# Patient Record
Sex: FEMALE | Race: WHITE | NOT HISPANIC OR LATINO | Employment: OTHER | URBAN - METROPOLITAN AREA
[De-identification: names, ages, dates, MRNs, and addresses within clinical notes are randomized per-mention and may not be internally consistent; named-entity substitution may affect disease eponyms.]

---

## 2017-02-10 ENCOUNTER — APPOINTMENT (OUTPATIENT)
Dept: LAB | Facility: CLINIC | Age: 77
End: 2017-02-10
Payer: COMMERCIAL

## 2017-02-10 ENCOUNTER — ALLSCRIPTS OFFICE VISIT (OUTPATIENT)
Dept: OTHER | Facility: OTHER | Age: 77
End: 2017-02-10

## 2017-02-10 DIAGNOSIS — I10 ESSENTIAL (PRIMARY) HYPERTENSION: ICD-10-CM

## 2017-02-10 DIAGNOSIS — E78.5 HYPERLIPIDEMIA: ICD-10-CM

## 2017-02-10 LAB
ANION GAP SERPL CALCULATED.3IONS-SCNC: 8 MMOL/L (ref 4–13)
BUN SERPL-MCNC: 16 MG/DL (ref 5–25)
CALCIUM SERPL-MCNC: 9.2 MG/DL (ref 8.3–10.1)
CHLORIDE SERPL-SCNC: 105 MMOL/L (ref 100–108)
CHOLEST SERPL-MCNC: 179 MG/DL (ref 50–200)
CO2 SERPL-SCNC: 29 MMOL/L (ref 21–32)
CREAT SERPL-MCNC: 0.82 MG/DL (ref 0.6–1.3)
GFR SERPL CREATININE-BSD FRML MDRD: >60 ML/MIN/1.73SQ M
GLUCOSE SERPL-MCNC: 88 MG/DL (ref 65–140)
HDLC SERPL-MCNC: 87 MG/DL (ref 40–60)
LDLC SERPL DIRECT ASSAY-MCNC: 87 MG/DL (ref 0–100)
NONHDLC SERPL-MCNC: 92 MG/DL
POTASSIUM SERPL-SCNC: 3.7 MMOL/L (ref 3.5–5.3)
SODIUM SERPL-SCNC: 142 MMOL/L (ref 136–145)
TSH SERPL DL<=0.05 MIU/L-ACNC: 2.23 UIU/ML (ref 0.36–3.74)

## 2017-02-10 PROCEDURE — 83721 ASSAY OF BLOOD LIPOPROTEIN: CPT

## 2017-02-10 PROCEDURE — 82465 ASSAY BLD/SERUM CHOLESTEROL: CPT

## 2017-02-10 PROCEDURE — 80048 BASIC METABOLIC PNL TOTAL CA: CPT

## 2017-02-10 PROCEDURE — 83718 ASSAY OF LIPOPROTEIN: CPT

## 2017-02-10 PROCEDURE — 84443 ASSAY THYROID STIM HORMONE: CPT

## 2017-02-10 PROCEDURE — 36415 COLL VENOUS BLD VENIPUNCTURE: CPT

## 2017-05-23 ENCOUNTER — ALLSCRIPTS OFFICE VISIT (OUTPATIENT)
Dept: OTHER | Facility: OTHER | Age: 77
End: 2017-05-23

## 2017-05-23 DIAGNOSIS — R92.2 INCONCLUSIVE MAMMOGRAM: ICD-10-CM

## 2017-05-23 DIAGNOSIS — Z78.0 ASYMPTOMATIC MENOPAUSAL STATE: ICD-10-CM

## 2017-05-23 DIAGNOSIS — Z12.31 ENCOUNTER FOR SCREENING MAMMOGRAM FOR MALIGNANT NEOPLASM OF BREAST: ICD-10-CM

## 2017-07-12 ENCOUNTER — HOSPITAL ENCOUNTER (OUTPATIENT)
Dept: MAMMOGRAPHY | Facility: CLINIC | Age: 77
Discharge: HOME | End: 2017-07-12
Payer: COMMERCIAL

## 2017-07-12 DIAGNOSIS — Z78.0 ASYMPTOMATIC MENOPAUSAL STATE: ICD-10-CM

## 2017-07-12 DIAGNOSIS — Z12.31 ENCOUNTER FOR SCREENING MAMMOGRAM FOR MALIGNANT NEOPLASM OF BREAST: ICD-10-CM

## 2017-07-12 DIAGNOSIS — R92.2 INCONCLUSIVE MAMMOGRAM: ICD-10-CM

## 2017-07-12 PROCEDURE — 77080 DXA BONE DENSITY AXIAL: CPT

## 2017-07-12 PROCEDURE — 77063 BREAST TOMOSYNTHESIS BI: CPT

## 2017-07-12 PROCEDURE — G0202 SCR MAMMO BI INCL CAD: HCPCS

## 2017-07-14 ENCOUNTER — GENERIC CONVERSION - ENCOUNTER (OUTPATIENT)
Dept: OTHER | Facility: OTHER | Age: 77
End: 2017-07-14

## 2017-10-04 ENCOUNTER — ALLSCRIPTS OFFICE VISIT (OUTPATIENT)
Dept: OTHER | Facility: OTHER | Age: 77
End: 2017-10-04

## 2017-10-04 DIAGNOSIS — M54.6 PAIN IN THORACIC SPINE: ICD-10-CM

## 2017-10-05 NOTE — PROGRESS NOTES
Plan  Acute left-sided thoracic back pain    · Omeprazole 20 MG Oral Capsule Delayed Release; TAKE 1 CAPSULE DAILY  EVERY MORNING BEFORE BREAKFAST   · *1 -  PHYSICAL Formerly [facility]  *  Status: Active  Requested  for: 47BUQ6718  Care Summary provided  : Yes    Discussion/Summary    Thoracic back pain without any danger signs to suggest significant underlying disease  Aleve twice a day or Advil 3 times a day together with omeprazole 20 mg once a dayl to reduce the risk of GI bleeding  therapy assessment  in a couple of weeks if this is not getting any better  History of Present Illness  HPI: A 2 week history of thoracic level back pain felt in the left side about the level of T10-11 and 12 on by lifting heavy objects at work  not radiateby trunk twisting and motions  Globally the trend is getting better over the past 2 weeks but it still recurs and is worsened by sudden motions  intermittent nonsteroidal anti-inflammatory drug with some relief radiation at all  associated dysuria, discolored urine, abdominal pain, nausea, vomiting, or diarrhea  taking methocarbamol but she had left over from an ER visit for a similar problem about 1 year ago but it caused nausea      Review of Systems    Constitutional: not feeling poorly-and-not feeling tired  Cardiovascular: no chest pain  Respiratory: no cough  Gastrointestinal: no abdominal pain  Genitourinary: no dysuria  Musculoskeletal: myalgias, but-as noted in HPI  Integumentary: no rashes-and-no skin wound  Neurological: no headache  Active Problems  1  Acute left flank pain (789 09,338 19) (R10 9)   2  Asymptomatic postmenopausal state (V49 81) (Z78 0)   3  Bruit (785 9) (R09 89)   4  Candida vaginitis (112 1) (B37 3)   5  Chronic combined systolic and diastolic heart failure (656 33) (I50 42)   6  Dense breasts (793 82) (R92 2)   7  Dizziness, nonspecific (780 4) (R42)   8   Encounter for gynecological examination with abnormal finding (V72 31) (Z01 411)   9  Encounter for screening mammogram for malignant neoplasm of breast (V76 12)   (Z12 31)   10  Essential hypertension (401 9) (I10)   11  Falls (E888 9) (W19 XXXA)   12  Functional urinary incontinence (788 91) (R39 81)   13  Hyperlipidemia (272 4) (E78 5)   14  Hypertensive left ventricular hypertrophy, without heart failure (402 90) (I11 9)   15  Hypothyroidism (244 9) (E03 9)   16  Joint Pain In Both Knees (719 46)   17  Mild concentric left ventricular hypertrophy (LVH) (429 3) (I51 7)   18  Nonrheumatic aortic valve insufficiency (424 1) (I35 1)   19  Seborrheic keratosis (702 19) (L82 1)   20  Syncope, unspecified syncope type (780 2) (R55)   21  Vaginitis, atrophic (627 3) (N95 2)    Past Medical History  Active Problems And Past Medical History Reviewed: The active problems and past medical history were reviewed and updated today  Surgical History  Surgical History Reviewed: The surgical history was reviewed and updated today  Social History   · Drinks coffee   · Living Independently Alone (V60 3)   · Marital History -    · Never A Smoker   · Never Used Drugs   · Wine Consumption (___ Glasses/Day)  The social history was reviewed and updated today  Family History  Family History Reviewed: The family history was reviewed and updated today  Current Meds   1  Atorvastatin Calcium 20 MG Oral Tablet; take 1 tablet by mouth once daily; Therapy: 60Mrs1330 to (Evaluate:26Nov2017)  Requested for: 33BMW2035; Last   Rx:09Nxq8347 Ordered   2  Flaxseed Oil 1000 MG Oral Capsule; take 1 capsule daily; Therapy: 62ACK8143 to Recorded   3  Levothyroxine Sodium 50 MCG Oral Tablet; take 1 tablet by mouth once daily; Therapy: 92Vdb0176 to (Evaluate:06Apr2018)  Requested for: 20Rtb0548; Last   Rx:32Xvd5080; Status: ACTIVE - Transmit to Pharmacy - Awaiting Verification Ordered   4  Lisinopril 10 MG Oral Tablet; TAKE 1 TABLET DAILY;    Therapy: 28RBB4551 to subcutaneous tissue: Abnormal  -Multiple actinic keratoses  However, no lesions to suggest shingles of the affected area  Neurologic   Sensation: No sensory loss  Future Appointments    Date/Time Provider Specialty Site   10/16/2017 01:00 PM YENI Gutierres  Internal Medicine [facility] [address] AND WOMEN'S [facility]   11/20/2017 02:45 PM YENI Celaya   Dermatology [facility] OF [facility]     Signatures   Electronically signed by : YENI Davis ; Oct  4 2017  3:18PM EST                       (Author)

## 2017-10-16 ENCOUNTER — ALLSCRIPTS OFFICE VISIT (OUTPATIENT)
Dept: OTHER | Facility: OTHER | Age: 77
End: 2017-10-16

## 2017-11-20 ENCOUNTER — ALLSCRIPTS OFFICE VISIT (OUTPATIENT)
Dept: OTHER | Facility: OTHER | Age: 77
End: 2017-11-20

## 2017-11-21 NOTE — PROGRESS NOTES
Assessment  1  Seborrheic keratosis (702 19) (L82 1)   2  Screening for skin condition (V82 0) (Z13 89)   3  History of nonmelanoma skin cancer (V10 83) (Z85 828)    Plan     · Follow-up visit in 1 year Evaluation and Treatment  Follow-up  Status: Hold For -Scheduling  Requested for: 89LRH0640   · Use a sun block product with an SPF of 15 or more ; Status:Complete;   Done:20Nov2017    Discussion/Summary  Discussion Summary- [facility] Derm:  Assessment #1: seborrheic keratosis  Care Plan:  patient reassured these are normal growths acquire with age no treatment needed  Assessment #2: history of skin cancer  Care Plan:  no recurrence nothing else atypical noted sunblock recommended followup in one year  Assessment #3: screening for dermatologic disorders  Care Plan:  nothing else noted on complete exam followup in one year  Chief Complaint  Chief Complaint Free Text Note Form: yearly check up      History of Present Illness  HPI: 25-year-old female presents for overall checkup previous history of skin cancer no specific complaints noted      Review of Systems  Complete Female Dermatology On license of [facility] Patient:  Constitutional: Denies constitutional symptoms  Eyes: Denies eye symptoms  ENT:  denies ear symptoms, nasal symptoms, mouth or throat symptoms  Cardiovascular: Denies cardiovascular symptoms  Respiratory: Denies respiratory symptoms  Gastrointestinal: Denies gastrointestinal symptoms  Musculoskeletal: Denies musculoskeletal symptoms  Integumentary: Denies skin, hair and nail symptoms  Neurological: Denies neurologic symptoms  Psychiatric: Denies psychiatric symptoms  Endocrine: Denies endocrine symptoms  Hematologic/Lymphatic: Denies hematologic symptoms  Active Problems    1  Active advance directive (V49 89) (Z78 9)   2  Acute left flank pain (789 09,338 19) (R10 9)   3  Acute left-sided thoracic back pain (724 1) (M54 6)   4  Asymptomatic postmenopausal state (V49 81) (Z78 0)   5  Bruit (785 9) (R09 89)   6  Candida vaginitis (112 1) (B37 3)   7  Chronic combined systolic and diastolic heart failure (584 43) (I50 42)   8  Dense breasts (793 82) (R92 2)   9  Dizziness, nonspecific (780 4) (R42)   10  Encounter for gynecological examination with abnormal finding (V72 31) (Z01 411)   11  Encounter for screening mammogram for malignant neoplasm of breast (V76 12)  (Z12 31)   12  Essential hypertension (401 9) (I10)   13  Falls (E888 9) (W19 XXXA)   14  Flu vaccine need (V04 81) (Z23)   15  Functional urinary incontinence (788 91) (R39 81)   16  Hyperlipidemia (272 4) (E78 5)   17  Hypertensive left ventricular hypertrophy, without heart failure (402 90) (I11 9)   18  Hypothyroidism (244 9) (E03 9)   19  Joint Pain In Both Knees (719 46)   20  Mild concentric left ventricular hypertrophy (LVH) (429 3) (I51 7)   21  Nonrheumatic aortic valve insufficiency (424 1) (I35 1)   22  Screening for genitourinary condition (V81 6) (Z13 89)   23  Seborrheic keratosis (702 19) (L82 1)   24  Syncope, unspecified syncope type (780 2) (R55)   25  Vaginitis, atrophic (627 3) (N95 2)    Past Medical History  1  History of Basal Cell Carcinoma Of The Eyelid (173 11)   2  History of Colonoscopy (Fiberoptic) Screening   3  History of nonmelanoma skin cancer (V10 83) (Z85 828)   4  History of sciatica (V12 49) (Z86 69)   5  History of uterine fibroid (V13 29) (Z86 018)   6  History of viral warts (V12 09) (Z86 19)   7  History of Inflamed seborrheic keratosis (702 11) (L82 0)  Past Medical History Reviewed- Derm:   The past medical history was reviewed  Surgical History  1  History of Mohs Micrographic Surgery Face   2  History of Thyroid Surgery   3  History of Total Abdominal Hysterectomy  Surgical History Reviewed [facility]- Derm:   Surgical History reviewed      Family History  Mother    1  Family history of Congestive Heart Failure   2  Family history of arthritis (V17 7) (Z82 61)   3   Family history of hypertension (V17 49) (Z82 49)   4  Family history of Mother  At Age 80  Father    11  Family history of Father  At Age 80   5  Family history of Sudden / Instantaneous Death  Sister    7  Family history of kidney disease (V18 69) (Z84 1)  Family History Reviewed- Derm:   Family History was reviewed      Social History     · Active advance directive (V49 89) (Z78 9)   · Drinks coffee   · Living Independently Alone (V60 3)   · Marital History -    · Never A Smoker   · Never smoked tobacco (V49 89) (Z78 9)   · Never Used Drugs   · Wine Consumption (___ Glasses/Day)  Social History Reviewed Marton Halsted- Derm: The social history was reviewed      Current Meds   1  Atorvastatin Calcium 20 MG Oral Tablet; take 1 tablet by mouth once daily; Therapy: 11Rol3589 to (Evaluate:2018)  Requested for: 12YYL9580; Last Rx:2017; Status: ACTIVE - Transmit to [facility] Verification Ordered   2  Flaxseed Oil 1000 MG Oral Capsule; take 1 capsule daily; Therapy: 16ZRI8965 to Recorded   3  Levothyroxine Sodium 50 MCG Oral Tablet; take 1 tablet by mouth once daily; Therapy: 10Ekl3546 to (Evaluate:2018)  Requested for: 08Xew1770; Last Rx:04Vbw4588; Status: ACTIVE - Transmit to Pharmacy - Awaiting Verification Ordered   4  Lisinopril 10 MG Oral Tablet; TAKE 1 TABLET DAILY; Therapy: 56FIM9957 to (Richelle Payne)  Requested for: 14ZBW9607; Last Rx:33Pmv0286 Ordered   5  Metoprolol Succinate ER 25 MG Oral Tablet Extended Release 24 Hour; TAKE ONE TABLET BY MOUTH ONCE A DAY; Therapy: 73QOU4485 to (Evaluate:54Xdd7109)  Requested for: 35Jge7054; Last Rx:24Ulg6094 Ordered   6  Multivitamin Gummies Adult CHEW; Therapy: (CZACDKQT:78JKY0881) to Recorded   7  Omeprazole 20 MG Oral Capsule Delayed Release; TAKE 1 CAPSULE DAILY EVERY MORNING BEFORE BREAKFAST;  Therapy: 04LKQ8322 to (Evaluate:06Dop7036)  Requested for: 67FAV5977; Last Rx:2017; Status: ACTIVE - Transmit to [facility] Verification Ordered   8  Aguirre Fiber Good CHEW; Therapy: (Recorded:04Oct2017) to Recorded   9  Premarin 0 625 MG/GM Vaginal Cream; Insert 0 5 g intravaginally twice per week; Therapy: 51NIR8981 to (Woolford Yuriy)  Requested for: 15JYE0429; Last Rx:23May2017; Status: ACTIVE - Transmit to [facility] Verification Ordered   10  Premarin 0 625 MG/GM Vaginal Cream; insert 1/2 applicatorful vaginally every week at  bedtime; Therapy: 33RLC9974 to (Evaluate:16Nov2014)  Requested for: 76GSK4087; Last  CX:04HDP6320 Ordered   11  Terconazole 0 4 % Vaginal Cream; INSERT 1 APPLICATORFUL INTRAVAGINALLY AT  BEDTIME NIGHTLY; Therapy: 47NTK8038 to (Evaluate:06Jun2017)  Requested for: 83VZT1539; Last  Rx:35Haz6614; Status: ACTIVE - Transmit to [facility] Verification Ordered   12  Viactiv [phone] RFSF;  Therapy: (NGCBGZGW:39MIM1738) to Recorded  Medication List Reviewed: The medication list was reviewed and updated today  Allergies    1  Aspirin TABS    Physical Exam   Constitutional  General appearance: Appears healthy and well developed  Lymphatic  No visible disturbance  Musculoskeletal  Digits and nails: No clubbing, cyanosis or edema  Cutaneous and nail exam normal    Skin  Scalp skin texture and hair distribution: Normal skin texture on scalp, normal hair distribution  Head: Normal turgor, no rashes, no lesions  Neck: Normal turgor, no rashes, no lesions  Chest: Normal turgor, no rashes, no lesions  Abdomen: Normal turgor, no rashes, no lesions  Back: Normal turgor, no rashes, no lesions  Right upper extremity: Normal turgor, no rashes, no lesions  Left upper extremity: Normal turgor, no rashes, no lesions  Right lower extremity: Normal turgor, no rashes, no lesions  Left lower extremity: Normal turgor, no rashes, no lesions  Neuro/Psych  Alert and oriented x 3  Displays comfort and cooperation during encounterl   Affect is normal    Finding previous sites of skin cancer well healed without recurrence keratotic papules greasy stuck appearance nothing else atypical noted        Signatures   Electronically signed by : YENI Lorenzo ; Nov 20 2017  2:55PM EST                       (Author)

## 2017-12-18 ENCOUNTER — GENERIC CONVERSION - ENCOUNTER (OUTPATIENT)
Dept: OTHER | Facility: OTHER | Age: 77
End: 2017-12-18

## 2017-12-18 ENCOUNTER — ALLSCRIPTS OFFICE VISIT (OUTPATIENT)
Dept: OTHER | Facility: OTHER | Age: 77
End: 2017-12-18

## 2017-12-18 DIAGNOSIS — R20.8 OTHER DISTURBANCES OF SKIN SENSATION: ICD-10-CM

## 2017-12-26 ENCOUNTER — GENERIC CONVERSION - ENCOUNTER (OUTPATIENT)
Dept: OTHER | Facility: OTHER | Age: 77
End: 2017-12-26

## 2017-12-26 ENCOUNTER — HOSPITAL ENCOUNTER (OUTPATIENT)
Dept: NON INVASIVE DIAGNOSTICS | Facility: CLINIC | Age: 77
Discharge: HOME | End: 2017-12-26
Payer: COMMERCIAL

## 2017-12-26 DIAGNOSIS — R20.8 OTHER DISTURBANCES OF SKIN SENSATION: ICD-10-CM

## 2017-12-26 DIAGNOSIS — M79.662 PAIN OF LEFT CALF: ICD-10-CM

## 2017-12-26 PROCEDURE — 93971 EXTREMITY STUDY: CPT

## 2017-12-27 ENCOUNTER — GENERIC CONVERSION - ENCOUNTER (OUTPATIENT)
Dept: OTHER | Facility: OTHER | Age: 77
End: 2017-12-27

## 2018-01-12 VITALS
SYSTOLIC BLOOD PRESSURE: 156 MMHG | BODY MASS INDEX: 27.24 KG/M2 | DIASTOLIC BLOOD PRESSURE: 86 MMHG | WEIGHT: 163.5 LBS | HEIGHT: 65 IN | HEART RATE: 64 BPM | OXYGEN SATURATION: 97 %

## 2018-01-13 VITALS
WEIGHT: 161.25 LBS | TEMPERATURE: 97.9 F | BODY MASS INDEX: 27.53 KG/M2 | SYSTOLIC BLOOD PRESSURE: 146 MMHG | HEIGHT: 64 IN | HEART RATE: 83 BPM | DIASTOLIC BLOOD PRESSURE: 84 MMHG | OXYGEN SATURATION: 96 %

## 2018-01-13 VITALS
WEIGHT: 161 LBS | HEIGHT: 64 IN | DIASTOLIC BLOOD PRESSURE: 88 MMHG | SYSTOLIC BLOOD PRESSURE: 130 MMHG | BODY MASS INDEX: 27.49 KG/M2

## 2018-01-14 NOTE — RESULT NOTES
Verified Results  * DXA BONE DENSITY SPINE HIP AND PELVIS [id number] 01:38PM Cleopatra Krishnan Order Number: [id number]    - Patient Instructions: To schedule this appointment, please contact Central Scheduling at [phone]  Test Name Result Flag Reference   DXA BONE DENSITY SPINE HIP AND PELVIS (Report)     CENTRAL DXA SCAN     CLINICAL HISTORY:  64year old post-menopausal  female risk factors include hypothyroidism  Osteoporosis screening  TECHNIQUE: Bone densitometry was performed using a Hologic Horizon C bone densitometer  Regions of interest appear properly placed  There are no obvious fractures or other confounding variables which could limit the study  Degenerative changes of the    lumbar spine and hip  This will falsely elevate the bone mineral densities in these regions    Moderate S-shaped scoliosis  COMPARISON: None  RESULTS:    LUMBAR SPINE: L1-L4:   BMD 1 134 gm/cm2   T-score 0 8   Z-score 3 4     LUMBAR SPINE L2-L4 (average) : BMD 1 111 gm/sq-cm        T-score is 0 3         Z-score is 3 0          LEFT TOTAL HIP:   BMD 0 915 gm/cm2   T-score -0 2   Z-score 1 8     LEFT FEMORAL NECK:   BMD 0 837 gm/cm2   T-score -0 1   Z-score 2 1     LEFT FOREARM :   BMD 0 636 gm/sq-cm,   T-score is -0 8   Z-score is 2 2          IMPRESSION:   1  Based on the [facility] classification, this study is normal and the patient is considered at low risk for fracture  2  A daily intake of calcium of at least 1200 mg and vitamin D, 800-1000 IU, as well as weight bearing and muscle strengthening exercise, fall prevention and avoidance of tobacco and excessive alcohol intake as basic preventive measures are recommended  3  Repeat DXA scan on the same equipment in 18-24 months as clinically indicated  The 10 year risk of hip fracture is 1%, with the 10 year risk of major osteoporotic fracture being 9%, as calculated by the [facility] fracture risk assessment tool (FRAX)   The current NOF guidelines recommend treating patients with FRAX 10 year risk score of    >3% for hip fracture and >20% for major osteoporotic fracture        WHO CLASSIFICATION:   Normal (a T-score of -1 0 or higher)   Low bone mineral density (a T-score of less than -1 0 but higher than -2 5)   Osteoporosis (a T-score of -2 5 or less)   Severe osteoporosis (a T-score of -2 5 or less with a fragility fracture)             Workstation performed: [id number]     Signed by:   Yonathan Ferreira DO   7/13/17

## 2018-01-15 VITALS
RESPIRATION RATE: 14 BRPM | DIASTOLIC BLOOD PRESSURE: 84 MMHG | BODY MASS INDEX: 27.53 KG/M2 | SYSTOLIC BLOOD PRESSURE: 138 MMHG | HEART RATE: 64 BPM | WEIGHT: 160.38 LBS

## 2018-01-16 NOTE — PROGRESS NOTES
Plan  Screening for genitourinary condition    · *VB - Urinary Incontinence Screen (Dx Z13 89 Screen for UI); Status:Complete -  Retrospective By Protocol Authorization;   Done: 72RQP1227 12:57PM    Discussion/Summary    Prevention issues are stable  Treatment issues are stable  Follow-up visit may be yearly or as needed  Impression: Subsequent Annual Wellness Visit, with preventive exam as well as age and risk appropriate counseling completed  Cardiovascular screening and counseling: screening is current and Dx - V81 2 Screen for CV Disorder  Diabetes screening and counseling: screening is current and Dx - V77 1 Screen for DM  Colorectal cancer screening and counseling: screening is current and Dx - V76 51 Screen for CRC  Breast cancer screening and counseling: screening is current  Cervical cancer screening and counseling: screening not indicated  Osteoporosis screening and counseling: counseling was given on obtaining adequate amounts of calcium and vitamin D on a daily basis  Abdominal aortic aneurysm screening and counseling: screening not indicated and Dx - V81 2 Screen for CV Disorder  Glaucoma screening and counseling: screening is current  HIV screening and counseling: screening not indicated  Immunizations: influenza vaccine is up to date this year, the lifetime pneumococcal vaccine has been completed, hepatitis B vaccination series is not indicated at this time due to the patient's low risk of dexter the disease and Tdap vaccination up to date  She was referred to dermatology  Patient Discussion: plan discussed with the patient, follow-up visit needed in one year  History of Present Illness  HPI:   Here for a wellness visit  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% but asymptomatic; followed by Cardiology  On Ace inhibition and beta blockade  Hyperlipidemia and hypothyroidism are treated    Recent back pain but no serious issue regarding this infected is better  Stress incontinence still noted  Welcome to [employer] and Wellness Visits: The patient is being seen for the subsequent annual wellness visit  Medicare Screening and Risk Factors   Hospitalizations: she has been previously hospitalizied and she has been hospitalized 2 times  Once per lifetime medicare screening tests: ECG (normal)  Medicare Screening Tests Risk Questions   Abdominal aortic aneurysm risk assessment: none indicated  Osteoporosis risk assessment: over 48years of age  HIV risk assessment: none indicated  Drug and Alcohol Use: The patient has never smoked cigarettes  The patient reports rare alcohol use  She has never used illicit drugs  Diet and Physical Activity: Current diet includes well balanced meals, limited junk food, 2 servings of fruit per day, 1 servings of vegetables per day, 0-1 servings of meat per day, 1 servings of whole grains per day, 0 servings of simple carbohydrates per day, 2 servings of dairy products per day, 2 cups of coffee per day, 2 cups of tea per day, 0 cans of regular soda per day and 0 cans of diet soda per day  She exercises 3 times per week  Exercise: strength training, balance 2 hours per week  Mood Disorder and Cognitive Impairment Screening:   Depression screening score was 0  Cognitive impairment screening: denies difficulty learning/retaining new information, denies difficulty handling complex tasks, denies difficulty with reasoning, denies difficulty with spatial ability and orientation, denies difficulty with language and denies difficulty with behavior  Advance Directives: Advance directives: living will, durable power of  for health care directives and advance directives  end of life decisions were reviewed with the patient and I agree with the patient's decisions     Co-Managers and Medical Equipment/Suppliers: See Patient Care Team      Patient Care Team    Care Team Member Role Specialty Office Number Olivia Allen MD  Dermatology [phone]     Review of Systems    Over the past 2 weeks, how often have you been bothered by the following problems? 1 ) Little interest or pleasure in doing things? Not at all    2 ) Feeling down, depressed or hopeless? Not at all    3 ) Trouble falling asleep or sleeping too much? Not at all    4 ) Feeling tired or having little energy? Not at all    5 ) Poor appetite or overeating? Not at all    6 ) Feeling bad about yourself, or that you are a failure, or have let yourself or your family down? Not at all    7 ) Trouble concentrating on things, such as reading a newspaper or watching television? Not at all    8 ) Moving or speaking so slowly that other people could have noticed, or the opposite, moving or speaking faster than usual? Not at all    9 ) Thoughts that you would be better off dead or of hurting yourself in some way? Not at all  Score 0      Active Problems    1  Active advance directive (V49 89) (Z78 9)   2  Acute left flank pain (789 09,338 19) (R10 9)   3  Acute left-sided thoracic back pain (724 1) (M54 6)   4  Asymptomatic postmenopausal state (V49 81) (Z78 0)   5  Bruit (785 9) (R09 89)   6  Candida vaginitis (112 1) (B37 3)   7  Chronic combined systolic and diastolic heart failure (412 28) (I50 42)   8  Dense breasts (793 82) (R92 2)   9  Dizziness, nonspecific (780 4) (R42)   10  Encounter for gynecological examination with abnormal finding (V72 31) (Z01 411)   11  Encounter for screening mammogram for malignant neoplasm of breast (V76 12)    (Z12 31)   12  Essential hypertension (401 9) (I10)   13  Falls (E888 9) (W19 XXXA)   14  Flu vaccine need (V04 81) (Z23)   15  Functional urinary incontinence (788 91) (R39 81)   16  Hyperlipidemia (272 4) (E78 5)   17  Hypertensive left ventricular hypertrophy, without heart failure (402 90) (I11 9)   18  Hypothyroidism (244 9) (E03 9)   19  Joint Pain In Both Knees (719 46)   20   Mild concentric left ventricular hypertrophy (LVH) (429 3) (I51 7)   21  Nonrheumatic aortic valve insufficiency (424 1) (I35 1)   22  Screening for genitourinary condition (V81 6) (Z13 89)   23  Seborrheic keratosis (702 19) (L82 1)   24  Syncope, unspecified syncope type (780 2) (R55)   25  Vaginitis, atrophic (627 3) (N95 2)    Past Medical History    · History of Basal Cell Carcinoma Of The Eyelid (173 11)   · History of Colonoscopy (Fiberoptic) Screening   · History of nonmelanoma skin cancer (V10 83) (A80 054)   · History of sciatica (V12 49) (Z86 69)   · History of uterine fibroid (V13 29) (Z86 018)   · History of viral warts (V12 09) (Z86 19)   · History of Inflamed seborrheic keratosis (702 11) (L82 0)    Surgical History    · History of Mohs Micrographic Surgery Face   · History of Thyroid Surgery   · History of Total Abdominal Hysterectomy    The surgical history was reviewed and updated today  Family History  Mother    · Family history of Congestive Heart Failure   · Family history of arthritis (V17 7) (Z82 61)   · Family history of hypertension (V17 49) (Z82 49)   · Family history of Mother  At Age 80  Father    · Family history of Father  At Age 80   · Family history of Sudden / Instantaneous Death  Sister    · Family history of kidney disease (V18 69) (Z84 1)    The family history was reviewed and updated today  Social History    · Active advance directive (V49 89) (Z78 9)   · Drinks coffee   · Living Independently Alone (V60 3)   · Marital History -    · Never A Smoker   · Never smoked tobacco (V49 89) (Z78 9)   · Never Used Drugs   · Wine Consumption (___ Glasses/Day)  The social history was reviewed and updated today  Current Meds   1  Atorvastatin Calcium 20 MG Oral Tablet; take 1 tablet by mouth once daily; Therapy: 96Nme6660 to (Evaluate:2018)  Requested for: 20QFV3582; Last   Rx:2017; Status: ACTIVE - [facility] Verification Ordered   2  Flaxseed Oil 1000 MG Oral Capsule; take 1 capsule daily; Therapy: 12CMC6997 to Recorded   3  Levothyroxine Sodium 50 MCG Oral Tablet; take 1 tablet by mouth once daily; Therapy: 68Ucq7945 to (Evaluate:06Apr2018)  Requested for: 23Vgc4657; Last   Rx:11Apr2017; Status: ACTIVE - Transmit to Pharmacy - Awaiting Verification Ordered   4  Lisinopril 10 MG Oral Tablet; TAKE 1 TABLET DAILY; Therapy: 91PBR6069 to (Juljennifernicholas Frame)  Requested for: 47BJX5195; Last   Rx:06Oct2017 Ordered   5  Metoprolol Succinate ER 25 MG Oral Tablet Extended Release 24 Hour; TAKE ONE   TABLET BY MOUTH ONCE A DAY; Therapy: 48MYT6324 to (Evaluate:56Kal4470)  Requested for: 59Ngm0092; Last   Rx:11Apr2017 Ordered   6  Multivitamin Gummies Adult CHEW;   Therapy: (OHDLYADL:46HCM9136) to Recorded   7  Omeprazole 20 MG Oral Capsule Delayed Release; TAKE 1 CAPSULE DAILY EVERY   MORNING BEFORE BREAKFAST; Therapy: 89FQH4976 to (Evaluate:04Zug3129)  Requested for: 18IBK9414; Last   Rx:04Oct2017; Status: ACTIVE - Transmit to Pharmacy - Awaiting Verification Ordered   8  Aguirre Fiber Good CHEW;   Therapy: (Recorded:04Oct2017) to Recorded   9  Premarin 0 625 MG/GM Vaginal Cream; Insert 0 5 g intravaginally twice per week; Therapy: 40QWL3507 to (Lawayne Jan)  Requested for: 52WMW3498; Last   Rx:45Fjw9997; Status: ACTIVE - Transmit to [facility] Verification Ordered   10  Premarin 0 625 MG/GM Vaginal Cream; insert 1/2 applicatorful vaginally every week at    bedtime; Therapy: 93KHD1669 to (Evaluate:16Nov2014)  Requested for: 59HSL0035; Last    TW:85TYQ3216 Ordered   11  Terconazole 0 4 % Vaginal Cream; INSERT 1 APPLICATORFUL INTRAVAGINALLY AT    BEDTIME NIGHTLY; Therapy: 29FBB0078 to (Evaluate:06Jun2017)  Requested for: 86UJX0140; Last    Rx:74Wxa3673; Status: ACTIVE - Transmit to [facility] Verification Ordered   12  Viactiv [phone] [facility];    Therapy: (Recorded:04Oct2017) to Recorded    Allergies    1   Aspirin TABS    Immunizations   1 2 3 4    Influenza  15-Oct-2013 29-Oct-2015 09-Nov-2016 04-Oct-2017    PCV  09-Nov-2016       Pneumo Other  09-Aug-2012       Tdap  19-Oct-2005       Zoster  28-Jan-2013        Vitals  Signs    Heart Rate: 64  Systolic: 315  Diastolic: 86  Height: 5 ft 4 75 in  Weight: 163 lb 8 oz  BMI Calculated: 27 42  BSA Calculated: 1 81  O2 Saturation: 97    Results/Data  *VB - Urinary Incontinence Screen (Dx Z13 89 Screen for UI) 07AKJ6613 12:57PM Loco Duke     Test Name Result Flag Reference   Urinary Incontinence Assessment [id number]         Health Management  Health Maintenance   Fluzone Intramuscular Injectable; every 1 year; Last 29CSB6524; Next Due: 82EBI1844; Overdue  Medicare Annual Wellness Visit; every 1 year; Last 18Ytl9568; Next Due: 63Vyd0633; Overdue  Pneumo; every 5 years; Last 51Jyj7786; Next Due: 28Ihu8478; Overdue    Future Appointments    Date/Time Provider Specialty Site   11/20/2017 02:45 PM YENI Parekh   Dermatology [facility]     Signatures   Electronically signed by : YENI Herring ; Oct 16 2017  1:29PM EST                       (Author)

## 2018-01-23 VITALS
SYSTOLIC BLOOD PRESSURE: 148 MMHG | OXYGEN SATURATION: 94 % | HEART RATE: 66 BPM | HEIGHT: 65 IN | TEMPERATURE: 97.5 F | BODY MASS INDEX: 27.36 KG/M2 | WEIGHT: 164.25 LBS | DIASTOLIC BLOOD PRESSURE: 78 MMHG

## 2018-01-23 NOTE — RESULT NOTES
Verified Results  VAS LOWER LIMB VENOUS DUPLEX STUDY, UNILATERAL/LIMITED 73AQC2281 12:45PM Jan LAL Order Number: [id number]    - Patient Instructions: To schedule this appointment, please contact Central Scheduling at [phone]  Test Name Result Flag Reference   VAS LOWER LIMB VENOUS DUPLEX STUDY, UNILATERAL/LIMITED (Report)     [facility] REPORT   CLINICAL:   Indications: Limb Pain [M79 609]  Patient c/o a burning sensation in the left medial calf that began 6 days ago  Patient reports that pain has revolved at the time of the exam   The patient has no history of DVT or malignancy  FINDINGS:      Left   Impression       CFV   Normal (Patent)             CONCLUSION:   Impression:   RIGHT LOWER LIMB LIMITED: NORMAL   Evaluation shows no evidence of thrombus in the common femoral vein  Doppler evaluation shows a normal response to augmentation maneuvers  LEFT LOWER LIMB: NORMAL   No evidence of acute or chronic deep vein thrombosis   No evidence of superficial thrombophlebitis noted  Doppler evaluation shows a normal response to augmentation maneuvers  Popliteal and peroneal arterial Doppler waveforms are triphasic/biphasic        SIGNATURE:   Electronically Signed by: Nimesh Hua MD, [id number]  on 2017-12-26 03:08:41 PM

## 2018-02-11 DIAGNOSIS — E03.9 HYPOTHYROIDISM, UNSPECIFIED TYPE: Primary | ICD-10-CM

## 2018-02-12 RX ORDER — LEVOTHYROXINE SODIUM 0.05 MG/1
TABLET ORAL
Qty: 90 TABLET | Refills: 2 | Status: SHIPPED | OUTPATIENT
Start: 2018-02-12 | End: 2018-03-14 | Stop reason: SDUPTHER

## 2018-03-14 DIAGNOSIS — E78.5 HYPERLIPIDEMIA, UNSPECIFIED HYPERLIPIDEMIA TYPE: ICD-10-CM

## 2018-03-14 DIAGNOSIS — E03.9 HYPOTHYROIDISM, UNSPECIFIED TYPE: ICD-10-CM

## 2018-03-14 DIAGNOSIS — I10 HYPERTENSION, UNSPECIFIED TYPE: ICD-10-CM

## 2018-03-14 RX ORDER — ATORVASTATIN CALCIUM 20 MG/1
1 TABLET, FILM COATED ORAL DAILY
COMMUNITY
Start: 2012-04-10 | End: 2018-03-14 | Stop reason: SDUPTHER

## 2018-03-14 RX ORDER — ATORVASTATIN CALCIUM 20 MG/1
20 TABLET, FILM COATED ORAL DAILY
Qty: 90 TABLET | Refills: 3 | Status: SHIPPED | OUTPATIENT
Start: 2018-03-14 | End: 2019-02-27 | Stop reason: SDUPTHER

## 2018-03-14 RX ORDER — LISINOPRIL 10 MG/1
10 TABLET ORAL DAILY
Qty: 90 TABLET | Refills: 3 | Status: SHIPPED | OUTPATIENT
Start: 2018-03-14 | End: 2019-02-27 | Stop reason: SDUPTHER

## 2018-03-14 RX ORDER — LISINOPRIL 10 MG/1
1 TABLET ORAL DAILY
COMMUNITY
Start: 2015-12-08 | End: 2018-03-14 | Stop reason: SDUPTHER

## 2018-03-14 RX ORDER — METOPROLOL SUCCINATE 25 MG/1
25 TABLET, EXTENDED RELEASE ORAL DAILY
Qty: 90 TABLET | Refills: 3 | Status: SHIPPED | OUTPATIENT
Start: 2018-03-14 | End: 2019-02-27 | Stop reason: SDUPTHER

## 2018-03-14 RX ORDER — METOPROLOL SUCCINATE 25 MG/1
1 TABLET, EXTENDED RELEASE ORAL DAILY
COMMUNITY
Start: 2016-01-05 | End: 2018-03-14 | Stop reason: SDUPTHER

## 2018-03-14 RX ORDER — LEVOTHYROXINE SODIUM 0.05 MG/1
50 TABLET ORAL DAILY
Qty: 90 TABLET | Refills: 3 | Status: SHIPPED | OUTPATIENT
Start: 2018-03-14 | End: 2019-02-27 | Stop reason: SDUPTHER

## 2018-03-14 NOTE — TELEPHONE ENCOUNTER
Levothyroxine Sodium 50 mcg  QD  Metoprolol Succinate 25 mg QD  Lisinopril 10 mg QD  Atorvastatin Calcium 20 mg QD    90 day supply on all

## 2018-05-17 ENCOUNTER — OFFICE VISIT (OUTPATIENT)
Dept: INTERNAL MEDICINE CLINIC | Facility: CLINIC | Age: 78
End: 2018-05-17
Payer: MEDICARE

## 2018-05-17 VITALS
WEIGHT: 162.6 LBS | DIASTOLIC BLOOD PRESSURE: 92 MMHG | HEART RATE: 91 BPM | OXYGEN SATURATION: 94 % | TEMPERATURE: 98.3 F | HEIGHT: 64 IN | SYSTOLIC BLOOD PRESSURE: 170 MMHG | BODY MASS INDEX: 27.76 KG/M2

## 2018-05-17 DIAGNOSIS — M54.9 OTHER ACUTE BACK PAIN: Primary | ICD-10-CM

## 2018-05-17 PROCEDURE — 99214 OFFICE O/P EST MOD 30 MIN: CPT | Performed by: PHYSICIAN ASSISTANT

## 2018-05-17 RX ORDER — ECHINACEA 400 MG
1 CAPSULE ORAL DAILY
COMMUNITY
Start: 2014-03-19

## 2018-05-17 RX ORDER — ASPIRIN 325 MG
TABLET ORAL
COMMUNITY

## 2018-05-17 RX ORDER — CYCLOBENZAPRINE HCL 10 MG
10 TABLET ORAL 3 TIMES DAILY PRN
Qty: 30 TABLET | Refills: 0 | Status: SHIPPED | OUTPATIENT
Start: 2018-05-17 | End: 2018-11-07 | Stop reason: ALTCHOICE

## 2018-05-17 NOTE — PROGRESS NOTES
Assessment/Plan:    Mid, left back pain-  Likely strain  Use Ibuprofen 600mg TID for 5 days for WITH FOOD  Add muscle relaxer, sedation cautioned  Heat to area 2-3 times a day for 5 days  No problem-specific Assessment & Plan notes found for this encounter  Diagnoses and all orders for this visit:    Other acute back pain  -     cyclobenzaprine (FLEXERIL) 10 mg tablet; Take 1 tablet (10 mg total) by mouth 3 (three) times a day as needed for muscle spasms    Other orders  -     Calcium-Vitamin D-Vitamin K 500-100-40 MG-UNT-MCG CHEW; Chew  -     terconazole (TERAZOL 7) 0 4 % vaginal cream; Insert 1 Applicatorful into the vagina  -     conjugated estrogens (PREMARIN) vaginal cream; Insert into the vagina  -     Multiple Vitamins-Minerals (MULTIVITAMIN GUMMIES ADULT) CHEW; Chew  -     Inulin 2 g CHEW; Chew  -     Flaxseed, Linseed, (FLAXSEED OIL) 1000 MG CAPS; Take 1 capsule by mouth daily          Subjective:      Patient ID: Mahesh Ragsdale is a 76 y o  female  Pt has severe back pain since Monday  She works at [employer] and was doing heavy lifting of boxes and buckets  Pain is in the mid left back  No radiation or numbness or tingling  She had such bad pain she was nauseous and vomited yesterday  She's been taking 1 Advil PM at night and Tylenol 1000mg during the day  Also using a massager  Not much relief  The following portions of the patient's history were reviewed and updated as appropriate: allergies, current medications, past family history, past medical history, past social history, past surgical history and problem list     Review of Systems   Constitutional: Negative for chills and fever  Cardiovascular: Negative for chest pain and palpitations  Gastrointestinal: Negative for abdominal pain, diarrhea and nausea  Musculoskeletal: Positive for back pain and myalgias           Objective:      /92 (BP Location: Left arm, Patient Position: Sitting)   Pulse 91   Temp 98 3 °F (36 8 °C)   Ht 5' 4" (1 626 m)   Wt 73 8 kg (162 lb 9 6 oz)   SpO2 94%   BMI 27 91 kg/m²          Physical Exam   Constitutional: She appears well-developed and well-nourished  HENT:   Head: Normocephalic and atraumatic  Cardiovascular: Normal rate and normal heart sounds  Pulmonary/Chest: Effort normal and breath sounds normal    Abdominal: Soft  Bowel sounds are normal    Musculoskeletal: Normal range of motion  She exhibits no edema or tenderness  Skin: No rash noted

## 2018-06-27 ENCOUNTER — OFFICE VISIT (OUTPATIENT)
Dept: INTERNAL MEDICINE CLINIC | Facility: CLINIC | Age: 78
End: 2018-06-27
Payer: MEDICARE

## 2018-06-27 ENCOUNTER — APPOINTMENT (OUTPATIENT)
Dept: LAB | Facility: CLINIC | Age: 78
End: 2018-06-27
Payer: MEDICARE

## 2018-06-27 VITALS
HEIGHT: 64 IN | OXYGEN SATURATION: 95 % | WEIGHT: 165 LBS | TEMPERATURE: 97.1 F | DIASTOLIC BLOOD PRESSURE: 76 MMHG | SYSTOLIC BLOOD PRESSURE: 128 MMHG | BODY MASS INDEX: 28.17 KG/M2 | HEART RATE: 73 BPM

## 2018-06-27 DIAGNOSIS — R82.998 DARK URINE: ICD-10-CM

## 2018-06-27 DIAGNOSIS — N39.492 POSTURAL URINARY INCONTINENCE: ICD-10-CM

## 2018-06-27 DIAGNOSIS — N39.492 POSTURAL URINARY INCONTINENCE: Primary | ICD-10-CM

## 2018-06-27 LAB
BACTERIA UR QL AUTO: ABNORMAL /HPF
BILIRUB UR QL STRIP: NEGATIVE
CLARITY UR: CLEAR
COLOR UR: YELLOW
GLUCOSE UR STRIP-MCNC: NEGATIVE MG/DL
HGB UR QL STRIP.AUTO: NEGATIVE
HYALINE CASTS #/AREA URNS LPF: ABNORMAL /LPF
KETONES UR STRIP-MCNC: NEGATIVE MG/DL
LEUKOCYTE ESTERASE UR QL STRIP: ABNORMAL
NITRITE UR QL STRIP: NEGATIVE
NON-SQ EPI CELLS URNS QL MICRO: ABNORMAL /HPF
PH UR STRIP.AUTO: 6.5 [PH] (ref 4.5–8)
PROT UR STRIP-MCNC: NEGATIVE MG/DL
RBC #/AREA URNS AUTO: ABNORMAL /HPF
SP GR UR STRIP.AUTO: 1.01 (ref 1–1.03)
UROBILINOGEN UR QL STRIP.AUTO: 0.2 E.U./DL
WBC #/AREA URNS AUTO: ABNORMAL /HPF

## 2018-06-27 PROCEDURE — 99214 OFFICE O/P EST MOD 30 MIN: CPT | Performed by: PHYSICIAN ASSISTANT

## 2018-06-27 PROCEDURE — 87086 URINE CULTURE/COLONY COUNT: CPT

## 2018-06-27 PROCEDURE — 81001 URINALYSIS AUTO W/SCOPE: CPT | Performed by: PHYSICIAN ASSISTANT

## 2018-06-27 NOTE — PROGRESS NOTES
Assessment/Plan:      Enuresis and severe urinary leakage- we will check her urine and culture with sensitivity to rule out UTI  We will refer her to Urogynecology  She would like a local physician who is Dr Addy Rondon    No problem-specific Kit Aponte notes found for this encounter  Diagnoses and all orders for this visit:    Postural urinary incontinence  -     Urinalysis with microscopic  -     Urine culture; Future  -     Ambulatory referral to Urology; Future    Dark urine  -     Urinalysis with microscopic  -     Urine culture; Future  -     Ambulatory referral to Urology; Future          Subjective:      Patient ID: Lenore Alejandra is a 76 y o  female  Patient comes in with complaints of urinary leakage  Patient has had this problem for many years  She says it has been mild and she has only had to wear small mini pads  She said lately has been gotten much worse to the point where it is severe  She states she was on a bus trip and when she stood up she had so much urinary leakage that she swelled herself  She does also admit that her urine has a very dark color and strong odor but she does not have any dysuria or hematuria  She works at [employer] from [phone] in the morning and she says she rarely takes bathroom breaks  She does admit that holding her urine is part of the problem  She rarely drinks much water because of this problem  No abdominal pain, nausea, vomiting or back pain  No fever, chills or sweats  she does say that years ago her gynecologist said that someday she would probably need a bladder suspension  The following portions of the patient's history were reviewed and updated as appropriate: allergies, current medications, past family history, past medical history, past social history, past surgical history and problem list     Review of Systems   Constitutional: Negative for chills, fatigue and fever     Respiratory: Negative for chest tightness and shortness of breath  Cardiovascular: Negative for chest pain and palpitations  Gastrointestinal: Negative for abdominal pain, diarrhea, nausea and vomiting  Genitourinary: Positive for enuresis, frequency and urgency  Negative for difficulty urinating, dysuria and hematuria  Objective:      /76 (BP Location: Left arm, Patient Position: Sitting)   Pulse 73   Temp (!) 97 1 °F (36 2 °C)   Ht 5' 4" (1 626 m)   Wt 74 8 kg (165 lb)   SpO2 95%   BMI 28 32 kg/m²          Physical Exam   Constitutional: She appears well-developed and well-nourished  Cardiovascular: Normal rate and regular rhythm  Pulmonary/Chest: Effort normal and breath sounds normal    Abdominal: Soft  Bowel sounds are normal  There is no tenderness

## 2018-06-29 LAB — BACTERIA UR CULT: NORMAL

## 2018-11-07 ENCOUNTER — OFFICE VISIT (OUTPATIENT)
Dept: INTERNAL MEDICINE CLINIC | Facility: CLINIC | Age: 78
End: 2018-11-07
Payer: MEDICARE

## 2018-11-07 VITALS
HEART RATE: 76 BPM | DIASTOLIC BLOOD PRESSURE: 76 MMHG | BODY MASS INDEX: 28.65 KG/M2 | OXYGEN SATURATION: 97 % | WEIGHT: 167.8 LBS | SYSTOLIC BLOOD PRESSURE: 142 MMHG | HEIGHT: 64 IN

## 2018-11-07 DIAGNOSIS — N39.3 STRESS INCONTINENCE OF URINE: ICD-10-CM

## 2018-11-07 DIAGNOSIS — E03.9 ACQUIRED HYPOTHYROIDISM: ICD-10-CM

## 2018-11-07 DIAGNOSIS — Z23 ENCOUNTER FOR IMMUNIZATION: Primary | ICD-10-CM

## 2018-11-07 DIAGNOSIS — I50.42 CHRONIC COMBINED SYSTOLIC AND DIASTOLIC HEART FAILURE (HCC): Primary | ICD-10-CM

## 2018-11-07 DIAGNOSIS — Z12.39 BREAST SCREENING: ICD-10-CM

## 2018-11-07 DIAGNOSIS — R07.89 CHEST WALL PAIN: ICD-10-CM

## 2018-11-07 DIAGNOSIS — Z12.11 COLON CANCER SCREENING: ICD-10-CM

## 2018-11-07 PROCEDURE — 99214 OFFICE O/P EST MOD 30 MIN: CPT | Performed by: INTERNAL MEDICINE

## 2018-11-07 PROCEDURE — G0008 ADMIN INFLUENZA VIRUS VAC: HCPCS

## 2018-11-07 PROCEDURE — G0439 PPPS, SUBSEQ VISIT: HCPCS | Performed by: INTERNAL MEDICINE

## 2018-11-07 PROCEDURE — 90662 IIV NO PRSV INCREASED AG IM: CPT

## 2018-11-07 RX ORDER — TROSPIUM CHLORIDE ER 60 MG/1
60 CAPSULE ORAL
COMMUNITY
End: 2020-12-23

## 2018-11-07 NOTE — PROGRESS NOTES
Assessment/Plan:       Diagnoses and all orders for this visit:    Chronic combined systolic and diastolic heart failure (HCC)  -     CBC and differential; Future  -     Lipid Panel with Direct LDL reflex; Future  -     Comprehensive metabolic panel; Future  -     TSH, 3rd generation with Free T4 reflex; Future  -     Urinalysis with reflex to microscopic  -     NT-BNP PRO; Future  -     Echo complete with contrast if indicated; Future    Acquired hypothyroidism  -     CBC and differential; Future  -     Lipid Panel with Direct LDL reflex; Future  -     Comprehensive metabolic panel; Future  -     TSH, 3rd generation with Free T4 reflex; Future    Stress incontinence of urine    Chest wall pain  -     XR chest pa & lateral; Future  -     XR ribs 2 vw left; Future    Breast screening    Colon cancer screening  -     Occult Bloood,Fecal Immunochemical; Future    Other orders  -     trospium (SANCTURA XR) 60 mg 24 hr capsule; Take 60 mg by mouth daily before breakfast  -     Cranberry 1000 MG CAPS; Take by mouth          Patient Instructions   Chest wall pain-obtain x-ray of affected area  History of combined systolic and diastolic dysfunction without heart failure-recheck echocardiogram  Hypothyroidism-recheck thyroid profile  Needs screening mammogram  Need FI T for a cancer screen  Routine laboratory testing  Yearly follow          Subjective:      Patient ID: Jairo Paris is a [de-identified] y o  female  An independent [de-identified]year-old patient comes in for yearly examination  She has immediate complaint of pain felt in the left lower chest wall at the junction of the thorax and the abdomen in the left chemical of acute a line noted for about 1 month exacerbated by range of motion but also exacerbated by direct pressure on the affected area  No dyspnea, no cough, no wheeze, no diaphoresis    This is not exertional symptoms  Some mild arthralgias otherwise    Chronic problem of colon  Mixed systolic and diastolic cardiac dysfunction with EF of 40% but currently asymptomatic  Followed by cardiology and placed on low-dose lisinopril plus beta blockade  is treated  Hypothyroidism is supplemented  Lightheadedness- rare intermittent episodes of lightheadedness occurring for at least -15 years  These can occur at rest and walking about  They're very brief lasting anywhere from 30 seconds to 2 minutes  obvious precipitating or relieving factor  Tinnitus-patient has long-standing tinnitus which is chronic and ongoing  severe hearing loss is reported    Urinary stress incontinence-seen by Urogynecology        The following portions of the patient's history were reviewed and updated as appropriate:   She has a past medical history of Basal cell carcinoma; H/O colonoscopy; History of nonmelanoma skin cancer; Inflamed seborrheic keratosis; Sciatica; Uterine fibroid; and Viral warts  ,   does not have any pertinent problems on file  ,   has a past surgical history that includes Mohs surgery (11/08/2012); Thyroid surgery; and Total abdominal hysterectomy  ,  family history includes Arthritis in her mother; Heart failure in her mother; Hypertension in her mother; Kidney disease in her sister  ,   reports that she has never smoked  She has never used smokeless tobacco  She reports that she drinks alcohol  She reports that she does not use drugs  ,  is allergic to aspirin     Current Outpatient Prescriptions   Medication Sig Dispense Refill    atorvastatin (LIPITOR) 20 mg tablet Take 1 tablet (20 mg total) by mouth daily 90 tablet 3    Calcium-Vitamin D-Vitamin K 500-100-40 MG-UNT-MCG CHEW Chew      conjugated estrogens (PREMARIN) vaginal cream Insert into the vagina      Cranberry 1000 MG CAPS Take by mouth      Flaxseed, Linseed, (FLAXSEED OIL) 1000 MG CAPS Take 1 capsule by mouth daily      levothyroxine 50 mcg tablet Take 1 tablet (50 mcg total) by mouth daily 90 tablet 3    lisinopril (ZESTRIL) 10 mg tablet Take 1 tablet (10 mg total) by mouth daily 90 tablet 3    metoprolol succinate (TOPROL-XL) 25 mg 24 hr tablet Take 1 tablet (25 mg total) by mouth daily 90 tablet 3    Multiple Vitamins-Minerals (MULTIVITAMIN GUMMIES ADULT) CHEW Chew      terconazole (TERAZOL 7) 0 4 % vaginal cream Insert 1 Applicatorful into the vagina      trospium (SANCTURA XR) 60 mg 24 hr capsule Take 60 mg by mouth daily before breakfast       No current facility-administered medications for this visit  Review of Systems   Constitutional: Negative for chills and fever  HENT: Positive for tinnitus  Negative for sore throat and trouble swallowing  Eyes: Negative for pain  Respiratory: Negative for cough, shortness of breath and wheezing  Cardiovascular: Positive for chest pain  Negative for leg swelling  A localized pain felt in the left lower chest at the margin of the ribcage and the abdominal cavity exacerbated by pressing directly on the area  There is not an exertional symptoms  Gastrointestinal: Negative for abdominal pain, diarrhea, nausea and vomiting  Endocrine: Negative for cold intolerance and heat intolerance  Genitourinary: Positive for difficulty urinating and frequency  Negative for dysuria and pelvic pain  Musculoskeletal: Positive for arthralgias  Negative for joint swelling  Skin: Negative for rash and wound  Allergic/Immunologic: Negative for immunocompromised state  Neurological: Positive for dizziness  Negative for seizures, syncope and headaches  Psychiatric/Behavioral: Negative for dysphoric mood  The patient is not nervous/anxious  Objective:  Vitals:    11/07/18 1304   BP: 142/76   Pulse: 76   SpO2: 97%      Physical Exam   Constitutional: She is oriented to person, place, and time  She appears well-developed and well-nourished  No distress  An alert patient who appears stated age   HENT:   Head: Normocephalic and atraumatic  Eyes: Pupils are equal, round, and reactive to light   EOM are normal  Neck: Normal range of motion  Neck supple  No tracheal deviation present  No thyromegaly present  Cardiovascular: Normal rate, regular rhythm, S1 normal, S2 normal and normal heart sounds  Exam reveals no gallop  No murmur heard  Pulses:       Dorsalis pedis pulses are 2+ on the right side, and 2+ on the left side  Posterior tibial pulses are 1+ on the right side, and 1+ on the left side  Superficial varicosities of the right lower leg   Pulmonary/Chest: No respiratory distress  She has no wheezes  She has no rales  Abdominal: Soft  Bowel sounds are normal  There is no tenderness  Musculoskeletal: Normal range of motion  She exhibits tenderness  She exhibits no deformity  Tenderness to palpation of the left lower thoracic wall at the junction of the thorax and the abdomen in the hemoclip the [place] line  No visible skin lesion overlying  No palpable mass  Neurological: She is alert and oriented to person, place, and time  Coordination normal    Skin: Skin is warm  Multiple actinic keratoses of various sizes   Psychiatric: She has a normal mood and affect   Judgment normal

## 2018-11-07 NOTE — PROGRESS NOTES
Assessment and Plan:    Problem List Items Addressed This Visit     None        Health Maintenance Due   Topic Date Due    Medicare Annual Wellness Visit (AWV)  1940    SLP PLAN OF CARE  1940    DTaP,Tdap,and Td Vaccines (2 - Td) 10/19/2015    INFLUENZA VACCINE  07/01/2018         HPI:  Natalia Casas is a [de-identified] y o  female here for her Subsequent Wellness Visit  There is no problem list on file for this patient      Past Medical History:   Diagnosis Date    Basal cell carcinoma     H/O colonoscopy     History of nonmelanoma skin cancer     last assessed 11/20/17    Inflamed seborrheic keratosis     Sciatica     Uterine fibroid     Viral warts      Past Surgical History:   Procedure Laterality Date    MOHS SURGERY  11/08/2012    BCC L [place]    THYROID SURGERY      TOTAL ABDOMINAL HYSTERECTOMY       Family History   Problem Relation Age of Onset    Heart failure Mother     Arthritis Mother     Hypertension Mother     Kidney disease Sister      History   Smoking Status    Never Smoker   Smokeless Tobacco    Never Used     History   Alcohol Use    Yes     Comment: Wine      History   Drug Use No       Current Outpatient Prescriptions   Medication Sig Dispense Refill    atorvastatin (LIPITOR) 20 mg tablet Take 1 tablet (20 mg total) by mouth daily 90 tablet 3    Calcium-Vitamin D-Vitamin K 500-100-40 MG-UNT-MCG CHEW Chew      conjugated estrogens (PREMARIN) vaginal cream Insert into the vagina      Cranberry 1000 MG CAPS Take by mouth      Flaxseed, Linseed, (FLAXSEED OIL) 1000 MG CAPS Take 1 capsule by mouth daily      levothyroxine 50 mcg tablet Take 1 tablet (50 mcg total) by mouth daily 90 tablet 3    lisinopril (ZESTRIL) 10 mg tablet Take 1 tablet (10 mg total) by mouth daily 90 tablet 3    metoprolol succinate (TOPROL-XL) 25 mg 24 hr tablet Take 1 tablet (25 mg total) by mouth daily 90 tablet 3    Multiple Vitamins-Minerals (MULTIVITAMIN GUMMIES ADULT) CHEW Chew  terconazole (TERAZOL 7) 0 4 % vaginal cream Insert 1 Applicatorful into the vagina      trospium (SANCTURA XR) 60 mg 24 hr capsule Take 60 mg by mouth daily before breakfast       No current facility-administered medications for this visit  Allergies   Allergen Reactions    Aspirin      Immunization History   Administered Date(s) Administered    Influenza Split High Dose Preservative Free IM 10/29/2015, 11/09/2016, 10/04/2017    Influenza TIV (IM) 10/15/2013    Pneumococcal Conjugate 13-Valent 11/09/2016    Pneumococcal Polysaccharide PPV23 08/09/2012    Tdap 10/19/2005    Zoster 01/28/2013       Patient Care Team:  Juan Miller MD as PCP - General  Jessica Murphy MD    Medicare Screening Tests and Risk Assessments:  Zach Moreno is here for her Subsequent Wellness visit  Last Medicare Wellness visit information reviewed, patient interviewed, no change since last AWV  Health Risk Assessment:  Patient rates overall health as good  Patient feels that their physical health rating is Same  Eyesight was rated as Slightly worse  Hearing was rated as Same  Patient feels that their emotional and mental health rating is Same  Pain experienced by patient in the last 7 days has been Some  Patient's pain rating has been 4/10  Emotional/Mental Health:  Patient has been feeling nervous/anxious  PHQ-9 Depression Screening:    Frequency of the following problems over the past two weeks:      1  Little interest or pleasure in doing things: 0 - not at all      2  Feeling down, depressed, or hopeless: 0 - not at all  PHQ-2 Score: 0          Broken Bones/Falls: Fall Risk Assessment:    In the past year, patient has experienced: No history of falling in past year          Bladder/Bowel:  Patient has leaked urine accidently in the last six months  Patient reports no loss of bowel control  Immunizations:  Patient has not had a flu vaccination within the last year        Home Safety:  Patient has trouble with stairs inside or outside of their home  Patient currently reports that there are no safety hazards present in home, working smoke alarms, working carbon monoxide detectors  Preventative Screenings:   No breast cancer screening performed, no colon cancer screen completed, no cholesterol screen completed, no glaucoma eye exam completed    Nutrition:  Current diet: Regular with servings of the following:    Medications:  Patient is currently taking over-the-counter supplements  List of OTC medications includes: see list   Patient is able to manage medications  Lifestyle Choices:  Patient reports no tobacco use  Patient has not smoked or used tobacco in the past   Patient reports alcohol use  Alcohol use per week: 1 - 2 wkly  Patient drives a vehicle  Patient wears seat belt  Activities of Daily Living:  Can get out of bed by his or her self, able to dress self, able to make own meals, able to do own shopping, able to bathe self, can do own laundry/housekeeping, can manage own money, pay bills and track expenses    Previous Hospitalizations:  No hospitalization or ED visit in past 12 months        Advanced Directives:  Patient has decided on a power of   Patient has spoken to designated power of   Patient has completed advanced directive

## 2018-11-09 ENCOUNTER — APPOINTMENT (OUTPATIENT)
Dept: LAB | Facility: CLINIC | Age: 78
End: 2018-11-09
Payer: MEDICARE

## 2018-11-09 ENCOUNTER — TELEPHONE (OUTPATIENT)
Dept: INTERNAL MEDICINE CLINIC | Facility: CLINIC | Age: 78
End: 2018-11-09

## 2018-11-09 DIAGNOSIS — E03.9 ACQUIRED HYPOTHYROIDISM: ICD-10-CM

## 2018-11-09 DIAGNOSIS — N39.0 URINARY TRACT INFECTION WITHOUT HEMATURIA, SITE UNSPECIFIED: ICD-10-CM

## 2018-11-09 DIAGNOSIS — N39.0 URINARY TRACT INFECTION WITHOUT HEMATURIA, SITE UNSPECIFIED: Primary | ICD-10-CM

## 2018-11-09 DIAGNOSIS — I50.42 CHRONIC COMBINED SYSTOLIC AND DIASTOLIC HEART FAILURE (HCC): ICD-10-CM

## 2018-11-09 LAB
ALBUMIN SERPL BCP-MCNC: 3.5 G/DL (ref 3.5–5)
ALP SERPL-CCNC: 90 U/L (ref 46–116)
ALT SERPL W P-5'-P-CCNC: 24 U/L (ref 12–78)
ANION GAP SERPL CALCULATED.3IONS-SCNC: 4 MMOL/L (ref 4–13)
AST SERPL W P-5'-P-CCNC: 20 U/L (ref 5–45)
BACTERIA UR QL AUTO: ABNORMAL /HPF
BASOPHILS # BLD AUTO: 0.04 THOUSANDS/ΜL (ref 0–0.1)
BASOPHILS NFR BLD AUTO: 1 % (ref 0–1)
BILIRUB SERPL-MCNC: 0.9 MG/DL (ref 0.2–1)
BILIRUB UR QL STRIP: NEGATIVE
BUN SERPL-MCNC: 13 MG/DL (ref 5–25)
CALCIUM SERPL-MCNC: 9.1 MG/DL (ref 8.3–10.1)
CHLORIDE SERPL-SCNC: 102 MMOL/L (ref 100–108)
CHOLEST SERPL-MCNC: 168 MG/DL (ref 50–200)
CLARITY UR: ABNORMAL
CO2 SERPL-SCNC: 30 MMOL/L (ref 21–32)
COLOR UR: YELLOW
CREAT SERPL-MCNC: 0.81 MG/DL (ref 0.6–1.3)
EOSINOPHIL # BLD AUTO: 0.13 THOUSAND/ΜL (ref 0–0.61)
EOSINOPHIL NFR BLD AUTO: 2 % (ref 0–6)
ERYTHROCYTE [DISTWIDTH] IN BLOOD BY AUTOMATED COUNT: 14.6 % (ref 11.6–15.1)
GFR SERPL CREATININE-BSD FRML MDRD: 69 ML/MIN/1.73SQ M
GLUCOSE P FAST SERPL-MCNC: 82 MG/DL (ref 65–99)
GLUCOSE UR STRIP-MCNC: NEGATIVE MG/DL
HCT VFR BLD AUTO: 41.7 % (ref 34.8–46.1)
HDLC SERPL-MCNC: 66 MG/DL (ref 40–60)
HGB BLD-MCNC: 13.4 G/DL (ref 11.5–15.4)
HGB UR QL STRIP.AUTO: ABNORMAL
HYALINE CASTS #/AREA URNS LPF: ABNORMAL /LPF
IMM GRANULOCYTES # BLD AUTO: 0.01 THOUSAND/UL (ref 0–0.2)
IMM GRANULOCYTES NFR BLD AUTO: 0 % (ref 0–2)
KETONES UR STRIP-MCNC: NEGATIVE MG/DL
LDLC SERPL CALC-MCNC: 83 MG/DL (ref 0–100)
LEUKOCYTE ESTERASE UR QL STRIP: ABNORMAL
LYMPHOCYTES # BLD AUTO: 1.6 THOUSANDS/ΜL (ref 0.6–4.47)
LYMPHOCYTES NFR BLD AUTO: 29 % (ref 14–44)
MCH RBC QN AUTO: 32.1 PG (ref 26.8–34.3)
MCHC RBC AUTO-ENTMCNC: 32.1 G/DL (ref 31.4–37.4)
MCV RBC AUTO: 100 FL (ref 82–98)
MONOCYTES # BLD AUTO: 0.61 THOUSAND/ΜL (ref 0.17–1.22)
MONOCYTES NFR BLD AUTO: 11 % (ref 4–12)
NEUTROPHILS # BLD AUTO: 3.06 THOUSANDS/ΜL (ref 1.85–7.62)
NEUTS SEG NFR BLD AUTO: 57 % (ref 43–75)
NITRITE UR QL STRIP: POSITIVE
NON-SQ EPI CELLS URNS QL MICRO: ABNORMAL /HPF
NRBC BLD AUTO-RTO: 0 /100 WBCS
NT-PROBNP SERPL-MCNC: 201 PG/ML
PH UR STRIP.AUTO: 7 [PH] (ref 4.5–8)
PLATELET # BLD AUTO: 262 THOUSANDS/UL (ref 149–390)
PMV BLD AUTO: 10.2 FL (ref 8.9–12.7)
POTASSIUM SERPL-SCNC: 3.9 MMOL/L (ref 3.5–5.3)
PROT SERPL-MCNC: 7.6 G/DL (ref 6.4–8.2)
PROT UR STRIP-MCNC: NEGATIVE MG/DL
RBC # BLD AUTO: 4.17 MILLION/UL (ref 3.81–5.12)
RBC #/AREA URNS AUTO: ABNORMAL /HPF
SODIUM SERPL-SCNC: 136 MMOL/L (ref 136–145)
SP GR UR STRIP.AUTO: 1.01 (ref 1–1.03)
TRIGL SERPL-MCNC: 94 MG/DL
TSH SERPL DL<=0.05 MIU/L-ACNC: 1.6 UIU/ML (ref 0.36–3.74)
UROBILINOGEN UR QL STRIP.AUTO: 0.2 E.U./DL
WBC # BLD AUTO: 5.45 THOUSAND/UL (ref 4.31–10.16)
WBC #/AREA URNS AUTO: ABNORMAL /HPF

## 2018-11-09 PROCEDURE — 84443 ASSAY THYROID STIM HORMONE: CPT

## 2018-11-09 PROCEDURE — 36415 COLL VENOUS BLD VENIPUNCTURE: CPT

## 2018-11-09 PROCEDURE — 85025 COMPLETE CBC W/AUTO DIFF WBC: CPT

## 2018-11-09 PROCEDURE — 81001 URINALYSIS AUTO W/SCOPE: CPT | Performed by: INTERNAL MEDICINE

## 2018-11-09 PROCEDURE — 87186 SC STD MICRODIL/AGAR DIL: CPT

## 2018-11-09 PROCEDURE — 80053 COMPREHEN METABOLIC PANEL: CPT

## 2018-11-09 PROCEDURE — 80061 LIPID PANEL: CPT

## 2018-11-09 PROCEDURE — 87077 CULTURE AEROBIC IDENTIFY: CPT

## 2018-11-09 PROCEDURE — 83880 ASSAY OF NATRIURETIC PEPTIDE: CPT

## 2018-11-09 PROCEDURE — 87086 URINE CULTURE/COLONY COUNT: CPT

## 2018-11-09 NOTE — TELEPHONE ENCOUNTER
----- Message from Scott Chicas PA-C sent at 11/9/2018  2:54 PM EST -----  I ordered a urine culture please call her

## 2018-11-11 LAB — BACTERIA UR CULT: ABNORMAL

## 2018-11-13 ENCOUNTER — APPOINTMENT (OUTPATIENT)
Dept: LAB | Facility: CLINIC | Age: 78
End: 2018-11-13
Payer: MEDICARE

## 2018-11-13 DIAGNOSIS — Z12.11 COLON CANCER SCREENING: ICD-10-CM

## 2018-11-13 LAB — HEMOCCULT STL QL IA: NEGATIVE

## 2018-11-13 PROCEDURE — G0328 FECAL BLOOD SCRN IMMUNOASSAY: HCPCS

## 2018-11-26 ENCOUNTER — OFFICE VISIT (OUTPATIENT)
Dept: DERMATOLOGY | Facility: CLINIC | Age: 78
End: 2018-11-26
Payer: MEDICARE

## 2018-11-26 DIAGNOSIS — Z85.828 HISTORY OF SKIN CANCER: ICD-10-CM

## 2018-11-26 DIAGNOSIS — L82.1 SEBORRHEIC KERATOSIS: Primary | ICD-10-CM

## 2018-11-26 DIAGNOSIS — Z13.89 SCREENING FOR SKIN CONDITION: ICD-10-CM

## 2018-11-26 PROCEDURE — 99213 OFFICE O/P EST LOW 20 MIN: CPT | Performed by: DERMATOLOGY

## 2018-11-26 NOTE — PROGRESS NOTES
[facility] DERMATOLOGY  [address] [place] [address]  [phone]  [phone]     MRN: [id number] : 1940  Encounter: [id number]  Patient Information: Sophia Bird  Chief complaint:  Yearly checkup    History of present illness:  49-year-old female presents for overall skin check previous history of skin cancer no specific concerns noted  Past Medical History:   Diagnosis Date    Basal cell carcinoma     H/O colonoscopy     History of nonmelanoma skin cancer     last assessed 17    Inflamed seborrheic keratosis     Sciatica     Uterine fibroid     Viral warts      Past Surgical History:   Procedure Laterality Date    MOHS SURGERY  2012    BCC L [place]    THYROID SURGERY      TOTAL ABDOMINAL HYSTERECTOMY       Social History   History   Alcohol Use    Yes     Comment: Wine     History   Drug Use No     History   Smoking Status    Never Smoker   Smokeless Tobacco    Never Used     Family History   Problem Relation Age of Onset    Heart failure Mother     Arthritis Mother     Hypertension Mother     Kidney disease Sister      Meds/Allergies   Allergies   Allergen Reactions    Aspirin        Meds:  Prior to Admission medications    Medication Sig Start Date End Date Taking?  Authorizing Provider   atorvastatin (LIPITOR) 20 mg tablet Take 1 tablet (20 mg total) by mouth daily 3/14/18  Yes Angeli Campbell MD   Calcium-Vitamin D-Vitamin K 500-100-40 MG-UNT-MCG CHEW Chew   Yes Historical Provider, MD   conjugated estrogens (PREMARIN) vaginal cream Insert into the vagina 17  Yes Historical Provider, MD   Cranberry 1000 MG CAPS Take by mouth   Yes Historical Provider, MD   Flaxseed, Linseed, (FLAXSEED OIL) 1000 MG CAPS Take 1 capsule by mouth daily 3/19/14  Yes Historical Provider, MD   levothyroxine 50 mcg tablet Take 1 tablet (50 mcg total) by mouth daily 3/14/18  Yes Angeli Campbell MD   lisinopril (ZESTRIL) 10 mg tablet Take 1 tablet (10 mg total) by mouth daily 3/14/18  Yes Keri Lopez MD   metoprolol succinate (TOPROL-XL) 25 mg 24 hr tablet Take 1 tablet (25 mg total) by mouth daily 3/14/18  Yes Keri Lopez MD   Multiple Vitamins-Minerals (MULTIVITAMIN GUMMIES ADULT) [address] [facility] [address] [facility] [address]   Yes Historical Provider, MD   terconazole (TERAZOL 7) 0 4 % vaginal cream Insert 1 Applicatorful into the vagina 5/23/17  Yes Historical Provider, MD   trospium (SANCTURA XR) 60 mg 24 hr capsule Take 60 mg by mouth daily before breakfast   Yes Historical Provider, MD       Subjective:     Review of Systems:    General: negative for - chills, fatigue, fever,  weight gain or weight loss  Psychological: negative for - anxiety, behavioral disorder, concentration difficulties, decreased libido, depression, irritability, memory difficulties, mood swings, sleep disturbances or suicidal ideation  ENT: negative for - hearing difficulties , nasal congestion, nasal discharge, oral lesions, sinus pain, sneezing, sore throat  Allergy and Immunology: negative for - hives, insect bite sensitivity,  Hematological and Lymphatic: negative for - bleeding problems, blood clots,bruising, swollen lymph nodes  Endocrine: negative for - hair pattern changes, hot flashes, malaise/lethargy, mood swings, palpitations, polydipsia/polyuria, skin changes, temperature intolerance or unexpected weight change  Respiratory: negative for - cough, hemoptysis, orthopnea, shortness of breath, or wheezing  Cardiovascular: negative for - chest pain, dyspnea on exertion, edema,  Gastrointestinal: negative for - abdominal pain, nausea/vomiting  Genito-Urinary: negative for - dysuria, incontinence, irregular/heavy menses or urinary frequency/urgency  Musculoskeletal: negative for - gait disturbance, joint pain, joint stiffness, joint swelling, muscle pain, muscular weakness  Dermatological:  As in HPI  Neurological: negative for confusion, dizziness, headaches, impaired coordination/balance, memory loss, numbness/tingling, seizures, speech problems, tremors or weakness       Objective: There were no vitals taken for this visit  Physical Exam:    General Appearance:    Alert, cooperative, no distress   Head:    Normocephalic, without obvious abnormality, atraumatic           Skin:   A full skin exam was performed including scalp, head scalp, eyes, ears, nose, lips, neck, chest, axilla, abdomen, back, buttocks, bilateral upper extremities, bilateral lower extremities, hands, feet, fingers, toes, fingernails, and toenails normal keratotic papules greasy stuck on appearance nothing else atypical noted on complete exam previous sites of skin cancer well healed without recurrence     Assessment:     1  Seborrheic keratosis     2  Screening for skin condition     3  History of skin cancer           Plan:   Seborrheic keratosis patient reassured these are normal growths we acquire with age no treatment needed  History of skin cancer in no recurrence nothing else atypical sunblock recommended follow-up in 1 year  Screening for dermatologic disorders nothing else of concern noted on complete exam follow-up in 1 year      Jose Eduardo Cam MD  11/26/2018,2:47 PM    Portions of the record may have been created with voice recognition software   Occasional wrong word or "sound a like" substitutions may have occurred due to the inherent limitations of voice recognition software   Read the chart carefully and recognize, using context, where substitutions have occurred

## 2018-11-29 ENCOUNTER — HOSPITAL ENCOUNTER (OUTPATIENT)
Dept: NON INVASIVE DIAGNOSTICS | Facility: CLINIC | Age: 78
Discharge: HOME | End: 2018-11-29
Payer: MEDICARE

## 2018-11-29 DIAGNOSIS — I50.42 CHRONIC COMBINED SYSTOLIC AND DIASTOLIC HEART FAILURE (HCC): ICD-10-CM

## 2018-11-29 PROCEDURE — 93306 TTE W/DOPPLER COMPLETE: CPT

## 2018-11-29 PROCEDURE — 93306 TTE W/DOPPLER COMPLETE: CPT | Performed by: INTERNAL MEDICINE

## 2018-12-03 PROBLEM — I35.1 MODERATE AORTIC REGURGITATION: Status: ACTIVE | Noted: 2018-11-30

## 2018-12-04 ENCOUNTER — TELEPHONE (OUTPATIENT)
Dept: INTERNAL MEDICINE CLINIC | Facility: CLINIC | Age: 78
End: 2018-12-04

## 2018-12-04 NOTE — TELEPHONE ENCOUNTER
The lab work which was done in mid November was normal except for the urine culture and she already spoke with Shaista Hendricks about that  Echocardiogram shows concentric ventricular hypertrophy and diastolic dysfunction  What this means is that the heart is straining to work out against high blood pressure but there is no sign of heart failure  The message is to keep the blood pressure low

## 2019-02-07 ENCOUNTER — TELEPHONE (OUTPATIENT)
Dept: INTERNAL MEDICINE CLINIC | Facility: CLINIC | Age: 79
End: 2019-02-07

## 2019-02-07 NOTE — TELEPHONE ENCOUNTER
Pt cld and stated that she spoke to Shaista Hendricks back in December about the [facility] in her urine but doesn't remember what he said to do  Her sister passed away and hasn't had a chance to call  Please call pt and advise [phone]

## 2019-02-20 ENCOUNTER — OFFICE VISIT (OUTPATIENT)
Dept: OBGYN CLINIC | Age: 79
End: 2019-02-20
Payer: MEDICARE

## 2019-02-20 VITALS
DIASTOLIC BLOOD PRESSURE: 100 MMHG | BODY MASS INDEX: 28.41 KG/M2 | WEIGHT: 165.5 LBS | SYSTOLIC BLOOD PRESSURE: 142 MMHG

## 2019-02-20 DIAGNOSIS — N39.3 STRESS INCONTINENCE OF URINE: ICD-10-CM

## 2019-02-20 DIAGNOSIS — N95.2 ATROPHIC VAGINITIS: Primary | ICD-10-CM

## 2019-02-20 PROBLEM — Z13.89 SCREENING FOR SKIN CONDITION: Status: RESOLVED | Noted: 2018-11-26 | Resolved: 2019-02-20

## 2019-02-20 PROBLEM — Z12.39 BREAST SCREENING: Status: RESOLVED | Noted: 2018-11-07 | Resolved: 2019-02-20

## 2019-02-20 PROCEDURE — 87220 TISSUE EXAM FOR FUNGI: CPT | Performed by: NURSE PRACTITIONER

## 2019-02-20 PROCEDURE — 99213 OFFICE O/P EST LOW 20 MIN: CPT | Performed by: NURSE PRACTITIONER

## 2019-02-20 PROCEDURE — 87210 SMEAR WET MOUNT SALINE/INK: CPT | Performed by: NURSE PRACTITIONER

## 2019-02-20 NOTE — PATIENT INSTRUCTIONS
Vaginal Atrophy   WHAT YOU NEED TO KNOW:   Vaginal atrophy is a condition that causes thinning, drying, and inflammation of vaginal tissue  This condition is caused by decreased levels of estrogen (a female sex hormone)  Vaginal atrophy can increase your risk for vaginal and urinary tract infections  Vaginal atrophy can worsen over time if not treated  DISCHARGE INSTRUCTIONS:   Contact your healthcare provider if:   · You have a foul-smelling odor coming from your vagina  · You have a thick, cheese-like discharge from your vagina  · You have itching, swelling, or redness in your vagina  · You have pain or burning when you urinate  · Your urine smells bad  · Your symptoms do not improve, or they get worse  · You have questions or concerns about your condition or care  Medicines:   · Over-the counter vaginal moisturizers  can help reduce dryness  Your healthcare provider may recommend that you use a vaginal moisturizer several times each week and during sex  Only use creams that are made for vaginal use  Do  not  use petroleum jelly  Lubricants can be used during sex to decrease pain and discomfort  · Estrogen  may help decrease dryness  It may also lower your risk of vaginal infections if you are going through menopause  It can also help to relieve urinary symptoms  Estrogen may be prescribed in the form of a cream, tablet, or ring  These medicines can be applied or inserted into the vagina  Estrogen can also be prescribed in the form of a pill  · Take your medicine as directed  Contact your healthcare provider if you think your medicine is not helping or if you have side effects  Tell him of her if you are allergic to any medicine  Keep a list of the medicines, vitamins, and herbs you take  Include the amounts, and when and why you take them  Bring the list or the pill bottles to follow-up visits  Carry your medicine list with you in case of an emergency    Follow up with your healthcare provider as directed:  Write down your questions so you remember to ask them during your visits  © 2017 [employer] Information is for End User's use only and may not be sold, redistributed or otherwise used for commercial purposes  All illustrations and images included in CareNotes® are the copyrighted property of [employer]  or [employer]  The above information is an  only  It is not intended as medical advice for individual conditions or treatments  Talk to your doctor, nurse or pharmacist before following any medical regimen to see if it is safe and effective for you

## 2019-02-20 NOTE — PROGRESS NOTES
Diagnoses and all orders for this visit:    Atrophic vaginitis    Stress incontinence of urine        Call if no symptom improvement, all questions answered, return for annual         Pleasant [de-identified] y o  here for vaginal complaints  She would like refill of her vaginal premarin bc she states she has vaginal odor when she doesn't use it  Denies itching or d/c  Denies urianry issues or dysuria  She denies any treatments tried  Denies recent antibiotic use  Denies douching  Denies fever or pelvic pain  Hysterectomy pt  Not sexually active  On sanct[place] for [facility]-saw UroGyn at [address] for this      Past Medical History:   Diagnosis Date    Basal cell carcinoma     H/O colonoscopy     History of nonmelanoma skin cancer     last assessed 11/20/17    Inflamed seborrheic keratosis     Sciatica     Uterine fibroid     Viral warts      Past Surgical History:   Procedure Laterality Date    MOHS SURGERY  11/08/2012    BCC L [place]    THYROID SURGERY      TOTAL ABDOMINAL HYSTERECTOMY       Social History     Tobacco Use    Smoking status: Never Smoker    Smokeless tobacco: Never Used   Substance Use Topics    Alcohol use: Yes     Comment: Wine    Drug use: No     Family History   Problem Relation Age of Onset    Heart failure Mother     Arthritis Mother     Hypertension Mother     Kidney disease Sister        Current Outpatient Medications:     atorvastatin (LIPITOR) 20 mg tablet, Take 1 tablet (20 mg total) by mouth daily, Disp: 90 tablet, Rfl: 3    Calcium-Vitamin D-Vitamin K 500-100-40 MG-UNT-MCG CHEW, Chew, Disp: , Rfl:     conjugated estrogens (PREMARIN) vaginal cream, Insert into the vagina, Disp: , Rfl:     Cranberry 1000 MG CAPS, Take by mouth, Disp: , Rfl:     Flaxseed, Linseed, (FLAXSEED OIL) 1000 MG CAPS, Take 1 capsule by mouth daily, Disp: , Rfl:     levothyroxine 50 mcg tablet, Take 1 tablet (50 mcg total) by mouth daily, Disp: 90 tablet, Rfl: 3    lisinopril (ZESTRIL) 10 mg tablet, Take 1 tablet (10 mg total) by mouth daily, Disp: 90 tablet, Rfl: 3    metoprolol succinate (TOPROL-XL) 25 mg 24 hr tablet, Take 1 tablet (25 mg total) by mouth daily, Disp: 90 tablet, Rfl: 3    Multiple Vitamins-Minerals (MULTIVITAMIN GUMMIES ADULT) CHEW, Chew, Disp: , Rfl:     terconazole (TERAZOL 7) 0 4 % vaginal cream, Insert 1 Applicatorful into the vagina, Disp: , Rfl:     trospium (SANCTURA XR) 60 mg 24 hr capsule, Take 60 mg by mouth daily before breakfast, Disp: , Rfl:     Allergies   Allergen Reactions    Aspirin      OB History   No data available       Vitals:    02/20/19 0951   BP: 142/100   BP Location: Left arm   Patient Position: Sitting   Cuff Size: Standard   Weight: 75 1 kg (165 lb 8 oz)     Body mass index is 28 41 kg/m²  Review of Systems   Constitutional: Negative for chills, fatigue, fever and unexpected weight change  Respiratory: Negative for shortness of breath  Gastrointestinal: Negative for anal bleeding, blood in stool, constipation and diarrhea  Genitourinary: Negative for difficulty urinating, dysuria and hematuria  Physical Exam   Constitutional: She appears well-developed and well-nourished  No distress  HENT:   Head: Normocephalic  Neck: Normal range of motion  Neck supple  Pulmonary: Effort normal   Abdominal: Soft  Pelvic exam was performed with patient supine  No labial fusion  There is no rash, tenderness, lesion or injury on the right labia  There is no rash, tenderness, lesion or injury on the left labia  Uterus/Cx surgically absent  Right adnexum displays no mass, no tenderness and no fullness  Left adnexum displays no mass, no tenderness and no fullness  No erythema or tenderness in the vagina  No foreign body in the vagina  No signs of injury around the vagina  No vaginal discharge found  Lymphadenopathy:        Right: No inguinal adenopathy present  Left: No inguinal adenopathy present       Wet mount showed +hyphae, ph 4 5, no wbcs or trich, whiff neg

## 2019-02-21 DIAGNOSIS — N95.1 VAGINAL DRYNESS, MENOPAUSAL: Primary | ICD-10-CM

## 2019-02-21 NOTE — TELEPHONE ENCOUNTER
Patient saw Merrily Lefort yesterday in the [place] office and states that [facility] was going to send a refill for her vaginal premarin cream to the [facility] on [address] in [place]  Patient said the refill was never sent to her pharmacy and would like us to send it now if possible

## 2019-02-27 DIAGNOSIS — E03.9 HYPOTHYROIDISM, UNSPECIFIED TYPE: ICD-10-CM

## 2019-02-27 DIAGNOSIS — E78.5 HYPERLIPIDEMIA, UNSPECIFIED HYPERLIPIDEMIA TYPE: ICD-10-CM

## 2019-02-27 DIAGNOSIS — I10 HYPERTENSION, UNSPECIFIED TYPE: ICD-10-CM

## 2019-02-27 RX ORDER — LEVOTHYROXINE SODIUM 0.05 MG/1
50 TABLET ORAL DAILY
Qty: 90 TABLET | Refills: 3 | Status: SHIPPED | OUTPATIENT
Start: 2019-02-27 | End: 2020-03-31

## 2019-02-27 RX ORDER — LISINOPRIL 10 MG/1
TABLET ORAL
Qty: 90 TABLET | Refills: 3 | Status: SHIPPED | OUTPATIENT
Start: 2019-02-27 | End: 2020-02-12

## 2019-02-27 RX ORDER — METOPROLOL SUCCINATE 25 MG/1
25 TABLET, EXTENDED RELEASE ORAL DAILY
Qty: 90 TABLET | Refills: 3 | Status: SHIPPED | OUTPATIENT
Start: 2019-02-27 | End: 2020-02-12

## 2019-02-27 RX ORDER — ATORVASTATIN CALCIUM 20 MG/1
20 TABLET, FILM COATED ORAL DAILY
Qty: 90 TABLET | Refills: 3 | Status: SHIPPED | OUTPATIENT
Start: 2019-02-27 | End: 2020-02-12

## 2019-11-11 ENCOUNTER — APPOINTMENT (OUTPATIENT)
Dept: LAB | Facility: CLINIC | Age: 79
End: 2019-11-11
Payer: MEDICARE

## 2019-11-11 ENCOUNTER — OFFICE VISIT (OUTPATIENT)
Dept: INTERNAL MEDICINE CLINIC | Facility: CLINIC | Age: 79
End: 2019-11-11
Payer: MEDICARE

## 2019-11-11 VITALS
WEIGHT: 169.6 LBS | DIASTOLIC BLOOD PRESSURE: 80 MMHG | RESPIRATION RATE: 18 BRPM | HEART RATE: 68 BPM | BODY MASS INDEX: 26.62 KG/M2 | HEIGHT: 67 IN | TEMPERATURE: 97.7 F | SYSTOLIC BLOOD PRESSURE: 122 MMHG | OXYGEN SATURATION: 96 %

## 2019-11-11 DIAGNOSIS — Z23 ENCOUNTER FOR IMMUNIZATION: ICD-10-CM

## 2019-11-11 DIAGNOSIS — N39.3 STRESS INCONTINENCE OF URINE: ICD-10-CM

## 2019-11-11 DIAGNOSIS — I50.42 CHRONIC COMBINED SYSTOLIC AND DIASTOLIC HEART FAILURE (HCC): ICD-10-CM

## 2019-11-11 DIAGNOSIS — E03.9 ACQUIRED HYPOTHYROIDISM: ICD-10-CM

## 2019-11-11 DIAGNOSIS — Z23 ENCOUNTER FOR IMMUNIZATION: Primary | ICD-10-CM

## 2019-11-11 DIAGNOSIS — R07.89 CHEST WALL PAIN: ICD-10-CM

## 2019-11-11 DIAGNOSIS — I35.1 MODERATE AORTIC REGURGITATION: ICD-10-CM

## 2019-11-11 DIAGNOSIS — R79.9 ABNORMAL FINDING OF BLOOD CHEMISTRY, UNSPECIFIED: ICD-10-CM

## 2019-11-11 LAB
ALBUMIN SERPL BCP-MCNC: 3.6 G/DL (ref 3.5–5)
ALP SERPL-CCNC: 101 U/L (ref 46–116)
ALT SERPL W P-5'-P-CCNC: 24 U/L (ref 12–78)
ANION GAP SERPL CALCULATED.3IONS-SCNC: 5 MMOL/L (ref 4–13)
AST SERPL W P-5'-P-CCNC: 24 U/L (ref 5–45)
BACTERIA UR QL AUTO: ABNORMAL /HPF
BASOPHILS # BLD AUTO: 0.05 THOUSANDS/ΜL (ref 0–0.1)
BASOPHILS NFR BLD AUTO: 1 % (ref 0–1)
BILIRUB SERPL-MCNC: 0.41 MG/DL (ref 0.2–1)
BILIRUB UR QL STRIP: NEGATIVE
BUN SERPL-MCNC: 17 MG/DL (ref 5–25)
CALCIUM SERPL-MCNC: 9.2 MG/DL (ref 8.3–10.1)
CHLORIDE SERPL-SCNC: 108 MMOL/L (ref 100–108)
CHOLEST SERPL-MCNC: 184 MG/DL (ref 50–200)
CLARITY UR: ABNORMAL
CO2 SERPL-SCNC: 27 MMOL/L (ref 21–32)
COLOR UR: YELLOW
CREAT SERPL-MCNC: 0.87 MG/DL (ref 0.6–1.3)
EOSINOPHIL # BLD AUTO: 0.13 THOUSAND/ΜL (ref 0–0.61)
EOSINOPHIL NFR BLD AUTO: 2 % (ref 0–6)
ERYTHROCYTE [DISTWIDTH] IN BLOOD BY AUTOMATED COUNT: 14 % (ref 11.6–15.1)
EST. AVERAGE GLUCOSE BLD GHB EST-MCNC: 117 MG/DL
GFR SERPL CREATININE-BSD FRML MDRD: 63 ML/MIN/1.73SQ M
GLUCOSE SERPL-MCNC: 103 MG/DL (ref 65–140)
GLUCOSE UR STRIP-MCNC: NEGATIVE MG/DL
HBA1C MFR BLD: 5.7 % (ref 4.2–6.3)
HCT VFR BLD AUTO: 42.4 % (ref 34.8–46.1)
HDLC SERPL-MCNC: 64 MG/DL
HGB BLD-MCNC: 13.8 G/DL (ref 11.5–15.4)
HGB UR QL STRIP.AUTO: NEGATIVE
HYALINE CASTS #/AREA URNS LPF: ABNORMAL /LPF
IMM GRANULOCYTES # BLD AUTO: 0.01 THOUSAND/UL (ref 0–0.2)
IMM GRANULOCYTES NFR BLD AUTO: 0 % (ref 0–2)
KETONES UR STRIP-MCNC: NEGATIVE MG/DL
LDLC SERPL CALC-MCNC: 74 MG/DL (ref 0–100)
LEUKOCYTE ESTERASE UR QL STRIP: ABNORMAL
LYMPHOCYTES # BLD AUTO: 2.62 THOUSANDS/ΜL (ref 0.6–4.47)
LYMPHOCYTES NFR BLD AUTO: 40 % (ref 14–44)
MCH RBC QN AUTO: 31.9 PG (ref 26.8–34.3)
MCHC RBC AUTO-ENTMCNC: 32.5 G/DL (ref 31.4–37.4)
MCV RBC AUTO: 98 FL (ref 82–98)
MONOCYTES # BLD AUTO: 0.6 THOUSAND/ΜL (ref 0.17–1.22)
MONOCYTES NFR BLD AUTO: 9 % (ref 4–12)
NEUTROPHILS # BLD AUTO: 3.15 THOUSANDS/ΜL (ref 1.85–7.62)
NEUTS SEG NFR BLD AUTO: 48 % (ref 43–75)
NITRITE UR QL STRIP: NEGATIVE
NON-SQ EPI CELLS URNS QL MICRO: ABNORMAL /HPF
NONHDLC SERPL-MCNC: 120 MG/DL
NRBC BLD AUTO-RTO: 0 /100 WBCS
PH UR STRIP.AUTO: 6 [PH]
PLATELET # BLD AUTO: 234 THOUSANDS/UL (ref 149–390)
PMV BLD AUTO: 10.1 FL (ref 8.9–12.7)
POTASSIUM SERPL-SCNC: 3.8 MMOL/L (ref 3.5–5.3)
PROT SERPL-MCNC: 7.7 G/DL (ref 6.4–8.2)
PROT UR STRIP-MCNC: NEGATIVE MG/DL
RBC # BLD AUTO: 4.32 MILLION/UL (ref 3.81–5.12)
RBC #/AREA URNS AUTO: ABNORMAL /HPF
SODIUM SERPL-SCNC: 140 MMOL/L (ref 136–145)
SP GR UR STRIP.AUTO: 1.01 (ref 1–1.03)
TRIGL SERPL-MCNC: 229 MG/DL
TSH SERPL DL<=0.05 MIU/L-ACNC: 1.56 UIU/ML (ref 0.36–3.74)
UROBILINOGEN UR QL STRIP.AUTO: 0.2 E.U./DL
WBC # BLD AUTO: 6.56 THOUSAND/UL (ref 4.31–10.16)
WBC #/AREA URNS AUTO: ABNORMAL /HPF

## 2019-11-11 PROCEDURE — 80053 COMPREHEN METABOLIC PANEL: CPT

## 2019-11-11 PROCEDURE — 80061 LIPID PANEL: CPT

## 2019-11-11 PROCEDURE — 85025 COMPLETE CBC W/AUTO DIFF WBC: CPT

## 2019-11-11 PROCEDURE — 87077 CULTURE AEROBIC IDENTIFY: CPT | Performed by: PHYSICIAN ASSISTANT

## 2019-11-11 PROCEDURE — G0439 PPPS, SUBSEQ VISIT: HCPCS | Performed by: PHYSICIAN ASSISTANT

## 2019-11-11 PROCEDURE — 83036 HEMOGLOBIN GLYCOSYLATED A1C: CPT

## 2019-11-11 PROCEDURE — 90662 IIV NO PRSV INCREASED AG IM: CPT | Performed by: PHYSICIAN ASSISTANT

## 2019-11-11 PROCEDURE — 36415 COLL VENOUS BLD VENIPUNCTURE: CPT

## 2019-11-11 PROCEDURE — 87086 URINE CULTURE/COLONY COUNT: CPT | Performed by: PHYSICIAN ASSISTANT

## 2019-11-11 PROCEDURE — 87186 SC STD MICRODIL/AGAR DIL: CPT | Performed by: PHYSICIAN ASSISTANT

## 2019-11-11 PROCEDURE — G0008 ADMIN INFLUENZA VIRUS VAC: HCPCS | Performed by: PHYSICIAN ASSISTANT

## 2019-11-11 PROCEDURE — 84443 ASSAY THYROID STIM HORMONE: CPT

## 2019-11-11 PROCEDURE — 81001 URINALYSIS AUTO W/SCOPE: CPT | Performed by: PHYSICIAN ASSISTANT

## 2019-11-11 PROCEDURE — 99214 OFFICE O/P EST MOD 30 MIN: CPT | Performed by: PHYSICIAN ASSISTANT

## 2019-11-11 RX ORDER — OXYBUTYNIN CHLORIDE 10 MG/1
10 TABLET, EXTENDED RELEASE ORAL DAILY
Refills: 2 | COMMUNITY
Start: 2019-09-27 | End: 2022-08-09 | Stop reason: ALTCHOICE

## 2019-11-11 NOTE — PROGRESS NOTES
Assessment and Plan:     Problem List Items Addressed This Visit     None           Preventive health issues were discussed with patient, and age appropriate screening tests were ordered as noted in patient's After Visit Summary  Personalized health advice and appropriate referrals for health education or preventive services given if needed, as noted in patient's After Visit Summary  History of Present Illness:     Patient presents for Medicare Annual Wellness visit    Patient Care Team:  Bishnu Torre MD as PCP - General  Raman Hayes MD     Problem List:     Patient Active Problem List   Diagnosis    Chronic combined systolic and diastolic heart failure (HCC)    Stress incontinence of urine    Chest wall pain    Acquired hypothyroidism    Seborrheic keratosis    History of skin cancer    Moderate aortic regurgitation    Atrophic vaginitis      Past Medical and Surgical History:     Past Medical History:   Diagnosis Date    Basal cell carcinoma     H/O colonoscopy     History of nonmelanoma skin cancer     last assessed 11/20/17    Inflamed seborrheic keratosis     Sciatica     Uterine fibroid     Viral warts      Past Surgical History:   Procedure Laterality Date    MOHS SURGERY  11/08/2012    BCC L [place]    THYROID SURGERY      TOTAL ABDOMINAL HYSTERECTOMY        Family History:     Family History   Problem Relation Age of Onset    Heart failure Mother     Arthritis Mother     Hypertension Mother     Kidney disease Sister       Social History:     Social History     Socioeconomic History    Marital status:       Spouse name: Not on file    Number of children: Not on file    Years of education: Not on file    Highest education level: Not on file   Occupational History    Not on file   Social Needs    Financial resource strain: Not on file    Food insecurity:     Worry: Not on file     Inability: Not on file    Transportation needs:     Medical: Not on file Non-medical: Not on file   Tobacco Use    Smoking status: Never Smoker    Smokeless tobacco: Never Used   Substance and Sexual Activity    Alcohol use: Yes     Comment: Wine    Drug use: No    Sexual activity: Never   Lifestyle    Physical activity:     Days per week: Not on file     Minutes per session: Not on file    Stress: Not on file   Relationships    Social connections:     Talks on phone: Not on file     Gets together: Not on file     Attends Spiritism service: Not on file     Active member of club or organization: Not on file     Attends meetings of clubs or organizations: Not on file     Relationship status: Not on file    Intimate partner violence:     Fear of current or ex partner: Not on file     Emotionally abused: Not on file     Physically abused: Not on file     Forced sexual activity: Not on file   Other Topics Concern    Not on file   Social History Narrative    Active advance directive    Drinks coffee    Living independently alone       Medications and Allergies:     Current Outpatient Medications   Medication Sig Dispense Refill    atorvastatin (LIPITOR) 20 mg tablet TAKE 1 TABLET (20 MG TOTAL) BY MOUTH DAILY 90 tablet 3    Calcium-Vitamin D-Vitamin K 500-100-40 MG-UNT-MCG CHEW Chew      conjugated estrogens (PREMARIN) vaginal cream Insert 0 5 g intravaginally twice weekly 30 g 1    Cranberry 1000 MG CAPS Take by mouth      Flaxseed, Linseed, (FLAXSEED OIL) 1000 MG CAPS Take 1 capsule by mouth daily      levothyroxine 50 mcg tablet TAKE 1 TABLET (50 MCG TOTAL) BY MOUTH DAILY 90 tablet 3    lisinopril (ZESTRIL) 10 mg tablet TAKE 1 TABLET BY MOUTH EVERY DAY 90 tablet 3    metoprolol succinate (TOPROL-XL) 25 mg 24 hr tablet TAKE 1 TABLET (25 MG TOTAL) BY MOUTH DAILY 90 tablet 3    Multiple Vitamins-Minerals (MULTIVITAMIN GUMMIES ADULT) CHEW Chew      terconazole (TERAZOL 7) 0 4 % vaginal cream Insert 1 Applicatorful into the vagina      trospium (SANCTURA XR) 60 mg 24 hr capsule Take 60 mg by mouth daily before breakfast       No current facility-administered medications for this visit  Allergies   Allergen Reactions    Aspirin       Immunizations:     Immunization History   Administered Date(s) Administered    INFLUENZA 09/04/2014, 10/29/2015, 11/09/2016, 10/04/2017, 11/07/2018    Influenza Split High Dose Preservative Free IM 10/29/2015, 11/09/2016, 10/04/2017    Influenza TIV (IM) 10/15/2013    Influenza, high dose seasonal 0 5 mL 11/07/2018    Pneumococcal Conjugate 13-Valent 11/09/2016    Pneumococcal Polysaccharide PPV23 08/09/2012    Tdap 10/19/2005    Zoster 01/28/2013      Health Maintenance: There are no preventive care reminders to display for this patient  Topic Date Due    DTaP,Tdap,and Td Vaccines (2 - Td) 10/19/2015    INFLUENZA VACCINE  07/01/2019      Medicare Health Risk Assessment:     LMP  (LMP Unknown)      Glenis Jha is here for her Subsequent Wellness visit  Health Risk Assessment:   Patient rates overall health as good  Eyesight was rated as same  Hearing was rated as same  Patient feels that their emotional and mental health rating is same  Pain experienced in the last 7 days has been some  Patient's pain rating has been 3/10  Patient states that she has experienced no weight loss or gain in last 6 months  Depression Screening:   PHQ-2 Score: 0      Fall Risk Screening: In the past year, patient has experienced: no history of falling in past year      Urinary Incontinence Screening:   Patient has leaked urine accidently in the last six months  Home Safety:  Patient does not have trouble with stairs inside or outside of their home  Patient has working smoke alarms and has working carbon monoxide detector  Home safety hazards include: none  Nutrition:   Current diet is Regular  Medications:   Patient is currently taking over-the-counter supplements  OTC medications include: vitamin   Patient is able to manage medications  Activities of Daily Living (ADLs)/Instrumental Activities of Daily Living (IADLs):   Walk and transfer into and out of bed and chair?: Yes  Dress and groom yourself?: Yes    Bathe or shower yourself?: Yes    Feed yourself? Yes  Do your laundry/housekeeping?: Yes  Manage your money, pay your bills and track your expenses?: Yes  Make your own meals?: Yes    Do your own shopping?: Yes    Previous Hospitalizations:   Any hospitalizations or ED visits within the last 12 months?: No      Advance Care Planning:   Living will: Yes    Durable POA for healthcare:  Yes    Advanced directive: Yes    Advanced directive counseling given: Yes    Five wishes given: Yes    Patient declined ACP directive: No    End of Life Decisions reviewed with patient: Yes    Provider agrees with end of life decisions: Yes      Cognitive Screening:   Provider or family/friend/caregiver concerned regarding cognition?: No    PREVENTIVE SCREENINGS      Cardiovascular Screening:    General: Screening Not Indicated and History Lipid Disorder      Diabetes Screening:     General: Screening Current      Colorectal Cancer Screening:     General: Screening Current      Breast Cancer Screening:     General: Screening Current      Cervical Cancer Screening:    General: Screening Not Indicated      Osteoporosis Screening:    General: Risks and Benefits Discussed      Abdominal Aortic Aneurysm (AAA) Screening:        General: Risks and Benefits Discussed      Lung Cancer Screening:     General: Risks and Benefits Discussed      Hepatitis C Screening:    General: Risks and Benefits Discussed      Hilda Luo PA-C

## 2019-11-11 NOTE — PROGRESS NOTES
Assessment/Plan: she feels well no complaints physical exam unremarkable getting annual screening lab  Urine culture noted she has seen Urology November 12 to treat this       Diagnoses and all orders for this visit:    Encounter for immunization  -     influenza vaccine, [id number], high-dose, PF 0 5 mL (FLUZONE HIGH-DOSE)    Other orders  -     oxybutynin (DITROPAN-XL) 10 MG 24 hr tablet; Take 10 mg by mouth daily        No problem-specific Assessment & Plan notes found for this encounter  Subjective:      Patient ID: Dustin Ruth is a 78 y o  female  She is here for medical wellness  She feels well she has no complaints ambulatory active hypertension is stress incontinence controlled with meds hypothyroid controlled with meds recurrent skin cancers which have been followed by Dermatology      The following portions of the patient's history were reviewed and updated as appropriate:   She has a past medical history of Basal cell carcinoma, H/O colonoscopy, History of nonmelanoma skin cancer, Inflamed seborrheic keratosis, Sciatica, Uterine fibroid, and Viral warts  ,  does not have any pertinent problems on file  ,   has a past surgical history that includes Mohs surgery (11/08/2012); Thyroid surgery; and Total abdominal hysterectomy  ,  family history includes Arthritis in her mother; Heart failure in her mother; Hypertension in her mother; Kidney disease in her sister  ,   reports that she has never smoked  She has never used smokeless tobacco  She reports that she drinks alcohol  She reports that she does not use drugs  ,  is allergic to aspirin     Current Outpatient Medications   Medication Sig Dispense Refill    atorvastatin (LIPITOR) 20 mg tablet TAKE 1 TABLET (20 MG TOTAL) BY MOUTH DAILY 90 tablet 3    Calcium-Vitamin D-Vitamin K 500-100-40 MG-UNT-MCG CHEW Chew      conjugated estrogens (PREMARIN) vaginal cream Insert 0 5 g intravaginally twice weekly 30 g 1    Cranberry 1000 MG CAPS Take by mouth      Flaxseed, Linseed, (FLAXSEED OIL) 1000 MG CAPS Take 1 capsule by mouth daily      levothyroxine 50 mcg tablet TAKE 1 TABLET (50 MCG TOTAL) BY MOUTH DAILY 90 tablet 3    lisinopril (ZESTRIL) 10 mg tablet TAKE 1 TABLET BY MOUTH EVERY DAY 90 tablet 3    metoprolol succinate (TOPROL-XL) 25 mg 24 hr tablet TAKE 1 TABLET (25 MG TOTAL) BY MOUTH DAILY 90 tablet 3    Multiple Vitamins-Minerals (MULTIVITAMIN GUMMIES ADULT) CHEW Chew      oxybutynin (DITROPAN-XL) 10 MG 24 hr tablet Take 10 mg by mouth daily  2    terconazole (TERAZOL 7) 0 4 % vaginal cream Insert 1 Applicatorful into the vagina      trospium (SANCTURA XR) 60 mg 24 hr capsule Take 60 mg by mouth daily before breakfast       No current facility-administered medications for this visit  Review of Systems   Constitutional: Negative for activity change, appetite change, chills, diaphoresis, fatigue, fever and unexpected weight change  HENT: Negative for congestion, dental problem, drooling, ear discharge, ear pain, facial swelling, hearing loss, nosebleeds, postnasal drip, rhinorrhea, sinus pressure, sinus pain, sneezing, sore throat, tinnitus, trouble swallowing and voice change  Eyes: Negative for photophobia, pain, discharge, redness, itching and visual disturbance  Respiratory: Negative for apnea, cough, choking, chest tightness, shortness of breath, wheezing and stridor  Cardiovascular: Negative for chest pain, palpitations and leg swelling  Gastrointestinal: Negative for abdominal distention, abdominal pain, anal bleeding, blood in stool, constipation, diarrhea, nausea, rectal pain and vomiting  Endocrine: Negative for cold intolerance, heat intolerance, polydipsia, polyphagia and polyuria  Genitourinary: Negative for decreased urine volume, difficulty urinating, dysuria, enuresis, flank pain, frequency, genital sores, hematuria and urgency     Musculoskeletal: Negative for arthralgias, back pain, gait problem, joint swelling, myalgias, neck pain and neck stiffness  Skin: Negative for color change, pallor, rash and wound  Neurological: Negative for dizziness, tremors, seizures, syncope, facial asymmetry, speech difficulty, weakness, light-headedness, numbness and headaches  Hematological: Negative for adenopathy  Does not bruise/bleed easily  Psychiatric/Behavioral: Negative for agitation, behavioral problems, confusion, decreased concentration, dysphoric mood, hallucinations, self-injury, sleep disturbance and suicidal ideas  The patient is not nervous/anxious and is not hyperactive  Objective:  Vitals:    11/11/19 1308   BP: 122/80   BP Location: Left arm   Patient Position: Sitting   Cuff Size: Standard   Pulse: 68   Resp: 18   Temp: 97 7 °F (36 5 °C)   TempSrc: Tympanic   SpO2: 96%   Weight: 76 9 kg (169 lb 9 6 oz)   Height: 5' 7" (1 702 m)     Body mass index is 26 56 kg/m²  Physical Exam   Constitutional: She is oriented to person, place, and time  She appears well-developed  HENT:   Head: Normocephalic  Right Ear: External ear normal    Left Ear: External ear normal    Nose: Nose normal    Mouth/Throat: Oropharynx is clear and moist  No oropharyngeal exudate  Eyes: Pupils are equal, round, and reactive to light  Conjunctivae and EOM are normal    Neck: Normal range of motion  Neck supple  No JVD present  No thyromegaly present  Cardiovascular: Normal rate, regular rhythm, normal heart sounds and intact distal pulses  No murmur heard  Pulmonary/Chest: Effort normal and breath sounds normal  No stridor  No respiratory distress  Abdominal: Soft  Bowel sounds are normal  She exhibits no distension  There is no tenderness  Musculoskeletal: Normal range of motion  She exhibits no edema  Lymphadenopathy:     She has no cervical adenopathy  Neurological: She is alert and oriented to person, place, and time  She has normal reflexes  Skin: Skin is warm and dry  Vitals reviewed

## 2019-11-13 LAB
BACTERIA UR CULT: ABNORMAL
BACTERIA UR CULT: ABNORMAL

## 2019-12-03 ENCOUNTER — OFFICE VISIT (OUTPATIENT)
Dept: DERMATOLOGY | Facility: CLINIC | Age: 79
End: 2019-12-03
Payer: MEDICARE

## 2019-12-03 DIAGNOSIS — L98.9 UNKNOWN SKIN LESION: Primary | ICD-10-CM

## 2019-12-03 DIAGNOSIS — Z85.828 HISTORY OF SKIN CANCER: ICD-10-CM

## 2019-12-03 DIAGNOSIS — Z13.89 SCREENING FOR SKIN CONDITION: ICD-10-CM

## 2019-12-03 DIAGNOSIS — L82.1 SEBORRHEIC KERATOSIS: ICD-10-CM

## 2019-12-03 PROCEDURE — 11102 TANGNTL BX SKIN SINGLE LES: CPT | Performed by: DERMATOLOGY

## 2019-12-03 PROCEDURE — 99213 OFFICE O/P EST LOW 20 MIN: CPT | Performed by: DERMATOLOGY

## 2019-12-03 PROCEDURE — 11103 TANGNTL BX SKIN EA SEP/ADDL: CPT | Performed by: DERMATOLOGY

## 2019-12-03 PROCEDURE — 88305 TISSUE EXAM BY PATHOLOGIST: CPT | Performed by: PATHOLOGY

## 2019-12-03 NOTE — PATIENT INSTRUCTIONS
Skin lesions question squamous cell carcinoma await results of biopsy may be amenable to Efudex lesion on the knee probably would require excision  Seborrheic keratosis patient reassured these are normal growths we acquire with age no treatment needed  History of skin cancer in no recurrence nothing else atypical sunblock recommended follow-up in 1 year  Screening for dermatologic disorders nothing else of concern noted on complete exam follow-up in 1 year  Wound care instructions given to patient

## 2019-12-03 NOTE — PROGRESS NOTES
[address] [facility] DERMATOLOGY  [address]  [place] [address]  [phone]  [phone]     MRN: [id number] : 1940  Encounter: [id number]  Patient Information: Amira Mckinley  Chief complaint: yearly check up  History of present illness:  64-year-old female presents with previous history skin cancer presents for overall checkup for concerns regarding potential skin cancer patient notes lesion on her left upper elbow area and near her left knee that of both are irritated itching she has been using Bag balm on the area  No other concerns  Past Medical History:   Diagnosis Date    Basal cell carcinoma     H/O colonoscopy     History of nonmelanoma skin cancer     last assessed 15    Inflamed seborrheic keratosis     Sciatica     Uterine fibroid     Viral warts      Past Surgical History:   Procedure Laterality Date    MOHS SURGERY  2012    BCC L [place]    THYROID SURGERY      TOTAL ABDOMINAL HYSTERECTOMY       Social History   Social History     Substance and Sexual Activity   Alcohol Use Yes    Comment: Wine     Social History     Substance and Sexual Activity   Drug Use No     Social History     Tobacco Use   Smoking Status Never Smoker   Smokeless Tobacco Never Used     Family History   Problem Relation Age of Onset    Heart failure Mother     Arthritis Mother     Hypertension Mother     Kidney disease Sister      Meds/Allergies   Allergies   Allergen Reactions    Aspirin        Meds:  Prior to Admission medications    Medication Sig Start Date End Date Taking?  Authorizing Provider   atorvastatin (LIPITOR) 20 mg tablet TAKE 1 TABLET (20 MG TOTAL) BY MOUTH DAILY 19  Yes Heather Bar MD   Calcium-Vitamin D-Vitamin K [id number] MG-UNT-MCG CHEW Chew   Yes Historical Provider, MD   conjugated estrogens (PREMARIN) vaginal cream Insert 0 5 g intravaginally twice weekly 19  Yes LAURA Willson   Cranberry 1000 MG CAPS Take by mouth   Yes Historical Provider, MD   Flaxseed, Linseed, (FLAXSEED OIL) 1000 MG CAPS Take 1 capsule by mouth daily 3/19/14  Yes Historical Provider, MD   levothyroxine 50 mcg tablet TAKE 1 TABLET (50 MCG TOTAL) BY MOUTH DAILY 2/27/19  Yes Soumya Cain MD   lisinopril (ZESTRIL) 10 mg tablet TAKE 1 TABLET BY MOUTH EVERY DAY 2/27/19  Yes Soumya Cain MD   metoprolol succinate (TOPROL-XL) 25 mg 24 hr tablet TAKE 1 TABLET (25 MG TOTAL) BY MOUTH DAILY 2/27/19  Yes Soumya Cain MD   Multiple Vitamins-Minerals (MULTIVITAMIN GUMMIES ADULT) [facility] 16   Yes Historical Provider, MD   oxybutynin (DITROPAN-XL) 10 MG 24 hr tablet Take 10 mg by mouth daily 9/27/19  Yes Historical Provider, MD   terconazole (TERAZOL 7) 0 4 % vaginal cream Insert 1 Applicatorful into the vagina 5/23/17   Historical Provider, MD   trospium (SANCTURA XR) 60 mg 24 hr capsule Take 60 mg by mouth daily before breakfast    Historical Provider, MD       Subjective:     Review of Systems:    General: negative for - chills, fatigue, fever,  weight gain or weight loss  Psychological: negative for - anxiety, behavioral disorder, concentration difficulties, decreased libido, depression, irritability, memory difficulties, mood swings, sleep disturbances or suicidal ideation  ENT: negative for - hearing difficulties , nasal congestion, nasal discharge, oral lesions, sinus pain, sneezing, sore throat  Allergy and Immunology: negative for - hives, insect bite sensitivity,  Hematological and Lymphatic: negative for - bleeding problems, blood clots,bruising, swollen lymph nodes  Endocrine: negative for - hair pattern changes, hot flashes, malaise/lethargy, mood swings, palpitations, polydipsia/polyuria, skin changes, temperature intolerance or unexpected weight change  Respiratory: negative for - cough, hemoptysis, orthopnea, shortness of breath, or wheezing  Cardiovascular: negative for - chest pain, dyspnea on exertion, edema,  Gastrointestinal: negative for - abdominal pain, nausea/vomiting  Genito-Urinary: negative for - dysuria, incontinence, irregular/heavy menses or urinary frequency/urgency  Musculoskeletal: negative for - gait disturbance, joint pain, joint stiffness, joint swelling, muscle pain, muscular weakness  Dermatological:  As in HPI  Neurological: negative for confusion, dizziness, headaches, impaired coordination/balance, memory loss, numbness/tingling, seizures, speech problems, tremors or weakness       Objective:   LMP  (LMP Unknown)     Physical Exam:    General Appearance:    Alert, cooperative, no distress   Head:    Normocephalic, without obvious abnormality, atraumatic           Skin:   A full skin exam was performed including scalp, head scalp, eyes, ears, nose, lips, neck, chest, axilla, abdomen, back, buttocks, bilateral upper extremities, bilateral lower extremities, hands, feet, fingers, toes, fingernails, and toenails 2 cm scaling erythematous area noted on the left elbow also indurated is 8 mm papule noted on the left knee normal keratotic papules greasy stuck on appearance previous sites of skin cancer well healed without recurrence nothing else atypical noted on exam          Shave Biopsy Procedure Note    Pre-operative Diagnosis:  Rule out squamous cell carcinoma    Plan:  1  Instructed to keep the wound dry and covered for 24 and clean thereafter  2  Warning signs of infection were reviewed  3  Recommended that the patient use OTC acetaminophen as needed for pain  4  Return  Pending results of biopsy(ies)    Locations:  Left elbow left knee    Indications:  Suspicious lesions    Anesthesia: Lidocaine 1% with epinephrine without added sodium bicarbonate    Procedure Details     Patient informed of the risks (including bleeding and infection) and benefits of the   procedure and Verbal informed consent obtained      The lesion and surrounding area were given a sterile prep using alcohol and draped in the usual sterile fashion  A Blue blade razor was used to obtain a specimen  Hemostasis achieved with aluminum chloride  Petrolatum and a sterile dressing applied  The specimen was sent for pathologic examination  The patient tolerated the procedure(s) well  Complications:  none  Assessment:     1  Unknown skin lesion     2  Seborrheic keratosis     3  Screening for skin condition     4  History of skin cancer           Plan:   Skin lesions question squamous cell carcinoma await results of biopsy may be amenable to Efudex lesion on the knee probably would require excision  Seborrheic keratosis patient reassured these are normal growths we acquire with age no treatment needed  History of skin cancer in no recurrence nothing else atypical sunblock recommended follow-up in 1 year  Screening for dermatologic disorders nothing else of concern noted on complete exam follow-up in 1 year      Margarita Morfin MD  12/3/2019,1:31 PM    Portions of the record may have been created with voice recognition software   Occasional wrong word or "sound a like" substitutions may have occurred due to the inherent limitations of voice recognition software   Read the chart carefully and recognize, using context, where substitutions have occurred

## 2019-12-31 ENCOUNTER — PROCEDURE VISIT (OUTPATIENT)
Dept: DERMATOLOGY | Facility: CLINIC | Age: 79
End: 2019-12-31
Payer: MEDICARE

## 2019-12-31 DIAGNOSIS — D04.62 SQUAMOUS CELL CARCINOMA IN SITU (SCCIS) OF SKIN OF LEFT ELBOW: Primary | ICD-10-CM

## 2019-12-31 DIAGNOSIS — C44.719: ICD-10-CM

## 2019-12-31 PROCEDURE — 88305 TISSUE EXAM BY PATHOLOGIST: CPT | Performed by: STUDENT IN AN ORGANIZED HEALTH CARE EDUCATION/TRAINING PROGRAM

## 2019-12-31 PROCEDURE — 12032 INTMD RPR S/A/T/EXT 2.6-7.5: CPT | Performed by: DERMATOLOGY

## 2019-12-31 PROCEDURE — 17261 DSTRJ MAL LES T/A/L .6-1.0CM: CPT | Performed by: DERMATOLOGY

## 2019-12-31 PROCEDURE — 11603 EXC TR-EXT MAL+MARG 2.1-3 CM: CPT | Performed by: DERMATOLOGY

## 2019-12-31 RX ORDER — ESTRADIOL 0.1 MG/G
CREAM VAGINAL
COMMUNITY
Start: 2019-12-13

## 2019-12-31 NOTE — PROGRESS NOTES
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 and basal cell carcinoma left knee     Objective:   LMP  (LMP Unknown)     Physical Exam:    General Appearance:    Alert, cooperative, no distress   Skin:   Previous sites of biopsy noted    Procedure name: Excision with intermediate layered closure        Location:  Left elbow    Diagnosis: Squamous cell carcinoma    Size of lesion: 20 mm    Margins: 4 mm    Size + Margins: 28 mm    I explained the diagnosis to the patient and we recommend an excision of the lesion for diagnosis and/or treatment  Potential complications include, but are not limited to: scarring, bleeding, infection, incomplete removal, recurrence, and nerve damage  The risks, benefits, and alternatives were discussed with the patient in detail  The location of the tumor was identified, circled, and confirmed by the patient  The correct patient, site, and procedure were confirmed  Anesthesia: 1% xylocaine with 1:100,000 epinephrine 6 cc  The patient was brought into the room, prepped and draped sterilely in the usual manner and anesthesia was administered by local infiltration  A fusiform shape was drawn around the lesion, and the margins were incised to the level of the subcutaneous fat with a number 15cc Bard-Shan blade  The tissue was removed with sharp and blunt dissection  The lateral margins of the resulting defect were undermined with sharp and blunt dissection  Hemostasis was achieved with electrocautery  The deeper layers of the defect, including subcutaneous fat and fascia, had to be approximated to reduce tension on the suture line    Layered wound closure was performed  The wound was cleaned with saline, dried off, petroleum applied, and the wound was covered with a pressure dressing  The patient was given detailed verbal and written instructions on postoperative care  Suture for adipose/fascial/dermal layer closure: 4-0  vicryl 3 sutures interrupted    Suture used to approximate epidermis: 5-0  nylon 8 sutures interrupted    Final wound length: 4 5 cm    Follow-up in: 10 days  Patient tolerated procedure well without complications  Procedure: Curettage & Electrodessication of basal cell carcinoma  The reasons for the procedure were explained to the patient  The benefits and risks of the procedure were explained to the patient, including bleeding, infection, incomplete removal, prolonged anesthesia (weeks to months) and rarely nerve damage  The patient is aware that a scar will result from the procedure  The consent for the procedure was obtained verbally and in writing  Lesion Site:  Left knee Curetted Area Size (mm): 8    Using a sterile curette, the appropriate area was curetted  The area was curetted and electrodesiccated x3  Final defect size: 9mm    The wound was left to heal by secondary intention  The wound was cleansed then covered with a dressing  Wound care instructions were verbally given and in writing  I performed the entire procedure  Patient tolerated procedure well  Assessment:     1  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  Authorizing Provider   atorvastatin (LIPITOR) 20 mg tablet TAKE 1 TABLET (20 MG TOTAL) BY MOUTH DAILY 2/27/19  Yes Breonna Fernández MD   Calcium-Vitamin D-Vitamin K [id number] MG-UNT-MCG CHEW Chew   Yes Historical Provider, MD   Cranberry 1000 MG CAPS Take by mouth   Yes Historical Provider, MD   estradiol (ESTRACE) 0 1 mg/g vaginal cream  12/13/19  Yes Historical Provider, MD   Flaxseed, Linseed, (FLAXSEED OIL) 1000 MG CAPS Take 1 capsule by mouth daily 3/19/14  Yes Historical Provider, MD   levothyroxine 50 mcg tablet TAKE 1 TABLET (50 MCG TOTAL) BY MOUTH DAILY 2/27/19  Yes Ryanne Fields MD   lisinopril (ZESTRIL) 10 mg tablet TAKE 1 TABLET BY MOUTH EVERY DAY 2/27/19  Yes Ryanne Fields MD   metoprolol succinate (TOPROL-XL) 25 mg 24 hr tablet TAKE 1 TABLET (25 MG TOTAL) BY MOUTH DAILY 2/27/19  Yes Ryanne Fields MD   Multiple Vitamins-Minerals (MULTIVITAMIN GUMMIES ADULT) [facility] 16   Yes Historical Provider, MD   oxybutynin (DITROPAN-XL) 10 MG 24 hr tablet Take 10 mg by mouth daily 9/27/19  Yes Historical Provider, MD   trospium (SANCTURA XR) 60 mg 24 hr capsule Take 60 mg by mouth daily before breakfast   Yes Historical Provider, MD   conjugated estrogens (PREMARIN) vaginal cream Insert 0 5 g intravaginally twice weekly  Patient not taking: Reported on 12/31/2019 2/22/19   LAURA Willson   terconazole (TERAZOL 7) 0 4 % vaginal cream Insert 1 Applicatorful into the vagina 5/23/17   Historical Provider, MD     Allergies   Allergen Reactions    Aspirin        Haley Rodriguez MD  12/31/2019,2:31 PM    Portions of the record may have been created with voice recognition software   Occasional wrong word or "sound a like" substitutions may have occurred due to the inherent limitations of voice recognition software   Read the chart carefully and recognize, using context, where substitutions have occurred

## 2020-01-10 ENCOUNTER — CLINICAL SUPPORT (OUTPATIENT)
Dept: DERMATOLOGY | Facility: CLINIC | Age: 80
End: 2020-01-10

## 2020-01-10 DIAGNOSIS — D04.62 SQUAMOUS CELL CARCINOMA IN SITU (SCCIS) OF SKIN OF LEFT ELBOW: Primary | ICD-10-CM

## 2020-01-10 PROCEDURE — 99024 POSTOP FOLLOW-UP VISIT: CPT

## 2020-01-21 ENCOUNTER — TELEPHONE (OUTPATIENT)
Dept: DERMATOLOGY | Facility: CLINIC | Age: 80
End: 2020-01-21

## 2020-01-21 NOTE — TELEPHONE ENCOUNTER
----- Message from Teresita Dupont MD sent at 1/20/2020  4:30 PM EST -----  Patient referred for Mohs Surgery to Dr Swetha Russo

## 2020-02-12 DIAGNOSIS — E78.5 HYPERLIPIDEMIA, UNSPECIFIED HYPERLIPIDEMIA TYPE: ICD-10-CM

## 2020-02-12 DIAGNOSIS — I10 HYPERTENSION, UNSPECIFIED TYPE: ICD-10-CM

## 2020-02-12 RX ORDER — METOPROLOL SUCCINATE 25 MG/1
25 TABLET, EXTENDED RELEASE ORAL DAILY
Qty: 90 TABLET | Refills: 3 | Status: SHIPPED | OUTPATIENT
Start: 2020-02-12 | End: 2021-02-01

## 2020-02-12 RX ORDER — ATORVASTATIN CALCIUM 20 MG/1
20 TABLET, FILM COATED ORAL DAILY
Qty: 90 TABLET | Refills: 3 | Status: SHIPPED | OUTPATIENT
Start: 2020-02-12 | End: 2021-02-01

## 2020-02-12 RX ORDER — LISINOPRIL 10 MG/1
TABLET ORAL
Qty: 90 TABLET | Refills: 3 | Status: SHIPPED | OUTPATIENT
Start: 2020-02-12 | End: 2021-02-01

## 2020-03-31 DIAGNOSIS — E03.9 HYPOTHYROIDISM, UNSPECIFIED TYPE: ICD-10-CM

## 2020-03-31 RX ORDER — LEVOTHYROXINE SODIUM 0.05 MG/1
50 TABLET ORAL DAILY
Qty: 90 TABLET | Refills: 3 | Status: SHIPPED | OUTPATIENT
Start: 2020-03-31 | End: 2021-03-19

## 2020-06-16 ENCOUNTER — TELEPHONE (OUTPATIENT)
Dept: DERMATOLOGY | Facility: CLINIC | Age: 80
End: 2020-06-16

## 2020-06-17 ENCOUNTER — OFFICE VISIT (OUTPATIENT)
Dept: DERMATOLOGY | Facility: CLINIC | Age: 80
End: 2020-06-17
Payer: MEDICARE

## 2020-06-17 VITALS — TEMPERATURE: 97.4 F

## 2020-06-17 DIAGNOSIS — Z85.828 HISTORY OF SKIN CANCER: ICD-10-CM

## 2020-06-17 DIAGNOSIS — Z13.89 SCREENING FOR SKIN CONDITION: ICD-10-CM

## 2020-06-17 DIAGNOSIS — D04.62 SQUAMOUS CELL CARCINOMA IN SITU (SCCIS) OF SKIN OF LEFT ELBOW: Primary | ICD-10-CM

## 2020-06-17 DIAGNOSIS — L82.1 SEBORRHEIC KERATOSIS: ICD-10-CM

## 2020-06-17 DIAGNOSIS — L98.9 UNKNOWN SKIN LESION: ICD-10-CM

## 2020-06-17 PROCEDURE — 4040F PNEUMOC VAC/ADMIN/RCVD: CPT | Performed by: DERMATOLOGY

## 2020-06-17 PROCEDURE — 88305 TISSUE EXAM BY PATHOLOGIST: CPT | Performed by: STUDENT IN AN ORGANIZED HEALTH CARE EDUCATION/TRAINING PROGRAM

## 2020-06-17 PROCEDURE — 11102 TANGNTL BX SKIN SINGLE LES: CPT | Performed by: DERMATOLOGY

## 2020-06-17 PROCEDURE — 1036F TOBACCO NON-USER: CPT | Performed by: DERMATOLOGY

## 2020-06-17 PROCEDURE — 3079F DIAST BP 80-89 MM HG: CPT | Performed by: DERMATOLOGY

## 2020-06-17 PROCEDURE — 99213 OFFICE O/P EST LOW 20 MIN: CPT | Performed by: DERMATOLOGY

## 2020-06-17 PROCEDURE — 3074F SYST BP LT 130 MM HG: CPT | Performed by: DERMATOLOGY

## 2020-06-17 PROCEDURE — 1160F RVW MEDS BY RX/DR IN RCRD: CPT | Performed by: DERMATOLOGY

## 2020-07-20 ENCOUNTER — TELEPHONE (OUTPATIENT)
Dept: DERMATOLOGY | Facility: CLINIC | Age: 80
End: 2020-07-20

## 2020-07-21 ENCOUNTER — OFFICE VISIT (OUTPATIENT)
Dept: DERMATOLOGY | Facility: CLINIC | Age: 80
End: 2020-07-21
Payer: MEDICARE

## 2020-07-21 VITALS — TEMPERATURE: 98.2 F

## 2020-07-21 DIAGNOSIS — C44.619 BASAL CELL CARCINOMA (BCC) OF LEFT SHOULDER: Primary | ICD-10-CM

## 2020-07-21 PROCEDURE — 17260 DSTRJ MAL LES T/A/L 0.5 CM/<: CPT | Performed by: DERMATOLOGY

## 2020-07-21 NOTE — PROGRESS NOTES
[address] [facility] DERMATOLOGY  [address] [place] [address]  [phone]  [phone]     MRN: [id number] : 1940  Encounter: [id number]  Patient Information: Sophia Bird    Subjective:     57-year-old female presents for treatment of previously biopsied basal cell carcinoma left anterior shoulder     Objective:   Temp 98 2 °F (36 8 °C)   LMP  (LMP Unknown)     Physical Exam:    General Appearance:    Alert, cooperative, no distress   Skin:  Previous site biopsy noted  Procedure: Curettage & Electrodessication basal cell carcinoma  The reasons for the procedure were explained to the patient  The benefits and risks of the procedure were explained to the patient, including bleeding, infection, incomplete removal, prolonged anesthesia (weeks to months) and rarely nerve damage  The patient is aware that a scar will result from the procedure  The consent for the procedure was obtained verbally and in writing  Lesion Site:  left anterior shoulder Curetted Area Size (mm): 4    After alcohol prep 1% lidocaine with epinephrine anesthesia  Using a sterile curette, the appropriate area was curetted  The area was curetted and electrodesiccated x3  Final defect size: 5mm    The wound was left to heal by secondary intention  The wound was cleansed then covered with a dressing  Wound care instructions were verbally given and in writing  I performed the entire procedure  Patient tolerated procedure well  Assessment:     1  Basal cell carcinoma (BCC) of left shoulder           Plan:   Wound care instructions given to patient        Prior to Admission medications    Medication Sig Start Date End Date Taking?  Authorizing Provider   atorvastatin (LIPITOR) 20 mg tablet TAKE 1 TABLET (20 MG TOTAL) BY MOUTH DAILY 20   Angeli Campbell MD   Calcium-Vitamin D-Vitamin K [id number] MG-UNT-MCG CHEW Chew    Historical Provider, MD   conjugated estrogens (PREMARIN) vaginal cream Insert 0 5 g intravaginally twice weekly  Patient not taking: Reported on 12/31/2019 2/22/19   LAURA Willson   Cranberry 1000 MG CAPS Take by mouth    Historical Provider, MD   estradiol (ESTRACE) 0 1 mg/g vaginal cream  12/13/19   Historical Provider, MD   Flaxed, Linseed, (FLAXSEED OIL) 1000 MG CAPS Take 1 capsule by mouth daily 3/19/14   Historical Provider, MD   levothyroxine 50 mcg tablet TAKE 1 TABLET (50 MCG TOTAL) BY MOUTH DAILY 3/31/20   Concepcion Guy MD   lisinopril (ZESTRIL) 10 mg tablet TAKE 1 TABLET BY MOUTH EVERY DAY 2/12/20   Concepcion Guy MD   metoprolol succinate (TOPROL-XL) 25 mg 24 hr tablet TAKE 1 TABLET (25 MG TOTAL) BY MOUTH DAILY 2/12/20   Concepcion Guy MD   Multiple Vitamins-Minerals (MULTIVITAMIN GUMMIES ADULT) [facility] 16    Historical Provider, MD   oxybutynin (DITROPAN-XL) 10 MG 24 hr tablet Take 10 mg by mouth daily 9/27/19   Historical Provider, MD   terconazole (TERAZOL 7) 0 4 % vaginal cream Insert 1 Applicatorful into the vagina 5/23/17   Historical Provider, MD   trospium (SANCTURA XR) 60 mg 24 hr capsule Take 60 mg by mouth daily before breakfast    Historical Provider, MD     Allergies   Allergen Reactions    Aspirin        Gwenette Gosselin, MD  7/21/2020,11:55 AM    Portions of the record may have been created with voice recognition software   Occasional wrong word or "sound a like" substitutions may have occurred due to the inherent limitations of voice recognition software   Read the chart carefully and recognize, using context, where substitutions have occurred

## 2020-08-25 ENCOUNTER — OFFICE VISIT (OUTPATIENT)
Dept: INTERNAL MEDICINE CLINIC | Facility: CLINIC | Age: 80
End: 2020-08-25
Payer: MEDICARE

## 2020-08-25 ENCOUNTER — APPOINTMENT (OUTPATIENT)
Dept: LAB | Facility: CLINIC | Age: 80
End: 2020-08-25
Payer: MEDICARE

## 2020-08-25 VITALS
DIASTOLIC BLOOD PRESSURE: 70 MMHG | TEMPERATURE: 97.3 F | SYSTOLIC BLOOD PRESSURE: 130 MMHG | WEIGHT: 168 LBS | OXYGEN SATURATION: 99 % | HEART RATE: 58 BPM | HEIGHT: 66 IN | BODY MASS INDEX: 27 KG/M2

## 2020-08-25 DIAGNOSIS — E86.1 HYPOTENSION DUE TO HYPOVOLEMIA: ICD-10-CM

## 2020-08-25 DIAGNOSIS — I35.1 MODERATE AORTIC REGURGITATION: ICD-10-CM

## 2020-08-25 DIAGNOSIS — I50.42 CHRONIC COMBINED SYSTOLIC AND DIASTOLIC HEART FAILURE (HCC): ICD-10-CM

## 2020-08-25 DIAGNOSIS — I95.89 HYPOTENSION DUE TO HYPOVOLEMIA: ICD-10-CM

## 2020-08-25 DIAGNOSIS — E03.9 ACQUIRED HYPOTHYROIDISM: Primary | ICD-10-CM

## 2020-08-25 LAB
ALBUMIN SERPL BCP-MCNC: 3.7 G/DL (ref 3.5–5)
ALP SERPL-CCNC: 87 U/L (ref 46–116)
ALT SERPL W P-5'-P-CCNC: 24 U/L (ref 12–78)
ANION GAP SERPL CALCULATED.3IONS-SCNC: 5 MMOL/L (ref 4–13)
AST SERPL W P-5'-P-CCNC: 26 U/L (ref 5–45)
BACTERIA UR QL AUTO: ABNORMAL /HPF
BASOPHILS # BLD AUTO: 0.05 THOUSANDS/ΜL (ref 0–0.1)
BASOPHILS NFR BLD AUTO: 1 % (ref 0–1)
BILIRUB SERPL-MCNC: 0.73 MG/DL (ref 0.2–1)
BILIRUB UR QL STRIP: NEGATIVE
BUN SERPL-MCNC: 11 MG/DL (ref 5–25)
CALCIUM SERPL-MCNC: 9.4 MG/DL (ref 8.3–10.1)
CHLORIDE SERPL-SCNC: 105 MMOL/L (ref 100–108)
CHOLEST SERPL-MCNC: 208 MG/DL (ref 50–200)
CLARITY UR: ABNORMAL
CO2 SERPL-SCNC: 30 MMOL/L (ref 21–32)
COLOR UR: YELLOW
CREAT SERPL-MCNC: 0.78 MG/DL (ref 0.6–1.3)
EOSINOPHIL # BLD AUTO: 0.09 THOUSAND/ΜL (ref 0–0.61)
EOSINOPHIL NFR BLD AUTO: 2 % (ref 0–6)
ERYTHROCYTE [DISTWIDTH] IN BLOOD BY AUTOMATED COUNT: 14.1 % (ref 11.6–15.1)
GFR SERPL CREATININE-BSD FRML MDRD: 72 ML/MIN/1.73SQ M
GLUCOSE P FAST SERPL-MCNC: 94 MG/DL (ref 65–99)
GLUCOSE UR STRIP-MCNC: NEGATIVE MG/DL
HCT VFR BLD AUTO: 44 % (ref 34.8–46.1)
HDLC SERPL-MCNC: 65 MG/DL
HGB BLD-MCNC: 14.5 G/DL (ref 11.5–15.4)
HGB UR QL STRIP.AUTO: NEGATIVE
HYALINE CASTS #/AREA URNS LPF: ABNORMAL /LPF
IMM GRANULOCYTES # BLD AUTO: 0 THOUSAND/UL (ref 0–0.2)
IMM GRANULOCYTES NFR BLD AUTO: 0 % (ref 0–2)
KETONES UR STRIP-MCNC: NEGATIVE MG/DL
LDLC SERPL CALC-MCNC: 110 MG/DL (ref 0–100)
LEUKOCYTE ESTERASE UR QL STRIP: ABNORMAL
LYMPHOCYTES # BLD AUTO: 1.63 THOUSANDS/ΜL (ref 0.6–4.47)
LYMPHOCYTES NFR BLD AUTO: 31 % (ref 14–44)
MCH RBC QN AUTO: 32.8 PG (ref 26.8–34.3)
MCHC RBC AUTO-ENTMCNC: 33 G/DL (ref 31.4–37.4)
MCV RBC AUTO: 100 FL (ref 82–98)
MONOCYTES # BLD AUTO: 0.45 THOUSAND/ΜL (ref 0.17–1.22)
MONOCYTES NFR BLD AUTO: 9 % (ref 4–12)
NEUTROPHILS # BLD AUTO: 3.07 THOUSANDS/ΜL (ref 1.85–7.62)
NEUTS SEG NFR BLD AUTO: 57 % (ref 43–75)
NITRITE UR QL STRIP: NEGATIVE
NON-SQ EPI CELLS URNS QL MICRO: ABNORMAL /HPF
NONHDLC SERPL-MCNC: 143 MG/DL
NRBC BLD AUTO-RTO: 0 /100 WBCS
PH UR STRIP.AUTO: 7 [PH]
PLATELET # BLD AUTO: 234 THOUSANDS/UL (ref 149–390)
PMV BLD AUTO: 10.5 FL (ref 8.9–12.7)
POTASSIUM SERPL-SCNC: 4.1 MMOL/L (ref 3.5–5.3)
PROT SERPL-MCNC: 7.7 G/DL (ref 6.4–8.2)
PROT UR STRIP-MCNC: NEGATIVE MG/DL
RBC # BLD AUTO: 4.42 MILLION/UL (ref 3.81–5.12)
RBC #/AREA URNS AUTO: ABNORMAL /HPF
SODIUM SERPL-SCNC: 140 MMOL/L (ref 136–145)
SP GR UR STRIP.AUTO: 1.01 (ref 1–1.03)
TRIGL SERPL-MCNC: 163 MG/DL
TSH SERPL DL<=0.05 MIU/L-ACNC: 1.43 UIU/ML (ref 0.36–3.74)
UROBILINOGEN UR QL STRIP.AUTO: 0.2 E.U./DL
WBC # BLD AUTO: 5.29 THOUSAND/UL (ref 4.31–10.16)
WBC #/AREA URNS AUTO: ABNORMAL /HPF

## 2020-08-25 PROCEDURE — 4040F PNEUMOC VAC/ADMIN/RCVD: CPT | Performed by: PHYSICIAN ASSISTANT

## 2020-08-25 PROCEDURE — 3075F SYST BP GE 130 - 139MM HG: CPT | Performed by: PHYSICIAN ASSISTANT

## 2020-08-25 PROCEDURE — 1160F RVW MEDS BY RX/DR IN RCRD: CPT | Performed by: PHYSICIAN ASSISTANT

## 2020-08-25 PROCEDURE — 81001 URINALYSIS AUTO W/SCOPE: CPT | Performed by: PHYSICIAN ASSISTANT

## 2020-08-25 PROCEDURE — 1036F TOBACCO NON-USER: CPT | Performed by: PHYSICIAN ASSISTANT

## 2020-08-25 PROCEDURE — 80053 COMPREHEN METABOLIC PANEL: CPT

## 2020-08-25 PROCEDURE — 3078F DIAST BP <80 MM HG: CPT | Performed by: PHYSICIAN ASSISTANT

## 2020-08-25 PROCEDURE — 99214 OFFICE O/P EST MOD 30 MIN: CPT | Performed by: PHYSICIAN ASSISTANT

## 2020-08-25 PROCEDURE — 3008F BODY MASS INDEX DOCD: CPT | Performed by: PHYSICIAN ASSISTANT

## 2020-08-25 PROCEDURE — 84443 ASSAY THYROID STIM HORMONE: CPT

## 2020-08-25 PROCEDURE — 36415 COLL VENOUS BLD VENIPUNCTURE: CPT

## 2020-08-25 PROCEDURE — 80061 LIPID PANEL: CPT

## 2020-08-25 PROCEDURE — 85025 COMPLETE CBC W/AUTO DIFF WBC: CPT

## 2020-08-25 NOTE — PROGRESS NOTES
Assessment/Plan: she is asymptomatic she is not orthostatic  Will get some labs including a urine  Physical exam unremarkable  She will return if her orthostatic dizziness recurs       Diagnoses and all orders for this visit:    Acquired hypothyroidism    Chronic combined systolic and diastolic heart failure (HCC)  -     Comprehensive metabolic panel; Future  -     CBC and differential; Future  -     Lipid panel; Future  -     UA w Reflex to Microscopic w Reflex to Culture  -     TSH, 3rd generation; Future    Moderate aortic regurgitation  -     Comprehensive metabolic panel; Future  -     CBC and differential; Future  -     Lipid panel; Future  -     UA w Reflex to Microscopic w Reflex to Culture  -     TSH, 3rd generation; Future    Hypotension due to hypovolemia  -     Comprehensive metabolic panel; Future  -     CBC and differential; Future  -     Lipid panel; Future  -     UA w Reflex to Microscopic w Reflex to Culture  -     TSH, 3rd generation; Future        No problem-specific Assessment & Plan notes found for this encounter  BMI Counseling: Body mass index is 27 12 kg/m²  The BMI is above normal  Nutrition recommendations include decreasing portion sizes  Exercise recommendations include exercising 3-5 times per week  Subjective:      Patient ID: Fam Pinto is a 78 y o  female  A month ago she had an episode of everything looking blue when she 1st got up in the morning and stood up this lasted 10 minutes or so and and cleared the following morning she noticed that everything looked red when she 1st stood up  She felt somewhat dizzy her vision and her dizziness cleared within a few minutes  Since then she has been fine no more dizziness or orthostatic symptoms  She went to an eye doctor who said everything was normal and that her symptoms were from low blood pressure    Has a history of hypertension and hypothyroid that have been well controlled with meds      The following portions of the patient's history were reviewed and updated as appropriate:   She has a past medical history of Basal cell carcinoma, H/O colonoscopy, History of nonmelanoma skin cancer, Inflamed seborrheic keratosis, Sciatica, Uterine fibroid, and Viral warts  ,  does not have any pertinent problems on file  ,   has a past surgical history that includes Mohs surgery (11/08/2012); Thyroid surgery; and Total abdominal hysterectomy  ,  family history includes Arthritis in her mother; Heart failure in her mother; Hypertension in her mother; Kidney disease in her sister  ,   reports that she has never smoked  She has never used smokeless tobacco  She reports current alcohol use  She reports that she does not use drugs  ,  is allergic to aspirin     Current Outpatient Medications   Medication Sig Dispense Refill    atorvastatin (LIPITOR) 20 mg tablet TAKE 1 TABLET (20 MG TOTAL) BY MOUTH DAILY 90 tablet 3    Calcium-Vitamin D-Vitamin K [id number] MG-UNT-MCG CHEW Chew      conjugated estrogens (PREMARIN) vaginal cream Insert 0 5 g intravaginally twice weekly 30 g 1    Cranberry 1000 MG CAPS Take by mouth      estradiol (ESTRACE) 0 1 mg/g vaginal cream       Flaxseed, Linseed, (FLAXSEED OIL) 1000 MG CAPS Take 1 capsule by mouth daily      levothyroxine 50 mcg tablet TAKE 1 TABLET (50 MCG TOTAL) BY MOUTH DAILY 90 tablet 3    lisinopril (ZESTRIL) 10 mg tablet TAKE 1 TABLET BY MOUTH EVERY DAY 90 tablet 3    metoprolol succinate (TOPROL-XL) 25 mg 24 hr tablet TAKE 1 TABLET (25 MG TOTAL) BY MOUTH DAILY 90 tablet 3    Multiple Vitamins-Minerals (MULTIVITAMIN GUMMIES ADULT) CHEW Chew      oxybutynin (DITROPAN-XL) 10 MG 24 hr tablet Take 10 mg by mouth daily  2    terconazole (TERAZOL 7) 0 4 % vaginal cream Insert 1 Applicatorful into the vagina      trospium (SANCTURA XR) 60 mg 24 hr capsule Take 60 mg by mouth daily before breakfast       No current facility-administered medications for this visit          Review of Systems Constitutional: Negative for activity change, appetite change, chills, diaphoresis, fatigue, fever and unexpected weight change  HENT: Negative for congestion, dental problem, drooling, ear discharge, ear pain, facial swelling, hearing loss, nosebleeds, postnasal drip, rhinorrhea, sinus pressure, sinus pain, sneezing, sore throat, tinnitus, trouble swallowing and voice change  Eyes: Negative for photophobia, pain, discharge, redness, itching and visual disturbance  Respiratory: Negative for apnea, cough, choking, chest tightness, shortness of breath, wheezing and stridor  Cardiovascular: Negative for chest pain, palpitations and leg swelling  Gastrointestinal: Negative for abdominal distention, abdominal pain, anal bleeding, blood in stool, constipation, diarrhea, nausea, rectal pain and vomiting  Endocrine: Negative for cold intolerance, heat intolerance, polydipsia, polyphagia and polyuria  Genitourinary: Negative for decreased urine volume, difficulty urinating, dysuria, enuresis, flank pain, frequency, genital sores, hematuria and urgency  Musculoskeletal: Negative for arthralgias, back pain, gait problem, joint swelling, myalgias, neck pain and neck stiffness  Skin: Negative for color change, pallor, rash and wound  Neurological: Negative for dizziness, tremors, seizures, syncope, facial asymmetry, speech difficulty, weakness, light-headedness, numbness and headaches  Hematological: Negative for adenopathy  Does not bruise/bleed easily  Psychiatric/Behavioral: Negative for agitation, behavioral problems, confusion, decreased concentration, dysphoric mood, hallucinations, self-injury, sleep disturbance and suicidal ideas  The patient is not nervous/anxious and is not hyperactive            Objective:  Vitals:    08/25/20 1103   BP: 130/70   BP Location: Left arm   Patient Position: Sitting   Cuff Size: Standard   Pulse: 58   Temp: (!) 97 3 °F (36 3 °C)   SpO2: 99%   Weight: 76 2 kg (168 lb)   Height: 5' 6" (1 676 m)     Body mass index is 27 12 kg/m²  Physical Exam  Vitals signs reviewed  Constitutional:       Appearance: She is well-developed  HENT:      Head: Normocephalic  Right Ear: Tympanic membrane and external ear normal       Left Ear: Tympanic membrane and external ear normal       Nose: Nose normal       Mouth/Throat:      Mouth: Mucous membranes are moist    Eyes:      Conjunctiva/sclera: Conjunctivae normal       Pupils: Pupils are equal, round, and reactive to light  Neck:      Musculoskeletal: Normal range of motion and neck supple  Thyroid: No thyromegaly  Cardiovascular:      Rate and Rhythm: Normal rate and regular rhythm  Heart sounds: Murmur ( soft  apical) present  Pulmonary:      Effort: Pulmonary effort is normal  No respiratory distress  Breath sounds: Normal breath sounds  No wheezing or rales  Abdominal:      General: Abdomen is flat  Bowel sounds are normal  There is no distension  Palpations: Abdomen is soft  Tenderness: There is no abdominal tenderness  There is no guarding  Musculoskeletal: Normal range of motion  General: No swelling or tenderness  Skin:     General: Skin is warm and dry  Neurological:      General: No focal deficit present  Mental Status: She is alert and oriented to person, place, and time  Cranial Nerves: No cranial nerve deficit  Sensory: No sensory deficit  Motor: No weakness  Coordination: Coordination normal       Gait: Gait normal       Deep Tendon Reflexes: Reflexes are normal and symmetric  Reflexes normal    Psychiatric:         Mood and Affect: Mood normal          Thought Content:  Thought content normal          Judgment: Judgment normal

## 2020-09-03 ENCOUNTER — TELEPHONE (OUTPATIENT)
Dept: INTERNAL MEDICINE CLINIC | Facility: CLINIC | Age: 80
End: 2020-09-03

## 2020-09-15 ENCOUNTER — TELEPHONE (OUTPATIENT)
Dept: BARIATRICS | Facility: CLINIC | Age: 80
End: 2020-09-15

## 2020-09-15 NOTE — TELEPHONE ENCOUNTER
COVID Pre-Visit Screening  1  Is this a family member screening? NO:[id number]}  2  Have you traveled outside of your state in the past 2 weeks? [id number]}  3  Do you presently have a fever or flu-like symptoms? NO:63139}  4  Do you have symptoms of an upper respiratory infection like runny nose, sore throat, or cough? NO:01758}  5  Are you suffering from new headache that you have not had in the past?  NO:23274}  6  Do you have/have you experienced any new shortness of breath recently? NO:80144}  7  Do you have any new diarrhea, nausea or vomiting? NO:58057}  8  Have you been in contact with anyone who has been sick or diagnosed with COVID-19? NO:31698}  9  Do you have any new loss of taste or smell? NO:61385}  10  Are you able to wear a mask without a valve for the entire visit?  YES

## 2020-09-16 ENCOUNTER — OFFICE VISIT (OUTPATIENT)
Dept: BARIATRICS | Facility: CLINIC | Age: 80
End: 2020-09-16
Payer: MEDICARE

## 2020-09-16 VITALS
HEART RATE: 66 BPM | TEMPERATURE: 97.8 F | WEIGHT: 172.2 LBS | SYSTOLIC BLOOD PRESSURE: 140 MMHG | DIASTOLIC BLOOD PRESSURE: 84 MMHG | BODY MASS INDEX: 27.68 KG/M2 | HEIGHT: 66 IN

## 2020-09-16 DIAGNOSIS — E03.9 ACQUIRED HYPOTHYROIDISM: ICD-10-CM

## 2020-09-16 DIAGNOSIS — I50.42 CHRONIC COMBINED SYSTOLIC AND DIASTOLIC HEART FAILURE (HCC): ICD-10-CM

## 2020-09-16 DIAGNOSIS — E66.3 OVERWEIGHT: ICD-10-CM

## 2020-09-16 DIAGNOSIS — E78.5 DYSLIPIDEMIA: Primary | ICD-10-CM

## 2020-09-16 PROCEDURE — 99203 OFFICE O/P NEW LOW 30 MIN: CPT | Performed by: INTERNAL MEDICINE

## 2020-09-16 NOTE — PROGRESS NOTES
Assessment/Plan:  Maggie Miller was seen today for consult  Diagnoses and all orders for this visit:    Dyslipidemia  On lipitor 20mg  Increase activity level  Weight loss should help improve the lipid levels  Avoid eating foods high in trans fats and animal-derived saturated fats    Chronic combined systolic and diastolic heart failure (HCC)  Currently on lisinopril, metoprolol succ    Acquired hypothyroidism  Last thyroid studies WNL  On levoxyl 50 mcg    Overweight  - Discussed options of [facility]-Intensive Lifestyle Intervention Program and Conservative Program and the role of weight loss medications  - Explained the importance of making lifestyle changes  Patient should demonstrate lifestyle changes first before anti-obesity medication can be initiated  - Patient is interested in pursuing Conservative Program  - Initial weight loss goal of 5-10% weight loss for improved health  - Screening labs    Goals:  Do not skip any meals! No sugary beverages  At least 40oz of water daily  Increase physical activity by 5 minutes daily  Goal protein 70 g per day  Goal carbohydrates 70g per day   3421-8311 calories    45 minute visit, >50% face-to-face time spent counseling patient on surgical and nonsurgical interventions for the treatment of excess weight  Discussed the advantages and long-term outcomes with regards to bariatric surgery  Discussed in detail nonsurgical options including intensive lifestyle intervention program, very low-calorie diet program and conservative program   Discussed the role of weight loss medications  Counseled patient on diet behavior and exercise modification for weight loss  Follow up in approximately 1 month with Non-Surgical Physician/Advanced Practitioner  Subjective:   Chief Complaint   Patient presents with    Consult     Patient is here for initial MWM consult with Dr Cecelia Mortensen  BMI 28 2  Negative stop bang (2/8)         Patient ID: Finn Melendez  is a 78 y o  female with excess weight/obesity here to pursue weight management  Past Medical History:   Diagnosis Date    Basal cell carcinoma     H/O colonoscopy     History of nonmelanoma skin cancer     last assessed 17    Hyperlipidemia     Hypertension     Inflamed seborrheic keratosis     Overweight with body mass index (BMI) of 27 to 27 9 in adult     Sciatica     Uterine fibroid     Viral warts      Past Surgical History:   Procedure Laterality Date    MOHS SURGERY  2012    BCC L [place]    THYROID SURGERY      TOTAL ABDOMINAL HYSTERECTOMY         HPI:  Obesity/Excess Weight: 13 lbs   Severity: overweight  Onset:      Modifiers: Diet and Exercise  Contributing factors: Poor Food Choices and Insufficient Physical Activity  Associated symptoms: clothes do not fit    B- grape nuts cereal with blueberries  S-  L- cottage cheese, apple butter, granola bar  S- popcorn  D- beets, fried shrimp   S- lemon pudding    Hydration: 16 oz water, unsweetened iced tea, milk   Alcohol: none  Smoking: none  Exercise: foot pedal bike  Sleep: 7 hours   STOP ban/8    The following portions of the patient's history were reviewed and updated as appropriate: allergies, current medications, past family history, past medical history, past social history, past surgical history, and problem list     Review of Systems   Constitutional: Negative for appetite change, chills and fever  HENT: Negative for rhinorrhea and sore throat  Respiratory: Negative for cough, chest tightness and shortness of breath  Cardiovascular: Negative for chest pain and leg swelling  Gastrointestinal: Negative for abdominal pain, constipation, diarrhea, nausea and vomiting  Endocrine: Negative for cold intolerance and heat intolerance  Genitourinary: Negative for difficulty urinating  Musculoskeletal: Negative for arthralgias  Skin: Negative for color change  Neurological: Negative for dizziness, numbness and headaches  Psychiatric/Behavioral: Negative for sleep disturbance  The patient is not nervous/anxious  All other systems reviewed and are negative  Objective:  /84 (BP Location: Left arm, Patient Position: Sitting, Cuff Size: Standard)   Pulse 66   Temp 97 8 °F (36 6 °C) (Tympanic)   Ht 5' 5 5" (1 664 m)   Wt 78 1 kg (172 lb 3 2 oz)   LMP  (LMP Unknown)   BMI 28 22 kg/m²   Constitutional: Well-developed, well-nourished and obese Body mass index is 28 22 kg/m²  [facility] HEENT: No conjunctival pallor or jaundice  Pulmonary: No increased work of breathing or signs of respiratory distress  Clear to auscultation  CV: Normal rate and rhythm, S1 and S2, without murmurs  GI: Obese  Normal bowel sounds  Soft and nontender  MSK: No edema   Neuro: Oriented to person, place and time  Normal Speech  Normal gait  Psych: Normal affect and mood       Labs and Imaging  Reviewed   Colonoscopy-Not applicable

## 2020-10-20 ENCOUNTER — OFFICE VISIT (OUTPATIENT)
Dept: BARIATRICS | Facility: CLINIC | Age: 80
End: 2020-10-20
Payer: MEDICARE

## 2020-10-20 VITALS
WEIGHT: 165 LBS | SYSTOLIC BLOOD PRESSURE: 128 MMHG | DIASTOLIC BLOOD PRESSURE: 80 MMHG | HEIGHT: 66 IN | HEART RATE: 58 BPM | BODY MASS INDEX: 26.52 KG/M2 | TEMPERATURE: 97.9 F

## 2020-10-20 DIAGNOSIS — E78.5 HYPERLIPIDEMIA: Primary | ICD-10-CM

## 2020-10-20 DIAGNOSIS — E66.3 OVERWEIGHT: ICD-10-CM

## 2020-10-20 DIAGNOSIS — I50.42 CHRONIC COMBINED SYSTOLIC AND DIASTOLIC HEART FAILURE (HCC): ICD-10-CM

## 2020-10-20 DIAGNOSIS — E03.9 ACQUIRED HYPOTHYROIDISM: ICD-10-CM

## 2020-10-20 PROCEDURE — 99214 OFFICE O/P EST MOD 30 MIN: CPT | Performed by: INTERNAL MEDICINE

## 2020-10-20 RX ORDER — A/SINGAPORE/GP1908/2015 IVR-180 (AN A/MICHIGAN/45/2015 (H1N1)PDM09-LIKE VIRUS, A/HONG KONG/4801/2014, NYMC X-263B (H3N2) (AN A/HONG KONG/4801/2014-LIKE VIRUS), AND B/BRISBANE/60/2008, WILD TYPE (A B/BRISBANE/60/2008-LIKE VIRUS) 15; 15; 15 UG/.5ML; UG/.5ML; UG/.5ML
INJECTION, SUSPENSION INTRAMUSCULAR
COMMUNITY
Start: 2020-09-28 | End: 2022-08-09 | Stop reason: ALTCHOICE

## 2020-11-17 ENCOUNTER — OFFICE VISIT (OUTPATIENT)
Dept: BARIATRICS | Facility: CLINIC | Age: 80
End: 2020-11-17
Payer: MEDICARE

## 2020-11-17 VITALS
TEMPERATURE: 96.9 F | WEIGHT: 162.6 LBS | HEART RATE: 62 BPM | BODY MASS INDEX: 26.13 KG/M2 | DIASTOLIC BLOOD PRESSURE: 84 MMHG | SYSTOLIC BLOOD PRESSURE: 150 MMHG | HEIGHT: 66 IN

## 2020-11-17 DIAGNOSIS — E78.5 HYPERLIPIDEMIA: ICD-10-CM

## 2020-11-17 DIAGNOSIS — I35.1 MODERATE AORTIC REGURGITATION: ICD-10-CM

## 2020-11-17 DIAGNOSIS — E66.3 OVERWEIGHT: ICD-10-CM

## 2020-11-17 DIAGNOSIS — E03.9 ACQUIRED HYPOTHYROIDISM: ICD-10-CM

## 2020-11-17 DIAGNOSIS — I10 ESSENTIAL HYPERTENSION: Primary | ICD-10-CM

## 2020-11-17 DIAGNOSIS — I50.42 CHRONIC COMBINED SYSTOLIC AND DIASTOLIC HEART FAILURE (HCC): ICD-10-CM

## 2020-11-17 PROCEDURE — 99214 OFFICE O/P EST MOD 30 MIN: CPT | Performed by: INTERNAL MEDICINE

## 2020-12-08 ENCOUNTER — OFFICE VISIT (OUTPATIENT)
Dept: DERMATOLOGY | Facility: CLINIC | Age: 80
End: 2020-12-08
Payer: MEDICARE

## 2020-12-08 VITALS — TEMPERATURE: 97.5 F

## 2020-12-08 DIAGNOSIS — L57.0 LICHENOID KERATOSIS: Primary | ICD-10-CM

## 2020-12-08 DIAGNOSIS — L82.1 SEBORRHEIC KERATOSIS: ICD-10-CM

## 2020-12-08 DIAGNOSIS — Z85.828 HISTORY OF SKIN CANCER: ICD-10-CM

## 2020-12-08 DIAGNOSIS — Z13.89 SCREENING FOR SKIN CONDITION: ICD-10-CM

## 2020-12-08 PROCEDURE — 99213 OFFICE O/P EST LOW 20 MIN: CPT | Performed by: DERMATOLOGY

## 2020-12-23 ENCOUNTER — OFFICE VISIT (OUTPATIENT)
Dept: INTERNAL MEDICINE CLINIC | Facility: CLINIC | Age: 80
End: 2020-12-23
Payer: MEDICARE

## 2020-12-23 VITALS
BODY MASS INDEX: 26.2 KG/M2 | SYSTOLIC BLOOD PRESSURE: 140 MMHG | TEMPERATURE: 97.8 F | OXYGEN SATURATION: 98 % | DIASTOLIC BLOOD PRESSURE: 90 MMHG | WEIGHT: 163 LBS | HEART RATE: 83 BPM | HEIGHT: 66 IN

## 2020-12-23 DIAGNOSIS — I35.1 MODERATE AORTIC REGURGITATION: ICD-10-CM

## 2020-12-23 DIAGNOSIS — I51.89 COMBINED SYSTOLIC AND DIASTOLIC CARDIAC DYSFUNCTION: ICD-10-CM

## 2020-12-23 DIAGNOSIS — Z12.11 COLON CANCER SCREENING: ICD-10-CM

## 2020-12-23 DIAGNOSIS — I10 ESSENTIAL HYPERTENSION: Primary | ICD-10-CM

## 2020-12-23 DIAGNOSIS — I35.1 NONRHEUMATIC AORTIC VALVE INSUFFICIENCY: ICD-10-CM

## 2020-12-23 DIAGNOSIS — E03.9 ACQUIRED HYPOTHYROIDISM: ICD-10-CM

## 2020-12-23 PROCEDURE — G0438 PPPS, INITIAL VISIT: HCPCS | Performed by: INTERNAL MEDICINE

## 2020-12-23 PROCEDURE — 1123F ACP DISCUSS/DSCN MKR DOCD: CPT | Performed by: INTERNAL MEDICINE

## 2020-12-23 PROCEDURE — 99214 OFFICE O/P EST MOD 30 MIN: CPT | Performed by: INTERNAL MEDICINE

## 2020-12-30 ENCOUNTER — HOSPITAL ENCOUNTER (OUTPATIENT)
Dept: NON INVASIVE DIAGNOSTICS | Facility: CLINIC | Age: 80
Discharge: HOME | End: 2020-12-30
Payer: MEDICARE

## 2020-12-30 DIAGNOSIS — I35.1 NONRHEUMATIC AORTIC VALVE INSUFFICIENCY: ICD-10-CM

## 2020-12-30 PROCEDURE — 93306 TTE W/DOPPLER COMPLETE: CPT

## 2020-12-30 PROCEDURE — 93306 TTE W/DOPPLER COMPLETE: CPT | Performed by: INTERNAL MEDICINE

## 2021-01-05 ENCOUNTER — TELEPHONE (OUTPATIENT)
Dept: BARIATRICS | Facility: CLINIC | Age: 81
End: 2021-01-05

## 2021-01-05 NOTE — TELEPHONE ENCOUNTER
Pt called and asked that I let Dr Celestino Hodgson know that she will not be following with in office visits at this time  But will try her best to follow her  What Dr Celestino Hodgson told her to do  However she is having some health problems so she cannot come in for know  She wanted me to let Dr Celestino Hodgson know

## 2021-01-11 ENCOUNTER — TELEPHONE (OUTPATIENT)
Dept: INTERNAL MEDICINE CLINIC | Facility: CLINIC | Age: 81
End: 2021-01-11

## 2021-01-31 DIAGNOSIS — I10 HYPERTENSION, UNSPECIFIED TYPE: ICD-10-CM

## 2021-01-31 DIAGNOSIS — E78.5 HYPERLIPIDEMIA, UNSPECIFIED HYPERLIPIDEMIA TYPE: ICD-10-CM

## 2021-02-01 RX ORDER — ATORVASTATIN CALCIUM 20 MG/1
TABLET, FILM COATED ORAL
Qty: 90 TABLET | Refills: 3 | Status: SHIPPED | OUTPATIENT
Start: 2021-02-01 | End: 2022-03-21 | Stop reason: SDUPTHER

## 2021-02-01 RX ORDER — METOPROLOL SUCCINATE 25 MG/1
TABLET, EXTENDED RELEASE ORAL
Qty: 90 TABLET | Refills: 3 | Status: SHIPPED | OUTPATIENT
Start: 2021-02-01 | End: 2022-03-22

## 2021-02-01 RX ORDER — LISINOPRIL 10 MG/1
TABLET ORAL
Qty: 90 TABLET | Refills: 3 | Status: SHIPPED | OUTPATIENT
Start: 2021-02-01 | End: 2022-03-21 | Stop reason: SDUPTHER

## 2021-03-17 ENCOUNTER — IMMUNIZATIONS (OUTPATIENT)
Dept: FAMILY MEDICINE CLINIC | Facility: HOSPITAL | Age: 81
End: 2021-03-17

## 2021-03-17 DIAGNOSIS — Z23 ENCOUNTER FOR IMMUNIZATION: Primary | ICD-10-CM

## 2021-03-17 PROCEDURE — 91300 SARS-COV-2 / COVID-19 MRNA VACCINE (PFIZER-BIONTECH) 30 MCG: CPT

## 2021-03-17 PROCEDURE — 0001A SARS-COV-2 / COVID-19 MRNA VACCINE (PFIZER-BIONTECH) 30 MCG: CPT

## 2021-03-19 DIAGNOSIS — E03.9 HYPOTHYROIDISM, UNSPECIFIED TYPE: ICD-10-CM

## 2021-03-19 RX ORDER — LEVOTHYROXINE SODIUM 0.05 MG/1
TABLET ORAL
Qty: 90 TABLET | Refills: 3 | Status: SHIPPED | OUTPATIENT
Start: 2021-03-19 | End: 2022-03-17

## 2021-04-07 ENCOUNTER — IMMUNIZATIONS (OUTPATIENT)
Dept: FAMILY MEDICINE CLINIC | Facility: HOSPITAL | Age: 81
End: 2021-04-07

## 2021-04-07 DIAGNOSIS — Z23 ENCOUNTER FOR IMMUNIZATION: Primary | ICD-10-CM

## 2021-04-07 PROCEDURE — 91300 SARS-COV-2 / COVID-19 MRNA VACCINE (PFIZER-BIONTECH) 30 MCG: CPT

## 2021-04-07 PROCEDURE — 0002A SARS-COV-2 / COVID-19 MRNA VACCINE (PFIZER-BIONTECH) 30 MCG: CPT

## 2021-08-19 ENCOUNTER — OFFICE VISIT (OUTPATIENT)
Dept: DERMATOLOGY | Facility: CLINIC | Age: 81
End: 2021-08-19
Payer: COMMERCIAL

## 2021-08-19 DIAGNOSIS — L82.1 SEBORRHEIC KERATOSIS: ICD-10-CM

## 2021-08-19 DIAGNOSIS — L98.9 UNKNOWN SKIN LESION: Primary | ICD-10-CM

## 2021-08-19 DIAGNOSIS — Z13.89 SCREENING FOR SKIN CONDITION: ICD-10-CM

## 2021-08-19 DIAGNOSIS — Z85.828 HISTORY OF SKIN CANCER: ICD-10-CM

## 2021-08-19 PROCEDURE — 88305 TISSUE EXAM BY PATHOLOGIST: CPT | Performed by: STUDENT IN AN ORGANIZED HEALTH CARE EDUCATION/TRAINING PROGRAM

## 2021-08-19 PROCEDURE — 11102 TANGNTL BX SKIN SINGLE LES: CPT | Performed by: DERMATOLOGY

## 2021-08-19 PROCEDURE — 99213 OFFICE O/P EST LOW 20 MIN: CPT | Performed by: DERMATOLOGY

## 2021-08-19 PROCEDURE — 1036F TOBACCO NON-USER: CPT | Performed by: DERMATOLOGY

## 2021-08-19 PROCEDURE — 11103 TANGNTL BX SKIN EA SEP/ADDL: CPT | Performed by: DERMATOLOGY

## 2021-08-19 PROCEDURE — 1160F RVW MEDS BY RX/DR IN RCRD: CPT | Performed by: DERMATOLOGY

## 2021-08-19 NOTE — PATIENT INSTRUCTIONS
Lesion right thigh either a squamous cell carcinoma site to or a lichenoid keratosis await results of biopsy for before further treatment would be needed would be excision  Lesion on the chest wall probably an irritated keratosis versus verrucous keratosis await results further treatment needed  Seborrheic keratosis patient reassured these are normal growths we acquire with age no treatment needed  History of skin cancer in no recurrence nothing else atypical sunblock recommended follow-up in 6 months  Screening for dermatologic disorders nothing else of concern noted on complete exam follow-up in 6 months  Wound care instructions given to patient

## 2021-08-19 NOTE — PROGRESS NOTES
[facility] DERMATOLOGY  [address] [place] [id number]  [phone]  [phone]     MRN: [id number] : 1940  Encounter: [id number]  Patient Information: Lindsay Vidal  Chief complaint: 6 month check     History of present illness:  67-year-old female presents for overall skin check previous history nonmelanoma skin cancer again we noted lesion that we had discussed previously on right thigh that has not resolved also an itchy spot present on the mid chest no other concerns noted  Past Medical History:   Diagnosis Date    Basal cell carcinoma     H/O colonoscopy     History of nonmelanoma skin cancer     last assessed 17    Hyperlipidemia     Hypertension     Inflamed seborrheic keratosis     Overweight with body mass index (BMI) of 27 to 27 9 in adult     Sciatica     Uterine fibroid     Viral warts      Past Surgical History:   Procedure Laterality Date    MOHS SURGERY  2012    BCC L [place]    THYROID SURGERY      TOTAL ABDOMINAL HYSTERECTOMY       Social History   Social History     Substance and Sexual Activity   Alcohol Use Yes    Comment: Wine socially     Social History     Substance and Sexual Activity   Drug Use No     Social History     Tobacco Use   Smoking Status Never Smoker   Smokeless Tobacco Never Used     Family History   Problem Relation Age of Onset    Heart failure Mother     Arthritis Mother     Hypertension Mother     Kidney disease Sister     Cancer Son      Meds/Allergies   Allergies   Allergen Reactions    Aspirin        Meds:  Prior to Admission medications    Medication Sig Start Date End Date Taking?  Authorizing Provider   atorvastatin (LIPITOR) 20 mg tablet TAKE 1 TABLET BY MOUTH EVERY DAY 21  Yes Hieu Best MD   Calcium-Vitamin D-Vitamin K [id number] MG-UNT-MCG CHEW Chew   Yes Historical Provider, MD   Cranberry 1000 MG CAPS Take by mouth   Yes Historical Provider, MD   estradiol (ESTRACE) 0 1 mg/g vaginal cream  12/13/19  Yes Historical Provider, MD   Flaxseed, Linseed, (FLAXSEED OIL) 1000 MG CAPS Take 1 capsule by mouth daily 3/19/14  Yes Historical Provider, MD   levothyroxine 50 mcg tablet TAKE 1 TABLET BY MOUTH EVERY DAY 3/19/21  Yes Chano Huerta MD   lisinopril (ZESTRIL) 10 mg tablet TAKE 1 TABLET BY MOUTH EVERY DAY 2/1/21  Yes Chano Huerta MD   metoprolol succinate (TOPROL-XL) 25 mg 24 hr tablet TAKE 1 TABLET BY MOUTH EVERY DAY 2/1/21  Yes Chano Huerta MD   Multiple Vitamins-Minerals (MULTIVITAMIN GUMMIES ADULT) [facility] 16   Yes Historical Provider, MD   oxybutynin (DITROPAN-XL) 10 MG 24 hr tablet Take 10 mg by mouth daily 9/27/19  Yes Historical Provider, MD   Fluad Quadrivalent 0 5 ML R Sarment[address]  Patient not taking: Reported on 8/19/2021 9/28/20   Historical Provider, MD   terconazole (TERAZOL 7) 0 4 % vaginal cream Insert 1 Applicatorful into the vagina  Patient not taking: Reported on 8/19/2021 5/23/17   Historical Provider, MD       Subjective:     Review of Systems:    General: negative for - chills, fatigue, fever,  weight gain or weight loss  Psychological: negative for - anxiety, behavioral disorder, concentration difficulties, decreased libido, depression, irritability, memory difficulties, mood swings, sleep disturbances or suicidal ideation  ENT: negative for - hearing difficulties , nasal congestion, nasal discharge, oral lesions, sinus pain, sneezing, sore throat  Allergy and Immunology: negative for - hives, insect bite sensitivity,  Hematological and Lymphatic: negative for - bleeding problems, blood clots,bruising, swollen lymph nodes  Endocrine: negative for - hair pattern changes, hot flashes, malaise/lethargy, mood swings, palpitations, polydipsia/polyuria, skin changes, temperature intolerance or unexpected weight change  Respiratory: negative for - cough, hemoptysis, orthopnea, shortness of breath, or wheezing  Cardiovascular: negative for - chest pain, dyspnea on exertion, edema,  Gastrointestinal: negative for - abdominal pain, nausea/vomiting  Genito-Urinary: negative for - dysuria, incontinence, irregular/heavy menses or urinary frequency/urgency  Musculoskeletal: negative for - gait disturbance, joint pain, joint stiffness, joint swelling, muscle pain, muscular weakness  Dermatological:  As in HPI  Neurological: negative for confusion, dizziness, headaches, impaired coordination/balance, memory loss, numbness/tingling, seizures, speech problems, tremors or weakness       Objective:   LMP  (LMP Unknown)     Physical Exam:    General Appearance:    Alert, cooperative, no distress   Head:    Normocephalic, without obvious abnormality, atraumatic           Skin:   A full skin exam was performed including scalp, head scalp, eyes, ears, nose, lips, neck, chest, axilla, abdomen, back, buttocks, bilateral upper extremities, bilateral lower extremities, hands, feet, fingers, toes, fingernails, and toenails 15 mm pink macule noted on the right thigh verrucous keratotic 6 mm papule noted on the mid chest normal keratotic papules greasy stuck on appearance previous sites skin cancer well healed without recurrence nothing else remarkable noted on exam          Shave Biopsy Procedure Note    Pre-operative Diagnosis:  Verrucous keratosis rule out squamous cell carcinoma in situ    Plan:  1  Instructed to keep the wound dry and covered for 24 and clean thereafter  2  Warning signs of infection were reviewed  3  Recommended that the patient use OTC acetaminophen as needed for pain  4  Return  Pending results of biopsy(ies)    Locations:  Mid chest and right thigh    Indications:  Suspicious lesions    Anesthesia: Lidocaine 1% with epinephrine without added sodium bicarbonate    Procedure Details     Patient informed of the risks (including bleeding and infection) and benefits of the   procedure and Verbal informed consent obtained      The lesion and surrounding area were given a sterile prep using alcohol and draped in the usual sterile fashion  A Blue blade razor was used to obtain a specimen  Hemostasis achieved with aluminum chloride  Petrolatum and a sterile dressing applied  The specimen was sent for pathologic examination  The patient tolerated the procedure(s) well  Complications:  none  Assessment:     1  Unknown skin lesion     2  Seborrheic keratosis     3  Screening for skin condition     4  History of skin cancer           Plan:   Lesion right thigh either a squamous cell carcinoma site to or a lichenoid keratosis await results of biopsy for before further treatment would be needed would be excision  Lesion on the chest wall probably an irritated keratosis versus verrucous keratosis await results further treatment needed  Seborrheic keratosis patient reassured these are normal growths we acquire with age no treatment needed  History of skin cancer in no recurrence nothing else atypical sunblock recommended follow-up in 6 months  Screening for dermatologic disorders nothing else of concern noted on complete exam follow-up in 6 months    Augie Cavazos MD  8/19/2021,9:59 AM    Portions of the record may have been created with voice recognition software   Occasional wrong word or "sound a like" substitutions may have occurred due to the inherent limitations of voice recognition software   Read the chart carefully and recognize, using context, where substitutions have occurred

## 2021-09-09 ENCOUNTER — PROCEDURE VISIT (OUTPATIENT)
Dept: DERMATOLOGY | Facility: CLINIC | Age: 81
End: 2021-09-09
Payer: COMMERCIAL

## 2021-09-09 DIAGNOSIS — C44.529 SQUAMOUS CELL CARCINOMA OF SKIN OF CHEST: Primary | ICD-10-CM

## 2021-09-09 DIAGNOSIS — D04.71 SQUAMOUS CELL CARCINOMA IN SITU (SCCIS) OF SKIN OF RIGHT THIGH: ICD-10-CM

## 2021-09-09 PROCEDURE — 88305 TISSUE EXAM BY PATHOLOGIST: CPT | Performed by: PATHOLOGY

## 2021-09-09 PROCEDURE — 17262 DSTRJ MAL LES T/A/L 1.1-2.0: CPT | Performed by: DERMATOLOGY

## 2021-09-09 PROCEDURE — 12032 INTMD RPR S/A/T/EXT 2.6-7.5: CPT | Performed by: DERMATOLOGY

## 2021-09-09 PROCEDURE — 11602 EXC TR-EXT MAL+MARG 1.1-2 CM: CPT | Performed by: DERMATOLOGY

## 2021-09-09 NOTE — PROGRESS NOTES
[facility] DERMATOLOGY  [address] [place] [address] Bhavani Espitia [address]  [phone]  [phone]     MRN: [id number] : 1940  Encounter: [id number]  Patient Information: Cholo Littlejohn    Subjective:     78year old female presents for planned removal of previously biopsied squamous cell carcinoma mid chest and curettage of squamous cell carcinoma situ right thigh  The lesion on the chest concern was raised that was more atypical and a small excision was recommended     Objective:   LMP  (LMP Unknown)     Physical Exam:    General Appearance:    Alert, cooperative, no distress   Skin:   Previous sites of biopsy noted    Procedure name: Excision with intermediate layered closure        Location:  Mid chest    Diagnosis: Squamous cell carcinoma    Size of lesion: 4 mm    Margins: 4 mm    Size + Margins: 12 mm    I explained the diagnosis to the patient and we recommend an excision of the lesion for diagnosis and/or treatment  Potential complications include, but are not limited to: scarring, bleeding, infection, incomplete removal, recurrence, and nerve damage  The risks, benefits, and alternatives were discussed with the patient in detail  The location of the tumor was identified, circled, and confirmed by the patient  The correct patient, site, and procedure were confirmed  Anesthesia: 1% xylocaine with 1:100,000 epinephrine 4 cc  The patient was brought into the room, prepped and draped sterilely in the usual manner and anesthesia was administered by local infiltration  A fusiform shape was drawn around the lesion, and the margins were incised to the level of the subcutaneous fat with a number 15cc Bard-Shan blade  The tissue was removed with sharp and blunt dissection  The lateral margins of the resulting defect were undermined with sharp and blunt dissection  Hemostasis was achieved with electrocautery    The deeper layers of the defect, including subcutaneous fat and fascia, had to be approximated to reduce tension on the suture line  Layered wound closure was performed  The wound was cleaned with saline, dried off, petroleum applied, and the wound was covered with a pressure dressing  The patient was given detailed verbal and written instructions on postoperative care  Suture for adipose/fascial/dermal layer closure: 4-0  vicryl 2 sutures interrupted    Suture used to approximate epidermis: 4-0  nylon 6sutures interrupted    Final wound length: 3 8 cm    Follow-up in: 2 weeks  Patient tolerated procedure well without complications  Procedure: Curettage & Electrodessication squamous cell carcinoma in situ  The reasons for the procedure were explained to the patient  The benefits and risks of the procedure were explained to the patient, including bleeding, infection, incomplete removal, prolonged anesthesia (weeks to months) and rarely nerve damage  The patient is aware that a scar will result from the procedure  The consent for the procedure was obtained verbally and in writing  Lesion Site:  Right thigh Curetted Area Size (mm): 15    After alcohol prep 1% lidocaine with epinephrine anesthesia  Using a sterile curette, the appropriate area was curetted  The area was curetted and electrodesiccated x3  Final defect size: 17mm    The wound was left to heal by secondary intention  The wound was cleansed then covered with a dressing  Wound care instructions were verbally given and in writing  I performed the entire procedure  Patient tolerated procedure well  Assessment:     1  Squamous cell carcinoma of skin of chest     2   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 Authorizing Provider   atorvastatin (LIPITOR) 20 mg tablet TAKE 1 TABLET BY MOUTH EVERY DAY 2/1/21   Lindsay Felder MD   Calcium-Vitamin D-Vitamin K [phone] MG-UNT-MCG [address] [address]    Historical Provider, MD   Cranberry 1000 MG CAPS Take by mouth    Historical Provider, MD   estradiol (ESTRACE) 0 1 mg/g vaginal cream  12/13/19   Historical Provider, MD   Flaxseed, Linseed, (FLAXSEED OIL) 1000 MG CAPS Take 1 capsule by mouth daily 3/19/14   Historical Provider, MD   Fluad Quadrivalent 0 5 ML R Sarmento [address]  Patient not taking: Reported on 8/19/2021 9/28/20   Historical Provider, MD   levothyroxine 50 mcg tablet TAKE 1 TABLET BY MOUTH EVERY DAY 3/19/21   Lindsay Felder MD   lisinopril (ZESTRIL) 10 mg tablet TAKE 1 TABLET BY MOUTH EVERY DAY 2/1/21   Lindsay Felder MD   metoprolol succinate (TOPROL-XL) 25 mg 24 hr tablet TAKE 1 TABLET BY MOUTH EVERY DAY 2/1/21   Lindsay Felder MD   Multiple Vitamins-Minerals (MULTIVITAMIN GUMMIES ADULT) [facility] [address]    Historical Provider, MD   oxybutynin (DITROPAN-XL) 10 MG 24 hr tablet Take 10 mg by mouth daily 9/27/19   Historical Provider, MD   terconazole (TERAZOL 7) 0 4 % vaginal cream Insert 1 Applicatorful into the vagina  Patient not taking: Reported on 8/19/2021 5/23/17   Historical Provider, MD     Allergies   Allergen Reactions    Aspirin        Lisbeth Duque MD  9/9/2021,2:31 PM    Portions of the record may have been created with voice recognition software   Occasional wrong word or "sound a like" substitutions may have occurred due to the inherent limitations of voice recognition software   Read the chart carefully and recognize, using context, where substitutions have occurred

## 2021-09-09 NOTE — PATIENT INSTRUCTIONS
Lesion on the chest excised for recommendation pathology  Lesion on the right thigh will see if we can remove this with curettage will watch closely for recurrence  Wound care instructions given to patient

## 2021-09-15 ENCOUNTER — TELEPHONE (OUTPATIENT)
Dept: DERMATOLOGY | Facility: CLINIC | Age: 81
End: 2021-09-15

## 2021-09-15 NOTE — TELEPHONE ENCOUNTER
----- Message from Jennifer Solis MD sent at 9/15/2021  2:29 PM EDT -----  Call patient if not coming in shortly for suture removal to let him know that the margins were clear

## 2021-09-23 ENCOUNTER — CLINICAL SUPPORT (OUTPATIENT)
Dept: DERMATOLOGY | Facility: CLINIC | Age: 81
End: 2021-09-23

## 2021-09-23 DIAGNOSIS — C44.529 SQUAMOUS CELL CARCINOMA OF SKIN OF CHEST: Primary | ICD-10-CM

## 2021-09-23 DIAGNOSIS — D04.71 SQUAMOUS CELL CARCINOMA IN SITU (SCCIS) OF SKIN OF RIGHT THIGH: ICD-10-CM

## 2021-09-23 PROCEDURE — RECHECK: Performed by: DERMATOLOGY

## 2021-09-23 NOTE — PROGRESS NOTES
[address]  [address] [facility] [address]  [phone]  [phone]     MRN: [id number] : 1940  Encounter: [id number]  Patient Information: Ladan Schmidt    Subjective:     64-year-old female presents for suture removal from previous excised squamous cell carcinoma mid chest also curetted squamous cell carcinoma in-situ right thigh     Objective:   LMP  (LMP Unknown)     Physical Exam:    General Appearance:    Alert, cooperative, no distress   Skin:   Previous site of excision healing well erosions still noted on the right thigh  Procedure:  Suture removal  Site of excision:  Mid chest  Wound was cleansed with saline  Wound is intact  Sutures removed  Steri-Strips applied  Biopsy revealed no further squamous cell carcinoma     Assessment:     1  Squamous cell carcinoma of skin of chest     2  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  Authorizing Provider   atorvastatin (LIPITOR) 20 mg tablet TAKE 1 TABLET BY MOUTH EVERY DAY 21  Yes Argelia Batres MD   Calcium-Vitamin D-Vitamin K [phone] MG-UNT-MCG CHEW Chew   Yes Historical Provider, MD   Cranberry 1000 MG CAPS Take by mouth   Yes Historical Provider, MD   estradiol (ESTRACE) 0 1 mg/g vaginal cream  19  Yes Historical Provider, MD   Flaxsemaricruz, Linseed, (FLAXSEED OIL) 1000 MG CAPS Take 1 capsule by mouth daily 3/19/14  Yes Historical Provider, MD   Fluad Quadrivalent 0 5 ML R Sarment[address] 20  Yes Historical Provider, MD   levothyroxine 50 mcg tablet TAKE 1 TABLET BY MOUTH EVERY DAY 3/19/21  Yes Argelia Batres MD   lisinopril (ZESTRIL) 10 mg tablet TAKE 1 TABLET BY MOUTH EVERY DAY 21  Yes Argelia Batres MD   metoprolol succinate (TOPROL-XL) 25 mg 24 hr tablet TAKE 1 TABLET BY MOUTH EVERY DAY 21 Yes Addy Viveros MD   Multiple Vitamins-Minerals (MULTIVITAMIN GUMMIES ADULT) [facility] 16   Yes Historical Provider, MD   oxybutynin (DITROPAN-XL) 10 MG 24 hr tablet Take 10 mg by mouth daily 9/27/19  Yes Historical Provider, MD   terconazole (TERAZOL 7) 0 4 % vaginal cream Insert 1 Applicatorful into the vagina  5/23/17  Yes Historical Provider, MD     Allergies   Allergen Reactions    Aspirin        Brandyn Chin MD  0/93/6007,0:18 PM    Portions of the record may have been created with voice recognition software   Occasional wrong word or "sound a like" substitutions may have occurred due to the inherent limitations of voice recognition software   Read the chart carefully and recognize, using context, where substitutions have occurred

## 2021-10-26 ENCOUNTER — OFFICE VISIT (OUTPATIENT)
Dept: DERMATOLOGY | Facility: CLINIC | Age: 81
End: 2021-10-26

## 2021-10-26 DIAGNOSIS — D04.71 SQUAMOUS CELL CARCINOMA IN SITU (SCCIS) OF SKIN OF RIGHT THIGH: Primary | ICD-10-CM

## 2021-10-26 PROCEDURE — RECHECK: Performed by: DERMATOLOGY

## 2022-01-11 ENCOUNTER — IMMUNIZATIONS (OUTPATIENT)
Dept: FAMILY MEDICINE CLINIC | Facility: HOSPITAL | Age: 82
End: 2022-01-11

## 2022-01-11 DIAGNOSIS — Z23 ENCOUNTER FOR IMMUNIZATION: Primary | ICD-10-CM

## 2022-01-11 PROCEDURE — 91300 COVID-19 PFIZER VACC 0.3 ML: CPT

## 2022-01-11 PROCEDURE — 0001A COVID-19 PFIZER VACC 0.3 ML: CPT

## 2022-03-03 ENCOUNTER — OFFICE VISIT (OUTPATIENT)
Dept: DERMATOLOGY | Facility: CLINIC | Age: 82
End: 2022-03-03
Payer: COMMERCIAL

## 2022-03-03 VITALS — WEIGHT: 167 LBS | BODY MASS INDEX: 27.37 KG/M2

## 2022-03-03 DIAGNOSIS — Z85.828 HISTORY OF SKIN CANCER: ICD-10-CM

## 2022-03-03 DIAGNOSIS — L82.1 SEBORRHEIC KERATOSIS: Primary | ICD-10-CM

## 2022-03-03 DIAGNOSIS — Z13.89 SCREENING FOR SKIN CONDITION: ICD-10-CM

## 2022-03-03 PROCEDURE — 99213 OFFICE O/P EST LOW 20 MIN: CPT | Performed by: DERMATOLOGY

## 2022-03-03 NOTE — PATIENT INSTRUCTIONS
Seborrheic keratosis patient reassured these are normal growths we acquire with age no treatment needed  History of skin cancer in no recurrence nothing else atypical sunblock recommended follow-up in 6 months if lesion on the right thigh still causes discomfort at next visit we can consider intralesional steroid injection  Screening for dermatologic disorders nothing else of concern noted on complete exam follow-up in 6 months

## 2022-03-03 NOTE — PROGRESS NOTES
[facility] DERMATOLOGY  [address] [place] [id number]  [phone]  [phone]     MRN: [id number] : 1940  Encounter: [id number]  Patient Information: Danilo Torrez  Chief complaint:   6 Month checkup    History of present illness:  76-year-old female with previous history of skin cancer presents for overall checkup no specific concerns except for discomfort from her lesion on the right thigh which we had previously treated  Past Medical History:   Diagnosis Date    Basal cell carcinoma     H/O colonoscopy     History of nonmelanoma skin cancer     last assessed 17    Hyperlipidemia     Hypertension     Inflamed seborrheic keratosis     Overweight with body mass index (BMI) of 27 to 27 9 in adult     Sciatica     Uterine fibroid     Viral warts      Past Surgical History:   Procedure Laterality Date    MOHS SURGERY  2012    [facility] [place]    THYROID SURGERY      TOTAL ABDOMINAL HYSTERECTOMY       Social History   Social History     Substance and Sexual Activity   Alcohol Use Yes    Comment: Wine socially     Social History     Substance and Sexual Activity   Drug Use No     Social History     Tobacco Use   Smoking Status Never Smoker   Smokeless Tobacco Never Used     Family History   Problem Relation Age of Onset    Heart failure Mother     Arthritis Mother     Hypertension Mother     Kidney disease Sister     Cancer Son      Meds/Allergies   Allergies   Allergen Reactions    Aspirin        Meds:  Prior to Admission medications    Medication Sig Start Date End Date Taking?  Authorizing Provider   atorvastatin (LIPITOR) 20 mg tablet TAKE 1 TABLET BY MOUTH EVERY DAY 21  Yes Adriano Herrera MD   Calcium-Vitamin D-Vitamin K 589-626-39 MG-UNT-MCG CHEW Chew   Yes Historical Provider, MD   Cranberry 1000 MG CAPS Take by mouth   Yes Historical Provider, MD   estradiol (ESTRACE) 0 1 mg/g vaginal cream  19  Yes Historical Provider, MD   Flaxseed, Linseed, (FLAXSEED OIL) 1000 MG CAPS Take 1 capsule by mouth daily 3/19/14  Yes Historical Provider, MD   levothyroxine 50 mcg tablet TAKE 1 TABLET BY MOUTH EVERY DAY 3/19/21  Yes Juanjose Rodarte MD   lisinopril (ZESTRIL) 10 mg tablet TAKE 1 TABLET BY MOUTH EVERY DAY 2/1/21  Yes Juanjose Rodarte MD   metoprolol succinate (TOPROL-XL) 25 mg 24 hr tablet TAKE 1 TABLET BY MOUTH EVERY DAY 2/1/21  Yes Juanjose Rodarte MD   Multiple Vitamins-Minerals (MULTIVITAMIN GUMMIES ADULT) [facility] 16   Yes Historical Provider, MD   oxybutynin (DITROPAN-XL) 10 MG 24 hr tablet Take 10 mg by mouth daily 9/27/19  Yes Historical Provider, MD   terconazole (TERAZOL 7) 0 4 % vaginal cream Insert 1 Applicatorful into the vagina  5/23/17  Yes Historical Provider, MD   Fluad Quadrivalent 0 5 ML R [address]  Patient not taking: Reported on 10/26/2021 9/28/20   Historical Provider, MD       Subjective:     Review of Systems:    General: negative for - chills, fatigue, fever,  weight gain or weight loss  Psychological: negative for - anxiety, behavioral disorder, concentration difficulties, decreased libido, depression, irritability, memory difficulties, mood swings, sleep disturbances or suicidal ideation  ENT: negative for - hearing difficulties , nasal congestion, nasal discharge, oral lesions, sinus pain, sneezing, sore throat  Allergy and Immunology: negative for - hives, insect bite sensitivity,  Hematological and Lymphatic: negative for - bleeding problems, blood clots,bruising, swollen lymph nodes  Endocrine: negative for - hair pattern changes, hot flashes, malaise/lethargy, mood swings, palpitations, polydipsia/polyuria, skin changes, temperature intolerance or unexpected weight change  Respiratory: negative for - cough, hemoptysis, orthopnea, shortness of breath, or wheezing  Cardiovascular: negative for - chest pain, dyspnea on exertion, edema,  Gastrointestinal: negative for - abdominal pain, nausea/vomiting  Genito-Urinary: negative for - dysuria, incontinence, irregular/heavy menses or urinary frequency/urgency  Musculoskeletal: negative for - gait disturbance, joint pain, joint stiffness, joint swelling, muscle pain, muscular weakness  Dermatological:  As in HPI  Neurological: negative for confusion, dizziness, headaches, impaired coordination/balance, memory loss, numbness/tingling, seizures, speech problems, tremors or weakness       Objective: Wt 75 8 kg (167 lb)   LMP  (LMP Unknown)   BMI 27 37 kg/m²     Physical Exam:    General Appearance:    Alert, cooperative, no distress   Head:    Normocephalic, without obvious abnormality, atraumatic           Skin:   A full skin exam was performed including scalp, head scalp, eyes, ears, nose, lips, neck, chest, axilla, abdomen, back, buttocks, bilateral upper extremities, bilateral lower extremities, hands, feet, fingers, toes, fingernails, and toenails indurated scar noted on the right thigh keratotic papules with greasy stuck on appearance previous site of skin cancers well healed without recurrence nothing else atypical noted on exam     Assessment:     1  Seborrheic keratosis     2  Screening for skin condition     3   History of skin cancer           Plan:   Seborrheic keratosis patient reassured these are normal growths we acquire with age no treatment needed  History of skin cancer in no recurrence nothing else atypical sunblock recommended follow-up in 6 months if lesion on the right thigh still causes discomfort at next visit we can consider intralesional steroid injection  Screening for dermatologic disorders nothing else of concern noted on complete exam follow-up in 6 months    Glenis Fuentes MD  3/3/2022,10:31 AM    Portions of the record may have been created with voice recognition software   Occasional wrong word or "sound a like" substitutions may have occurred due to the inherent limitations of voice recognition software   Read the chart carefully and recognize, using context, where substitutions have occurred

## 2022-03-11 ENCOUNTER — RA CDI HCC (OUTPATIENT)
Dept: OTHER | Facility: HOSPITAL | Age: 82
End: 2022-03-11

## 2022-03-11 NOTE — PROGRESS NOTES
[facility]  coding opportunities     DX: I11 0        Chart Reviewed * (Number of) Inbasket suggestions sent to Provider: 1                  Patients insurance company: [employer]

## 2022-03-18 ENCOUNTER — OFFICE VISIT (OUTPATIENT)
Dept: INTERNAL MEDICINE CLINIC | Facility: CLINIC | Age: 82
End: 2022-03-18
Payer: COMMERCIAL

## 2022-03-18 VITALS
HEART RATE: 88 BPM | TEMPERATURE: 98.9 F | OXYGEN SATURATION: 97 % | SYSTOLIC BLOOD PRESSURE: 140 MMHG | WEIGHT: 169 LBS | DIASTOLIC BLOOD PRESSURE: 82 MMHG | HEIGHT: 65 IN | BODY MASS INDEX: 28.16 KG/M2

## 2022-03-18 DIAGNOSIS — I50.32 CHRONIC DIASTOLIC HEART FAILURE (HCC): Primary | ICD-10-CM

## 2022-03-18 PROCEDURE — 3288F FALL RISK ASSESSMENT DOCD: CPT | Performed by: INTERNAL MEDICINE

## 2022-03-18 PROCEDURE — 1125F AMNT PAIN NOTED PAIN PRSNT: CPT | Performed by: INTERNAL MEDICINE

## 2022-03-18 PROCEDURE — 1036F TOBACCO NON-USER: CPT | Performed by: INTERNAL MEDICINE

## 2022-03-18 PROCEDURE — 1101F PT FALLS ASSESS-DOCD LE1/YR: CPT | Performed by: INTERNAL MEDICINE

## 2022-03-18 PROCEDURE — 1170F FXNL STATUS ASSESSED: CPT | Performed by: INTERNAL MEDICINE

## 2022-03-18 PROCEDURE — 1160F RVW MEDS BY RX/DR IN RCRD: CPT | Performed by: INTERNAL MEDICINE

## 2022-03-18 PROCEDURE — G0439 PPPS, SUBSEQ VISIT: HCPCS | Performed by: INTERNAL MEDICINE

## 2022-03-18 PROCEDURE — 3725F SCREEN DEPRESSION PERFORMED: CPT | Performed by: INTERNAL MEDICINE

## 2022-03-18 PROCEDURE — 99213 OFFICE O/P EST LOW 20 MIN: CPT | Performed by: INTERNAL MEDICINE

## 2022-03-18 NOTE — PROGRESS NOTES
Assessment/Plan:       Diagnoses and all orders for this visit:    Chronic diastolic heart failure ([address] )    Other orders  -     Cancel: Mammo screening bilateral w 3d & cad; Future                Subjective:      Patient ID: Marlo Burgess is a 78 y o  female  She is here for medical wellness  She feels well she has no complaints ambulatory active hypertension is stress incontinence controlled with meds hypothyroid controlled with meds recurrent skin cancers which have been followed by Dermatology      Moderate aortic regurgitation noted on prior echocardiography  The last echocardiogram was done December 2020 in documents continued moderate aortic regurgitation but a prior systolic dysfunction was improved up to 55% with continued grade 1 diastolic dysfunction so this is actually better  No heart failure symptoms     She has aged out of colonoscopy  Negative Cologuard January 2021  Sees gynecology     Mammograms are done  I the last was done through [facility] in May of 2021 and is documented  Only complaints arthropathic   Vaccinations are up-to-date  The following portions of the patient's history were reviewed and updated as appropriate:   She has a past medical history of Basal cell carcinoma, H/O colonoscopy, History of nonmelanoma skin cancer, Hyperlipidemia, Hypertension, Inflamed seborrheic keratosis, Overweight with body mass index (BMI) of 27 to 27 9 in adult, Sciatica, Uterine fibroid, and Viral warts  ,  does not have any pertinent problems on file  ,   has a past surgical history that includes Mohs surgery (11/08/2012); Thyroid surgery; and Total abdominal hysterectomy  ,  family history includes Arthritis in her mother; Cancer in her son; Heart failure in her mother; Hypertension in her mother; Kidney disease in her sister  ,   reports that she has never smoked  She has never used smokeless tobacco  She reports current alcohol use of about 1 0 standard drink of alcohol per week   She reports that she does not use drugs  ,  is allergic to aspirin     Current Outpatient Medications   Medication Sig Dispense Refill    atorvastatin (LIPITOR) 20 mg tablet TAKE 1 TABLET BY MOUTH EVERY DAY 90 tablet 3    Calcium-Vitamin D-Vitamin K 500-100-40 MG-UNT-MCG CHEW Chew      Cranberry 1000 MG CAPS Take by mouth      estradiol (ESTRACE) 0 1 mg/g vaginal cream       Flaxseed, Linseed, (FLAXSEED OIL) 1000 MG CAPS Take 1 capsule by mouth daily      Fluad Quadrivalent 0 5 ML [facility] ADMINISTERED      levothyroxine 50 mcg tablet Take 1 tablet (50 mcg total) by mouth daily 30 tablet 0    lisinopril (ZESTRIL) 10 mg tablet TAKE 1 TABLET BY MOUTH EVERY DAY 90 tablet 3    metoprolol succinate (TOPROL-XL) 25 mg 24 hr tablet TAKE 1 TABLET BY MOUTH EVERY DAY 90 tablet 3    Multiple Vitamins-Minerals (MULTIVITAMIN GUMMIES ADULT) CHEW Chew      oxybutynin (DITROPAN-XL) 10 MG 24 hr tablet Take 10 mg by mouth daily  2     No current facility-administered medications for this visit  Review of Systems   Musculoskeletal: Positive for arthralgias  All other systems reviewed and are negative  Objective:  Vitals:    03/18/22 1502   BP: 140/82   Pulse: 88   Temp: 98 9 °F (37 2 °C)   SpO2: 97%      Physical Exam  Constitutional:       Appearance: Normal appearance  Comments: Moderately overweight female patient who appears to be the stated age but who nevertheless looks well   Cardiovascular:      Rate and Rhythm: Normal rate  Heart sounds: Murmur heard  Pulmonary:      Effort: Pulmonary effort is normal       Breath sounds: Normal breath sounds  Neurological:      Mental Status: She is alert  Psychiatric:         Mood and Affect: Mood normal          Judgment: Judgment normal            Patient Instructions   Functional 78year-old with modest diagnoses   Yearly follow-up

## 2022-03-18 NOTE — PROGRESS NOTES
Assessment and Plan:     Problem List Items Addressed This Visit     None      Visit Diagnoses     Encounter for immunization        Encounter for screening mammogram for breast cancer            BMI Counseling: Body mass index is 28 12 kg/m²  Follow-up plan was not completed due to elderly patient (70 years old) where weight reduction/weight gain would complicate underlying health condition such as: illness or physical disability  Depression Screening and Follow-up Plan: Patient was screened for depression during today's encounter  They screened negative with a PHQ-2 score of 0  Preventive health issues were discussed with patient, and age appropriate screening tests were ordered as noted in patient's After Visit Summary  Personalized health advice and appropriate referrals for health education or preventive services given if needed, as noted in patient's After Visit Summary       History of Present Illness:     Patient presents for Medicare Annual Wellness visit    Patient Care Team:  Nilesh Osuna MD as PCP - Julisa Carlos MD as PCP - PCP-Marisela (RTE)  Lizz Suazo MD     Problem List:     Patient Active Problem List   Diagnosis    Combined systolic and diastolic cardiac dysfunction    Stress incontinence of urine    Chest wall pain    Acquired hypothyroidism    Seborrheic keratosis    History of skin cancer    Moderate aortic regurgitation    Atrophic vaginitis    Overweight    Hyperlipidemia    Hypertension    Colon cancer screening      Past Medical and Surgical History:     Past Medical History:   Diagnosis Date    Basal cell carcinoma     H/O colonoscopy     History of nonmelanoma skin cancer     last assessed 11/20/17    Hyperlipidemia     Hypertension     Inflamed seborrheic keratosis     Overweight with body mass index (BMI) of 27 to 27 9 in adult     Sciatica     Uterine fibroid     Viral warts      Past Surgical History:   Procedure Laterality Date    [facility]    THYROID SURGERY      TOTAL ABDOMINAL HYSTERECTOMY        Family History:     Family History   Problem Relation Age of Onset    Heart failure Mother     Arthritis Mother     Hypertension Mother     Kidney disease Sister     Cancer Son       Social History:     Social History     Socioeconomic History    Marital status:      Spouse name: None    Number of children: None    Years of education: None    Highest education level: None   Occupational History    None   Tobacco Use    Smoking status: Never Smoker    Smokeless tobacco: Never Used   Vaping Use    Vaping Use: Never used   Substance and Sexual Activity    Alcohol use:  Yes     Alcohol/week: 1 0 standard drink     Types: 1 Cans of beer per week     Comment: Wine socially    Drug use: No    Sexual activity: Never   Other Topics Concern    None   Social History Narrative    Active advance directive    Drinks coffee    Living independently alone     Social Determinants of Health     Financial Resource Strain: Not on file   Food Insecurity: Not on file   Transportation Needs: Not on file   Physical Activity: Not on file   Stress: Not on file   Social Connections: Not on file   Intimate Partner Violence: Not on file   Housing Stability: Not on file      Medications and Allergies:     Current Outpatient Medications   Medication Sig Dispense Refill    atorvastatin (LIPITOR) 20 mg tablet TAKE 1 TABLET BY MOUTH EVERY DAY 90 tablet 3    Calcium-Vitamin D-Vitamin K 500-100-40 MG-UNT-MCG CHEW Chew      Cranberry 1000 MG CAPS Take by mouth      estradiol (ESTRACE) 0 1 mg/g vaginal cream       Flaxseed, Linseed, (FLAXSEED OIL) 1000 MG CAPS Take 1 capsule by mouth daily      Fluad Quadrivalent 0 5 ML [facility] ADMINISTERED      levothyroxine 50 mcg tablet Take 1 tablet (50 mcg total) by mouth daily 30 tablet 0    lisinopril (ZESTRIL) 10 mg tablet TAKE 1 TABLET BY MOUTH EVERY DAY 90 tablet 3  metoprolol succinate (TOPROL-XL) 25 mg 24 hr tablet TAKE 1 TABLET BY MOUTH EVERY DAY 90 tablet 3    Multiple Vitamins-Minerals (MULTIVITAMIN GUMMIES ADULT) CHEW Chew      oxybutynin (DITROPAN-XL) 10 MG 24 hr tablet Take 10 mg by mouth daily  2    terconazole (TERAZOL 7) 0 4 % vaginal cream Insert 1 Applicatorful into the vagina  (Patient not taking: Reported on 3/18/2022 )       No current facility-administered medications for this visit  Allergies   Allergen Reactions    Aspirin       Immunizations:     Immunization History   Administered Date(s) Administered    COVID-19 PFIZER VACCINE 0 3 ML IM 03/17/2021, 04/07/2021, 01/11/2022    INFLUENZA 09/04/2014, 10/29/2015, 11/09/2016, 10/04/2017, 11/07/2018, 11/10/2021    Influenza Split High Dose Preservative Free IM 10/29/2015, 11/09/2016, 10/04/2017    Influenza, high dose seasonal 0 7 mL 11/07/2018, 11/11/2019    Influenza, seasonal, injectable 10/15/2013    Pneumococcal Conjugate 13-Valent 11/09/2016    Pneumococcal Polysaccharide PPV23 08/09/2012    Tdap 10/19/2005    Zoster 01/28/2013    Zoster Vaccine Recombinant 10/05/2021, 12/07/2021      Health Maintenance:         Topic Date Due    Breast Cancer Screening: Mammogram  10/03/2020         Topic Date Due    DTaP,Tdap,and Td Vaccines (2 - Td or Tdap) 10/19/2015      Medicare Health Risk Assessment:     /82 (BP Location: Left arm, Patient Position: Sitting, Cuff Size: Standard) Comment: bp  Pulse 88   Temp 98 9 °F (37 2 °C) (Temporal) Comment: no  Ht 5' 5" (1 651 m)   Wt 76 7 kg (169 lb)   LMP  (LMP Unknown)   SpO2 97%   BMI 28 12 kg/m²      Dallas Ward is here for her Subsequent Wellness visit  Last Medicare Wellness visit information reviewed, patient interviewed and updates made to the record today  Health Risk Assessment:   Patient rates overall health as good  Patient feels that their physical health rating is same  Patient is satisfied with their life   Eyesight was rated as same  Hearing was rated as same  Patient feels that their emotional and mental health rating is same  Patients states they are sometimes angry  Patient states they are sometimes unusually tired/fatigued  Pain experienced in the last 7 days has been none  Patient states that she has experienced no weight loss or gain in last 6 months  Depression Screening:   PHQ-2 Score: 0      Fall Risk Screening: In the past year, patient has experienced: history of falling in past year    Number of falls: 1  Injured during fall?: No    Feels unsteady when standing or walking?: No    Worried about falling?: No      Urinary Incontinence Screening:   Patient has leaked urine accidently in the last six months  Home Safety:  Patient does not have trouble with stairs inside or outside of their home  Patient has working smoke alarms and has no working carbon monoxide detector  Home safety hazards include: none  Nutrition:   Current diet is Regular and No Added Salt  Medications:   Patient is currently taking over-the-counter supplements  OTC medications include: see medication list  Patient is able to manage medications  Activities of Daily Living (ADLs)/Instrumental Activities of Daily Living (IADLs):   Walk and transfer into and out of bed and chair?: Yes  Dress and groom yourself?: Yes    Bathe or shower yourself?: Yes    Feed yourself? Yes  Do your laundry/housekeeping?: Yes  Manage your money, pay your bills and track your expenses?: Yes  Make your own meals?: Yes    Do your own shopping?: Yes    Previous Hospitalizations:   Any hospitalizations or ED visits within the last 12 months?: No      Advance Care Planning:   Living will: Yes    Durable POA for healthcare:  Yes    Advanced directive: Yes      PREVENTIVE SCREENINGS      Cardiovascular Screening:    General: Screening Not Indicated and History Lipid Disorder      Cervical Cancer Screening:    General: Screening Not Indicated      Lung Cancer Screening: General: Screening Not Indicated    Screening, Brief Intervention, and Referral to Treatment (SBIRT)    Screening  Typical number of drinks in a day: 2  Typical number of drinks in a week: 2  Interpretation: Low risk drinking behavior      AUDIT-C Screenin) How often did you have a drink containing alcohol in the past year? never  2) How many drinks did you have on a typical day when you were drinking in the past year? 0  3) How often did you have 6 or more drinks on one occasion in the past year? never    AUDIT-C Score: 0  Interpretation: Score 0-2 (female): Negative screen for alcohol misuse    Single Item Drug Screening:  How often have you used an illegal drug (including marijuana) or a prescription medication for non-medical reasons in the past year? never    Single Item Drug Screen Score: 0  Interpretation: Negative screen for possible drug use disorder      Concepcion Guy MD

## 2022-03-21 DIAGNOSIS — E78.5 HYPERLIPIDEMIA, UNSPECIFIED HYPERLIPIDEMIA TYPE: ICD-10-CM

## 2022-03-21 DIAGNOSIS — I10 HYPERTENSION, UNSPECIFIED TYPE: ICD-10-CM

## 2022-03-22 RX ORDER — ATORVASTATIN CALCIUM 20 MG/1
20 TABLET, FILM COATED ORAL DAILY
Qty: 90 TABLET | Refills: 2 | Status: SHIPPED | OUTPATIENT
Start: 2022-03-22

## 2022-03-22 RX ORDER — LISINOPRIL 10 MG/1
10 TABLET ORAL DAILY
Qty: 90 TABLET | Refills: 2 | Status: SHIPPED | OUTPATIENT
Start: 2022-03-22

## 2022-08-09 ENCOUNTER — OFFICE VISIT (OUTPATIENT)
Dept: FAMILY MEDICINE CLINIC | Facility: CLINIC | Age: 82
End: 2022-08-09
Payer: MEDICARE

## 2022-08-09 VITALS
HEIGHT: 65 IN | SYSTOLIC BLOOD PRESSURE: 146 MMHG | DIASTOLIC BLOOD PRESSURE: 80 MMHG | OXYGEN SATURATION: 98 % | HEART RATE: 62 BPM | TEMPERATURE: 97.8 F | BODY MASS INDEX: 27.82 KG/M2 | WEIGHT: 167 LBS

## 2022-08-09 DIAGNOSIS — E78.5 HYPERLIPIDEMIA, UNSPECIFIED HYPERLIPIDEMIA TYPE: ICD-10-CM

## 2022-08-09 DIAGNOSIS — I10 PRIMARY HYPERTENSION: ICD-10-CM

## 2022-08-09 DIAGNOSIS — I50.32 CHRONIC DIASTOLIC HEART FAILURE (HCC): ICD-10-CM

## 2022-08-09 DIAGNOSIS — Z76.89 ENCOUNTER TO ESTABLISH CARE WITH NEW DOCTOR: Primary | ICD-10-CM

## 2022-08-09 DIAGNOSIS — I35.1 MODERATE AORTIC REGURGITATION: ICD-10-CM

## 2022-08-09 DIAGNOSIS — E03.9 ACQUIRED HYPOTHYROIDISM: ICD-10-CM

## 2022-08-09 PROCEDURE — 99204 OFFICE O/P NEW MOD 45 MIN: CPT | Performed by: FAMILY MEDICINE

## 2022-08-09 NOTE — PROGRESS NOTES
Assessment/Plan:    No problem-specific Assessment & Plan notes found for this encounter  Diagnoses and all orders for this visit:    Encounter to establish care with new doctor    Hyperlipidemia, unspecified hyperlipidemia type  -     Lipid panel; Future  -     CBC and differential; Future  -     Comprehensive metabolic panel; Future    Primary hypertension  -     Lipid panel; Future  -     CBC and differential; Future  -     Comprehensive metabolic panel; Future    Chronic diastolic heart failure ([address] )  Noted on echo, patient with no signs and symptoms  Acquired hypothyroidism  -     TSH, 3rd generation with Free T4 reflex; Future        Subjective:      Patient ID: José Luis Montaño is a 78 y o  female  HPI     Patient presents to the office to establish care  Notes that her provider is retiring and she is looking for a new provider  Notes that she follows with urogyn, dermatology, podiatry, hematology  The following portions of the patient's history were reviewed and updated as appropriate: allergies, current medications, past family history, past medical history, past social history, past surgical history and problem list     Review of Systems   Constitutional: Negative for activity change, appetite change, chills, fatigue and fever  HENT: Negative for congestion, ear pain, rhinorrhea and sore throat  Respiratory: Negative for cough and shortness of breath  Cardiovascular: Negative for chest pain and leg swelling  Gastrointestinal: Negative for abdominal distention, abdominal pain, constipation, diarrhea, nausea and vomiting  Genitourinary: Negative for dysuria, frequency and urgency  Musculoskeletal: Negative for gait problem  Skin: Negative for rash  Neurological: Negative for dizziness, light-headedness and headaches         Objective:  /80 (BP Location: Left arm, Patient Position: Sitting, Cuff Size: Adult)   Pulse 62   Temp 97 8 °F (36 6 °C)   Ht 5' 5" (1 651 m) Wt 75 8 kg (167 lb)   LMP  (LMP Unknown)   SpO2 98%   BMI 27 79 kg/m²      Physical Exam  Vitals reviewed  Constitutional:       General: She is not in acute distress  Appearance: Normal appearance  HENT:      Head: Normocephalic and atraumatic  Right Ear: External ear normal       Left Ear: External ear normal       Nose: Nose normal       Mouth/Throat:      Mouth: Mucous membranes are moist    Eyes:      Extraocular Movements: Extraocular movements intact  Conjunctiva/sclera: Conjunctivae normal       Pupils: Pupils are equal, round, and reactive to light  Cardiovascular:      Rate and Rhythm: Normal rate and regular rhythm  Heart sounds: Normal heart sounds  Pulmonary:      Effort: Pulmonary effort is normal       Breath sounds: Normal breath sounds  No wheezing, rhonchi or rales  Abdominal:      General: Abdomen is flat  Bowel sounds are normal  There is no distension  Palpations: Abdomen is soft  Tenderness: There is no abdominal tenderness  Musculoskeletal:         General: No deformity  Cervical back: Neck supple  Right lower leg: No edema  Left lower leg: No edema  Lymphadenopathy:      Cervical: No cervical adenopathy  Skin:     General: Skin is warm  Capillary Refill: Capillary refill takes less than 2 seconds  Findings: No rash  Neurological:      Mental Status: She is alert  Mental status is at baseline             Yunior Lisa DO  [facility]  8/9/2022 11:03 AM

## 2022-08-16 LAB
ALBUMIN SERPL-MCNC: 4.2 G/DL (ref 3.6–5.1)
ALBUMIN/GLOB SERPL: 1.6 (CALC) (ref 1–2.5)
ALP SERPL-CCNC: 89 U/L (ref 37–153)
ALT SERPL-CCNC: 15 U/L (ref 6–29)
AST SERPL-CCNC: 22 U/L (ref 10–35)
BASOPHILS # BLD AUTO: 52 CELLS/UL (ref 0–200)
BASOPHILS NFR BLD AUTO: 1.1 %
BILIRUB SERPL-MCNC: 0.7 MG/DL (ref 0.2–1.2)
BUN SERPL-MCNC: 14 MG/DL (ref 7–25)
BUN/CREAT SERPL: NORMAL (CALC) (ref 6–22)
CALCIUM SERPL-MCNC: 9.4 MG/DL (ref 8.6–10.4)
CHLORIDE SERPL-SCNC: 104 MMOL/L (ref 98–110)
CHOLEST SERPL-MCNC: 209 MG/DL
CHOLEST/HDLC SERPL: 3.1 (CALC)
CO2 SERPL-SCNC: 29 MMOL/L (ref 20–32)
CREAT SERPL-MCNC: 0.78 MG/DL (ref 0.6–0.95)
EOSINOPHIL # BLD AUTO: 113 CELLS/UL (ref 15–500)
EOSINOPHIL NFR BLD AUTO: 2.4 %
ERYTHROCYTE [DISTWIDTH] IN BLOOD BY AUTOMATED COUNT: 13 % (ref 11–15)
GFR/BSA.PRED SERPLBLD CYS-BASED-ARV: 75 ML/MIN/1.73M2
GLOBULIN SER CALC-MCNC: 2.7 G/DL (CALC) (ref 1.9–3.7)
GLUCOSE SERPL-MCNC: 97 MG/DL (ref 65–99)
HCT VFR BLD AUTO: 42 % (ref 35–45)
HDLC SERPL-MCNC: 68 MG/DL
HGB BLD-MCNC: 14.4 G/DL (ref 11.7–15.5)
LDLC SERPL CALC-MCNC: 115 MG/DL (CALC)
LYMPHOCYTES # BLD AUTO: 1889 CELLS/UL (ref 850–3900)
LYMPHOCYTES NFR BLD AUTO: 40.2 %
MCH RBC QN AUTO: 32.1 PG (ref 27–33)
MCHC RBC AUTO-ENTMCNC: 34.3 G/DL (ref 32–36)
MCV RBC AUTO: 93.8 FL (ref 80–100)
MONOCYTES # BLD AUTO: 475 CELLS/UL (ref 200–950)
MONOCYTES NFR BLD AUTO: 10.1 %
NEUTROPHILS # BLD AUTO: 2171 CELLS/UL (ref 1500–7800)
NEUTROPHILS NFR BLD AUTO: 46.2 %
NONHDLC SERPL-MCNC: 141 MG/DL (CALC)
PLATELET # BLD AUTO: 240 THOUSAND/UL (ref 140–400)
PMV BLD REES-ECKER: 10.3 FL (ref 7.5–12.5)
POTASSIUM SERPL-SCNC: 4.3 MMOL/L (ref 3.5–5.3)
PROT SERPL-MCNC: 6.9 G/DL (ref 6.1–8.1)
RBC # BLD AUTO: 4.48 MILLION/UL (ref 3.8–5.1)
SODIUM SERPL-SCNC: 140 MMOL/L (ref 135–146)
TRIGL SERPL-MCNC: 148 MG/DL
WBC # BLD AUTO: 4.7 THOUSAND/UL (ref 3.8–10.8)

## 2022-09-29 NOTE — TELEPHONE ENCOUNTER

## 2022-10-12 ENCOUNTER — OFFICE VISIT (OUTPATIENT)
Dept: DERMATOLOGY | Facility: CLINIC | Age: 82
End: 2022-10-12
Payer: MEDICARE

## 2022-10-12 VITALS — BODY MASS INDEX: 27.82 KG/M2 | HEIGHT: 65 IN | WEIGHT: 167 LBS

## 2022-10-12 DIAGNOSIS — L82.0 INFLAMED SEBORRHEIC KERATOSIS: Primary | ICD-10-CM

## 2022-10-12 DIAGNOSIS — Z85.828 HISTORY OF SKIN CANCER: ICD-10-CM

## 2022-10-12 DIAGNOSIS — L82.1 SEBORRHEIC KERATOSIS: ICD-10-CM

## 2022-10-12 DIAGNOSIS — Z13.89 SCREENING FOR SKIN CONDITION: ICD-10-CM

## 2022-10-12 PROCEDURE — 17110 DESTRUCTION B9 LES UP TO 14: CPT | Performed by: DERMATOLOGY

## 2022-10-12 PROCEDURE — 99213 OFFICE O/P EST LOW 20 MIN: CPT | Performed by: DERMATOLOGY

## 2022-10-12 NOTE — PROGRESS NOTES
[address] [facility] [place] DERMATOLOGY  [address] [place] [address]  Astrid Saunders [id number]  [phone]  [phone]     MRN: [id number] : 1940  Encounter: [id number]  Patient Information: Cristina Son  Chief complaint: 6 month check up    History of present illness:  66-year-old female presents for six-month checkup with previous history of skin cancer concerned regarding some itchy spots on her chest no other concerns noted  Past Medical History:   Diagnosis Date   • Basal cell carcinoma    • H/O colonoscopy    • History of nonmelanoma skin cancer     last assessed 17   • Hyperlipidemia    • Hypertension    • Inflamed seborrheic keratosis    • Overweight with body mass index (BMI) of 27 to 27 9 in adult    • Sciatica    • Uterine fibroid    • Viral warts      Past Surgical History:   Procedure Laterality Date   • MOHS SURGERY  2012    [facility] [place]   • THYROID SURGERY     • TOTAL ABDOMINAL HYSTERECTOMY       Social History   Social History     Substance and Sexual Activity   Alcohol Use Yes   • Alcohol/week: 1 0 standard drink   • Types: 1 Cans of beer per week    Comment: Wine socially     Social History     Substance and Sexual Activity   Drug Use No     Social History     Tobacco Use   Smoking Status Never Smoker   Smokeless Tobacco Never Used     Family History   Problem Relation Age of Onset   • Heart failure Mother    • Arthritis Mother    • Hypertension Mother    • Kidney disease Sister    • Cancer Son      Meds/Allergies   Allergies   Allergen Reactions   • Aspirin        Meds:  Prior to Admission medications    Medication Sig Start Date End Date Taking?  Authorizing Provider   atorvastatin (LIPITOR) 20 mg tablet Take 1 tablet (20 mg total) by mouth daily 3/22/22  Yes Annette Agudelo MD   Calcium-Vitamin D-Vitamin K [id number] MG-UNT-MCG CHEW Chew   Yes Historical Provider, MD   estradiol (ESTRACE) 0 1 mg/g vaginal cream  19  Yes Historical Provider, MD Flaxseed, Linseed, (FLAXSEED OIL) 1000 MG CAPS Take 1 capsule by mouth daily 3/19/14  Yes Historical Provider, MD   levothyroxine 50 mcg tablet TAKE 1 TABLET BY MOUTH EVERY DAY 3/22/22  Yes Jose Roberto Page MD   lisinopril (ZESTRIL) 10 mg tablet Take 1 tablet (10 mg total) by mouth daily 3/22/22  Yes Jose Roberto Page MD   metoprolol succinate (TOPROL-XL) 25 mg 24 hr tablet TAKE 1 TABLET BY MOUTH EVERY DAY 3/22/22  Yes Jose Roberto Page MD   Multiple Vitamins-Minerals (MULTIVITAMIN GUMMIES ADULT) [facility] 16   Yes Historical Provider, MD       Subjective:     Review of Systems:    General: negative for - chills, fatigue, fever,  weight gain or weight loss  Psychological: negative for - anxiety, behavioral disorder, concentration difficulties, decreased libido, depression, irritability, memory difficulties, mood swings, sleep disturbances or suicidal ideation  ENT: negative for - hearing difficulties , nasal congestion, nasal discharge, oral lesions, sinus pain, sneezing, sore throat  Allergy and Immunology: negative for - hives, insect bite sensitivity,  Hematological and Lymphatic: negative for - bleeding problems, blood clots,bruising, swollen lymph nodes  Endocrine: negative for - hair pattern changes, hot flashes, malaise/lethargy, mood swings, palpitations, polydipsia/polyuria, skin changes, temperature intolerance or unexpected weight change  Respiratory: negative for - cough, hemoptysis, orthopnea, shortness of breath, or wheezing  Cardiovascular: negative for - chest pain, dyspnea on exertion, edema,  Gastrointestinal: negative for - abdominal pain, nausea/vomiting  Genito-Urinary: negative for - dysuria, incontinence, irregular/heavy menses or urinary frequency/urgency  Musculoskeletal: negative for - gait disturbance, joint pain, joint stiffness, joint swelling, muscle pain, muscular weakness  Dermatological:  As in HPI  Neurological: negative for confusion, dizziness, headaches, impaired coordination/balance, memory loss, numbness/tingling, seizures, speech problems, tremors or weakness       Objective:   Ht 5' 5" (1 651 m)   Wt 75 8 kg (167 lb)   LMP  (LMP Unknown)   BMI 27 79 kg/m²     Physical Exam:    General Appearance:    Alert, cooperative, no distress   Head:    Normocephalic, without obvious abnormality, atraumatic           Skin:   A full skin exam was performed including scalp, head scalp, eyes, ears, nose, lips, neck, chest, axilla, abdomen, back, buttocks, bilateral upper extremities, bilateral lower extremities, hands, feet, fingers, toes, fingernails, and toenails inflamed keratotic papules with greasy stuck on appearance on the right chest x2 normal keratotic papules with greasy stuck on appearance previous sites skin cancer well healed without recurrence nothing else atypical noted on complete exam  Cryotherapy Procedure Note    Pre-operative Diagnosis: inflamed seborrheic keratosis    Plan:  1  Instructed to keep the area dry and clean thereafter  Apply petrolatum if area gets crusty  2  Recommended that the patient use acetaminophen  as needed for pain  Locations:  Right chest    Indications: Destruction of irritated keratoses x2    Patient informed of risks (permanent scarring, infection, light or dark discoloration, bleeding, infection, weakness, numbness and recurrence of the lesion) and benefits of the procedure and verbal informed consent obtained  The areas are treated with liquid nitrogen therapy, frozen until ice ball extended 2 mm beyond lesion, allowed to thaw, and treated again  The patient tolerated procedure well  The patient was instructed on post-op care, warned that there may be blister formation, redness and pain  Recommend OTC analgesia as needed for pain  Condition:  Stable    Complications:  none  Assessment:     1  Inflamed seborrheic keratosis     2  Seborrheic keratosis     3  History of skin cancer     4   Screening for skin condition           Plan: Inflamed keratosis lesions treated because of irritation patient advise if they do not resolved to let us know  Seborrheic keratosis patient reassured these are normal growths we acquire with age no treatment needed  History of skin cancer in no recurrence nothing else atypical sunblock recommended follow-up in 6 months  Screening for dermatologic disorders nothing else of concern noted on complete exam follow-up in 6 months    Gordo Garcia  10/12/2022,12:36 PM    Portions of the record may have been created with voice recognition software   Occasional wrong word or "sound a like" substitutions may have occurred due to the inherent limitations of voice recognition software   Read the chart carefully and recognize, using context, where substitutions have occurred

## 2022-10-12 NOTE — PATIENT INSTRUCTIONS
Inflamed keratosis lesions treated because of irritation patient advise if they do not resolved to let us know  Seborrheic keratosis patient reassured these are normal growths we acquire with age no treatment needed  History of skin cancer in no recurrence nothing else atypical sunblock recommended follow-up in 6 months  Screening for dermatologic disorders nothing else of concern noted on complete exam follow-up in 6 months  Treatment with Cryotherapy    The doctor has treated your skin with nitrogen, which is 320 degrees Fahrenheit below zero  He has given the treated area "frostbite "    Stinging should subside within a few hours  You can take Tylenol for pain, if needed  Over the next few days, it is normal if the area becomes reddened, a blood blister, or swollen with fluid  If the lesion treated was near the eye - you could get a swollen eye over the next few days  Do not panic! This is all temporary, and will resolve with time  There is no special treatment - just keep the area clean  Makeup and BandAids can be used, if preferred  When the area starts to dry up and peel off, using Vaseline can help healing  It usually takes up to a month for it to heal   Some lesions are recurrent and may require repeat treatments  If a lesion has not healed in one month, please don't hesitate to contact us  If you have any further questions that are not answered here, please call us  [phone]    Thank you for allowing us to care for you

## 2022-12-12 DIAGNOSIS — I10 HYPERTENSION, UNSPECIFIED TYPE: ICD-10-CM

## 2022-12-12 DIAGNOSIS — E78.5 HYPERLIPIDEMIA, UNSPECIFIED HYPERLIPIDEMIA TYPE: ICD-10-CM

## 2022-12-12 DIAGNOSIS — E03.9 HYPOTHYROIDISM, UNSPECIFIED TYPE: ICD-10-CM

## 2022-12-12 RX ORDER — LISINOPRIL 10 MG/1
10 TABLET ORAL DAILY
Qty: 90 TABLET | Refills: 2 | Status: SHIPPED | OUTPATIENT
Start: 2022-12-12

## 2022-12-12 RX ORDER — METOPROLOL SUCCINATE 25 MG/1
25 TABLET, EXTENDED RELEASE ORAL DAILY
Qty: 90 TABLET | Refills: 2 | Status: SHIPPED | OUTPATIENT
Start: 2022-12-12

## 2022-12-12 RX ORDER — ATORVASTATIN CALCIUM 20 MG/1
20 TABLET, FILM COATED ORAL DAILY
Qty: 90 TABLET | Refills: 2 | Status: SHIPPED | OUTPATIENT
Start: 2022-12-12

## 2022-12-12 RX ORDER — LEVOTHYROXINE SODIUM 0.05 MG/1
50 TABLET ORAL DAILY
Qty: 90 TABLET | Refills: 2 | Status: SHIPPED | OUTPATIENT
Start: 2022-12-12

## 2022-12-12 NOTE — TELEPHONE ENCOUNTER
Requested medication(s) are due for refill today: Yes  Patient has already received a courtesy refill: No  Other reason request has been forwarded to provider: First time fill with us

## 2023-03-15 ENCOUNTER — RA CDI HCC (OUTPATIENT)
Dept: OTHER | Facility: HOSPITAL | Age: 83
End: 2023-03-15

## 2023-03-15 NOTE — PROGRESS NOTES
[facility]  coding opportunities     I11 0   Chart Reviewed number of suggestions sent to Provider: 1     Patients Insurance     Medicare Insurance: [employer]

## 2023-03-21 ENCOUNTER — OFFICE VISIT (OUTPATIENT)
Dept: FAMILY MEDICINE CLINIC | Facility: CLINIC | Age: 83
End: 2023-03-21

## 2023-03-21 VITALS
BODY MASS INDEX: 27.49 KG/M2 | HEART RATE: 62 BPM | DIASTOLIC BLOOD PRESSURE: 70 MMHG | TEMPERATURE: 97.5 F | SYSTOLIC BLOOD PRESSURE: 116 MMHG | WEIGHT: 165 LBS | HEIGHT: 65 IN | OXYGEN SATURATION: 94 %

## 2023-03-21 DIAGNOSIS — I35.1 MODERATE AORTIC REGURGITATION: ICD-10-CM

## 2023-03-21 DIAGNOSIS — Z78.0 ASYMPTOMATIC POSTMENOPAUSAL STATE: ICD-10-CM

## 2023-03-21 DIAGNOSIS — Z12.31 BREAST CANCER SCREENING BY MAMMOGRAM: ICD-10-CM

## 2023-03-21 DIAGNOSIS — I50.32 CHRONIC DIASTOLIC HEART FAILURE (HCC): ICD-10-CM

## 2023-03-21 DIAGNOSIS — I10 PRIMARY HYPERTENSION: Primary | ICD-10-CM

## 2023-03-21 DIAGNOSIS — N39.3 STRESS INCONTINENCE OF URINE: ICD-10-CM

## 2023-03-21 DIAGNOSIS — Z13.820 SCREENING FOR OSTEOPOROSIS: ICD-10-CM

## 2023-03-21 DIAGNOSIS — E78.5 HYPERLIPIDEMIA, UNSPECIFIED HYPERLIPIDEMIA TYPE: ICD-10-CM

## 2023-03-21 NOTE — PATIENT INSTRUCTIONS
Medicare Preventive Visit Patient Instructions  Thank you for completing your Welcome to Medicare Visit or Medicare Annual Wellness Visit today  Your next wellness visit will be due in one year (3/21/2024)  The screening/preventive services that you may require over the next 5-10 years are detailed below  Some tests may not apply to you based off risk factors and/or age  Screening tests ordered at today's visit but not completed yet may show as past due  Also, please note that scanned in results may not display below  Preventive Screenings:  Service Recommendations Previous Testing/Comments   Colorectal Cancer Screening  * Colonoscopy    * Fecal Occult Blood Test (FOBT)/Fecal Immunochemical Test (FIT)  * Fecal DNA/Cologuard Test  * Flexible Sigmoidoscopy Age: 39-70 years old   Colonoscopy: every 10 years (may be performed more frequently if at higher risk)  OR  FOBT/FIT: every 1 year  OR  Cologuard: every 3 years  OR  Sigmoidoscopy: every 5 years  Screening may be recommended earlier than age 39 if at higher risk for colorectal cancer  Also, an individualized decision between you and your healthcare provider will decide whether screening between the ages of 74-80 would be appropriate  Colonoscopy: 01/04/2021  FOBT/FIT: Not on file  Cologuard: 01/04/2021  Sigmoidoscopy: Not on file          Breast Cancer Screening Age: 36 years old  Frequency: every 1-2 years  Not required if history of left and right mastectomy Mammogram: 10/03/2019        Cervical Cancer Screening Between the ages of 21-29, pap smear recommended once every 3 years  Between the ages of 33-67, can perform pap smear with HPV co-testing every 5 years     Recommendations may differ for women with a history of total hysterectomy, cervical cancer, or abnormal pap smears in past  Pap Smear: Not on file    Screening Not Indicated   Hepatitis C Screening Once for adults born between [facility]  More frequently in patients at high risk for Hepatitis C Hep C Antibody: Not on file        Diabetes Screening 1-2 times per year if you're at risk for diabetes or have pre-diabetes Fasting glucose: 94 mg/dL (8/25/2020)  A1C: 5 7 % (11/11/2019)  Screening Current   Cholesterol Screening Once every 5 years if you don't have a lipid disorder  May order more often based on risk factors  Lipid panel: 08/16/2022    Screening Not Indicated  History Lipid Disorder     Other Preventive Screenings Covered by Medicare:  1  Abdominal Aortic Aneurysm (AAA) Screening: covered once if your at risk  You're considered to be at risk if you have a family history of AAA  2  Lung Cancer Screening: covers low dose CT scan once per year if you meet all of the following conditions: (1) Age 50-69; (2) No signs or symptoms of lung cancer; (3) Current smoker or have quit smoking within the last 15 years; (4) You have a tobacco smoking history of at least 20 pack years (packs per day multiplied by number of years you smoked); (5) You get a written order from a healthcare provider  3  Glaucoma Screening: covered annually if you're considered high risk: (1) You have diabetes OR (2) Family history of glaucoma OR (3)  aged 48 and older OR (3)  American aged 72 and older  3  Osteoporosis Screening: covered every 2 years if you meet one of the following conditions: (1) You're estrogen deficient and at risk for osteoporosis based off medical history and other findings; (2) Have a vertebral abnormality; (3) On glucocorticoid therapy for more than 3 months; (4) Have primary hyperparathyroidism; (5) On osteoporosis medications and need to assess response to drug therapy  · Last bone density test (DXA Scan): 07/12/2017  5  HIV Screening: covered annually if you're between the age of 12-76  Also covered annually if you are younger than 13 and older than 72 with risk factors for HIV infection   For pregnant patients, it is covered up to 3 times per pregnancy  Immunizations:  Immunization Recommendations   Influenza Vaccine Annual influenza vaccination during flu season is recommended for all persons aged >= 6 months who do not have contraindications   Pneumococcal Vaccine   * Pneumococcal conjugate vaccine = PCV13 (Prevnar 13), PCV15 (Vaxneuvance), PCV20 (Prevnar 20)  * Pneumococcal polysaccharide vaccine = PPSV23 (Pneumovax) Adults 25-60 years old: 1-3 doses may be recommended based on certain risk factors  Adults 72 years old: 1-2 doses may be recommended based off what pneumonia vaccine you previously received   Hepatitis B Vaccine 3 dose series if at intermediate or high risk (ex: diabetes, end stage renal disease, liver disease)   Tetanus (Td) Vaccine - COST NOT COVERED BY MEDICARE PART B Following completion of primary series, a booster dose should be given every 10 years to maintain immunity against tetanus  Td may also be given as tetanus wound prophylaxis  Tdap Vaccine - COST NOT COVERED BY MEDICARE PART B Recommended at least once for all adults  For pregnant patients, recommended with each pregnancy  Shingles Vaccine (Shingrix) - COST NOT COVERED BY MEDICARE PART B  2 shot series recommended in those aged 48 and above     Health Maintenance Due:      Topic Date Due   • Breast Cancer Screening: Mammogram  10/03/2020     Immunizations Due:  There are no preventive care reminders to display for this patient  Advance Directives   What are advance directives? Advance directives are legal documents that state your wishes and plans for medical care  These plans are made ahead of time in case you lose your ability to make decisions for yourself  Advance directives can apply to any medical decision, such as the treatments you want, and if you want to donate organs  What are the types of advance directives? There are many types of advance directives, and each state has rules about how to use them   You may choose a combination of any of the following:  · Living will: This is a written record of the treatment you want  You can also choose which treatments you do not want, which to limit, and which to stop at a certain time  This includes surgery, medicine, IV fluid, and tube feedings  · Durable power of  for healthcare [facility]): This is a written record that states who you want to make healthcare choices for you when you are unable to make them for yourself  This person, called a proxy, is usually a family member or a friend  You may choose more than 1 proxy  · Do not resuscitate (DNR) order:  A DNR order is used in case your heart stops beating or you stop breathing  It is a request not to have certain forms of treatment, such as CPR  A DNR order may be included in other types of advance directives  · Medical directive: This covers the care that you want if you are in a coma, near death, or unable to make decisions for yourself  You can list the treatments you want for each condition  Treatment may include pain medicine, surgery, blood transfusions, dialysis, IV or tube feedings, and a ventilator (breathing machine)  · Values history: This document has questions about your views, beliefs, and how you feel and think about life  This information can help others choose the care that you would choose  Why are advance directives important? An advance directive helps you control your care  Although spoken wishes may be used, it is better to have your wishes written down  Spoken wishes can be misunderstood, or not followed  Treatments may be given even if you do not want them  An advance directive may make it easier for your family to make difficult choices about your care  Fall Prevention    Fall prevention  includes ways to make your home and other areas safer  It also includes ways you can move more carefully to prevent a fall   Health conditions that cause changes in your blood pressure, vision, or muscle strength and coordination may increase your risk for falls  Medicines may also increase your risk for falls if they make you dizzy, weak, or sleepy  Fall prevention tips:   · Stand or sit up slowly  · Use assistive devices as directed  · Wear shoes that fit well and have soles that   · Wear a personal alarm  · Stay active  · Manage your medical conditions  Home Safety Tips:  · Add items to prevent falls in the bathroom  · Keep paths clear  · Install bright lights in your home  · Keep items you use often on shelves within reach  · Paint or place reflective tape on the edges of your stairs  Urinary Incontinence   Urinary incontinence (UI)  is when you lose control of your bladder  UI develops because your bladder cannot store or empty urine properly  The 3 most common types of UI are stress incontinence, urge incontinence, or both  Medicines:   · May be given to help strengthen your bladder control  Report any side effects of medication to your healthcare provider  Do pelvic muscle exercises often:  Your pelvic muscles help you stop urinating  Squeeze these muscles tight for 5 seconds, then relax for 5 seconds  Gradually work up to squeezing for 10 seconds  Do 3 sets of 15 repetitions a day, or as directed  This will help strengthen your pelvic muscles and improve bladder control  Train your bladder:  Go to the bathroom at set times, such as every 2 hours, even if you do not feel the urge to go  You can also try to hold your urine when you feel the urge to go  For example, hold your urine for 5 minutes when you feel the urge to go  As that becomes easier, hold your urine for 10 minutes  Self-care:   · Keep a UI record  Write down how often you leak urine and how much you leak  Make a note of what you were doing when you leaked urine  · Drink liquids as directed  You may need to limit the amount of liquid you drink to help control your urine leakage  Do not drink any liquid right before you go to bed  Limit or do not have drinks that contain caffeine or alcohol  · Prevent constipation  Eat a variety of high-fiber foods  Good examples are high-fiber cereals, beans, vegetables, and whole-grain breads  Walking is the best way to trigger your intestines to have a bowel movement  · Exercise regularly and maintain a healthy weight  Weight loss and exercise will decrease pressure on your bladder and help you control your leakage  · Use a catheter as directed  to help empty your bladder  A catheter is a tiny, plastic tube that is put into your bladder to drain your urine  · Go to behavior therapy as directed  Behavior therapy may be used to help you learn to control your urge to urinate  Weight Management   Why it is important to manage your weight:  Being overweight increases your risk of health conditions such as heart disease, high blood pressure, type 2 diabetes, and certain types of cancer  It can also increase your risk for osteoarthritis, sleep apnea, and other respiratory problems  Aim for a slow, steady weight loss  Even a small amount of weight loss can lower your risk of health problems  How to lose weight safely:  A safe and healthy way to lose weight is to eat fewer calories and get regular exercise  You can lose up about 1 pound a week by decreasing the number of calories you eat by 500 calories each day  Healthy meal plan for weight management:  A healthy meal plan includes a variety of foods, contains fewer calories, and helps you stay healthy  A healthy meal plan includes the following:  · Eat whole-grain foods more often  A healthy meal plan should contain fiber  Fiber is the part of grains, fruits, and vegetables that is not broken down by your body  Whole-grain foods are healthy and provide extra fiber in your diet  Some examples of whole-grain foods are whole-wheat breads and pastas, oatmeal, brown rice, and bulgur  · Eat a variety of vegetables every day    Include dark, leafy greens such as spinach, kale, samuel greens, and mustard greens  Eat yellow and orange vegetables such as carrots, sweet potatoes, and winter squash  · Eat a variety of fruits every day  Choose fresh or canned fruit (canned in its own juice or light syrup) instead of juice  Fruit juice has very little or no fiber  · Eat low-fat dairy foods  Drink fat-free (skim) milk or 1% milk  Eat fat-free yogurt and low-fat cottage cheese  Try low-fat cheeses such as mozzarella and other reduced-fat cheeses  · Choose meat and other protein foods that are low in fat  Choose beans or other legumes such as split peas or lentils  Choose fish, skinless poultry (chicken or turkey), or lean cuts of red meat (beef or pork)  Before you cook meat or poultry, cut off any visible fat  · Use less fat and oil  Try baking foods instead of frying them  Add less fat, such as margarine, sour cream, regular salad dressing and mayonnaise to foods  Eat fewer high-fat foods  Some examples of high-fat foods include french fries, doughnuts, ice cream, and cakes  · Eat fewer sweets  Limit foods and drinks that are high in sugar  This includes candy, cookies, regular soda, and sweetened drinks  Exercise:  Exercise at least 30 minutes per day on most days of the week  Some examples of exercise include walking, biking, dancing, and swimming  You can also fit in more physical activity by taking the stairs instead of the elevator or parking farther away from stores  Ask your healthcare provider about the best exercise plan for you  © Copyright [employer] 2018 Information is for End User's use only and may not be sold, redistributed or otherwise used for commercial purposes  All illustrations and images included in CareNotes® are the copyrighted property of [employer]  or Vin [employer]    Urinary Incontinence   AMBULATORY CARE:   Urinary incontinence (UI)  is when you lose control of your bladder   UI develops because your bladder cannot store or empty urine properly  The 3 most common types of UI are stress incontinence, urge incontinence, or both  Common symptoms include the following:   • You feel like your bladder does not empty completely when you urinate  • You urinate often and need to urinate immediately  • You leak urine when you sleep, or you wake up with the urge to urinate  • You leak urine when you cough, sneeze, exercise, or laugh  Call your doctor if:   • You have severe pain  • You are confused or cannot think clearly  • You have a fever  • You see blood in your urine  • You have pain when you urinate  • You have new or worse pain, even after treatment  • Your mouth feels dry or you have vision changes  • Your urine is cloudy or smells bad  • You have questions or concerns about your condition or care  Medicines:   • Medicines  may be given to help strengthen your bladder control  • Take your medicine as directed  Contact your healthcare provider if you think your medicine is not helping or if you have side effects  Tell your provider if you are allergic to any medicine  Keep a list of the medicines, vitamins, and herbs you take  Include the amounts, and when and why you take them  Bring the list or the pill bottles to follow-up visits  Carry your medicine list with you in case of an emergency  Do pelvic muscle exercises often:  Your pelvic muscles help you stop urinating  Squeeze these muscles tight for 5 seconds, then relax for 5 seconds  Gradually work up to squeezing for 10 seconds  Do 3 sets of 15 repetitions a day, or as directed  This will help strengthen your pelvic muscles and improve bladder control  Train your bladder:  Go to the bathroom at set times, such as every 2 hours, even if you do not feel the urge to go  You can also try to hold your urine when you feel the urge to go  For example, hold your urine for 5 minutes when you feel the urge to go   As that becomes easier, hold your urine for 10 minutes  Self-care:   • Keep a UI record  Write down how often you leak urine and how much you leak  Make a note of what you were doing when you leaked urine  • Drink liquids as directed  Ask your healthcare provider how much liquid to drink each day and which liquids are best for you  You may need to limit the amount of liquid you drink to help control your urine leakage  Do not drink any liquid right before you go to bed  Limit or do not have drinks that contain caffeine or alcohol  • Prevent constipation  Eat a variety of high-fiber foods  Good examples are high-fiber cereals, beans, vegetables, and whole-grain breads  Prune juice may help make your bowel movement softer  Walking is the best way to trigger your intestines to have a bowel movement  • Exercise regularly and maintain a healthy weight  Ask your healthcare provider how much you should weigh and about the best exercise plan for you  Weight loss and exercise will decrease pressure on your bladder and help you control your leakage  Ask him or her to help you create a weight loss plan if you are overweight  • Use a catheter as directed  to help empty your bladder  A catheter is a tiny, plastic tube that is put into your bladder to drain your urine  Your healthcare provider may tell you to use a catheter to prevent your bladder from getting too full and leaking urine  • Go to behavior therapy as directed  Behavior therapy may be used to help you learn to control your urge to urinate  Follow up with your doctor as directed:  Write down your questions so you remember to ask them during your visits  © [employer] 2022 Information is for End User's use only and may not be sold, redistributed or otherwise used for commercial purposes  The above information is an  only  It is not intended as medical advice for individual conditions or treatments   Talk to your doctor, nurse or pharmacist before following any medical regimen to see if it is safe and effective for you  Kegel Exercises for Women   AMBULATORY CARE:   Kegel exercises  help strengthen your pelvic muscles  Pelvic muscles hold your pelvic organs, such as your bladder and uterus, in place  Kegel exercises help prevent or control certain conditions, such as urine incontinence (leakage) or uterine prolapse  Call your doctor or physical therapist if:   • You cannot feel your muscles tighten or relax  • You continue to leak urine  • You have questions or concerns about your condition or care  Use the correct muscles:  Pelvic muscles are the muscles you use to control urine flow  To target these muscles, stop and start the flow of urine several times  This will help you become familiar with how it feels to tighten and relax these muscles  How to do Kegel exercises:   • Get into a comfortable position  You may lie down, stand up, or sit down to do these exercises  When you first try to do these exercises, it may be easier if you lie down  • Tighten or squeeze your pelvic muscles slowly  It may feel like you are trying to hold back urine or gas  Hold this position for 3 seconds  Relax for 3 seconds  Repeat this cycle 10 times  Do not hold your breath when you do Kegel exercises  Keep your stomach, back, and leg muscles relaxed  • Do 10 sets of Kegel exercises, at least 3 times a day  When you know how to do Kegel exercises, use different positions  This will help to strengthen your pelvic muscles as much as possible  You can do these exercises while you lie on the floor, watch TV, or while you stand  Tighten your pelvic muscles before you sneeze, cough, or lift to prevent urine leakage  You may notice improved bladder control within about 6 weeks  Follow up with your doctor or physical therapist as directed:  Write down your questions so you remember to ask them during your visits    © Copyright [employer] 2022 Information is for End User's use only and may not be sold, redistributed or otherwise used for commercial purposes  The above information is an  only  It is not intended as medical advice for individual conditions or treatments  Talk to your doctor, nurse or pharmacist before following any medical regimen to see if it is safe and effective for you

## 2023-03-21 NOTE — PROGRESS NOTES
Assessment and Plan:     Problem List Items Addressed This Visit        Cardiovascular and Mediastinum    Chronic diastolic heart failure ([address] )    Relevant Orders    Echo complete w/ contrast if indicated    Moderate aortic regurgitation    Relevant Orders    Echo complete w/ contrast if indicated    Hypertension - Primary       Other    Stress incontinence of urine    Hyperlipidemia   Other Visit Diagnoses     Breast cancer screening by mammogram        Relevant Orders    Mammo screening bilateral w 3d & cad    Asymptomatic postmenopausal state        Relevant Orders    DXA bone density spine hip and pelvis    Screening for osteoporosis        Relevant Orders    DXA bone density spine hip and pelvis           Preventive health issues were discussed with patient, and age appropriate screening tests were ordered as noted in patient's After Visit Summary  Personalized health advice and appropriate referrals for health education or preventive services given if needed, as noted in patient's After Visit Summary  History of Present Illness:     Patient presents for a Medicare Wellness Visit    HPI   Patient Care Team:  Cici Hansen DO as PCP - General (Family Medicine)  Elisabeth Stockton MD as PCP - PCP-edwin (RTE)  Radha Pop MD    Patient presents to the office for follow up  She is seeing Dr Phill Abreu from [facility]  Notes that she is doing well  Not having UTIs  States that she was on a medication that was very drying but she stopped that  She is not waking up during the night  Wearing a pad during the day  Saw hematology, told she has no issues with her blood  Saw ortho, getting shots in left knee  Seeing Dr Mookie Gracia next month        Review of Systems:     Review of Systems     Problem List:     Patient Active Problem List   Diagnosis   • Chronic diastolic heart failure (HCC)   • Stress incontinence of urine   • Chest wall pain   • Acquired hypothyroidism   • Seborrheic keratosis   • History of skin cancer   • Moderate aortic regurgitation   • Atrophic vaginitis   • Overweight   • Hyperlipidemia   • Hypertension      Past Medical and Surgical History:     Past Medical History:   Diagnosis Date   • Basal cell carcinoma    • H/O colonoscopy    • History of nonmelanoma skin cancer     last assessed 11/20/17   • Hyperlipidemia    • Hypertension    • Inflamed seborrheic keratosis    • Overweight with body mass index (BMI) of 27 to 27 9 in adult    • Sciatica    • Uterine fibroid    • Viral warts      Past Surgical History:   Procedure Laterality Date   • MOHS SURGERY  11/08/2012    BCC L inner [place]   • THYROID SURGERY     • TOTAL ABDOMINAL HYSTERECTOMY        Family History:     Family History   Problem Relation Age of Onset   • Heart failure Mother    • Arthritis Mother    • Hypertension Mother    • Kidney disease Sister    • Cancer Son       Social History:     Social History     Socioeconomic History   • Marital status:      Spouse name: None   • Number of children: None   • Years of education: None   • Highest education level: None   Occupational History   • None   Tobacco Use   • Smoking status: Never   • Smokeless tobacco: Never   Vaping Use   • Vaping Use: Never used   Substance and Sexual Activity   • Alcohol use: Yes     Alcohol/week: 1 0 standard drink     Types: 1 Cans of beer per week     Comment: Wine socially   • Drug use: No   • Sexual activity: Never   Other Topics Concern   • None   Social History Narrative    Active advance directive    Drinks coffee    Living independently alone     Social Determinants of Health     Financial Resource Strain: Low Risk    • Difficulty of Paying Living Expenses: Not hard at all   Food Insecurity: Not on file   Transportation Needs: No Transportation Needs   • Lack of Transportation (Medical): No   • Lack of Transportation (Non-Medical):  No   Physical Activity: Not on file   Stress: Not on file   Social Connections: Not on file   Intimate Partner Violence: Not on file   Housing Stability: Not on file      Medications and Allergies:     Current Outpatient Medications   Medication Sig Dispense Refill   • atorvastatin (LIPITOR) 20 mg tablet Take 1 tablet (20 mg total) by mouth daily 90 tablet 2   • Calcium-Vitamin D-Vitamin K 500-100-40 MG-UNT-MCG CHEW Chew     • estradiol (ESTRACE) 0 1 mg/g vaginal cream      • Flaxseed, Linseed, (FLAXSEED OIL) 1000 MG CAPS Take 1 capsule by mouth daily     • levothyroxine 50 mcg tablet Take 1 tablet (50 mcg total) by mouth daily 90 tablet 2   • lisinopril (ZESTRIL) 10 mg tablet Take 1 tablet (10 mg total) by mouth daily 90 tablet 2   • metoprolol succinate (TOPROL-XL) 25 mg 24 hr tablet Take 1 tablet (25 mg total) by mouth daily 90 tablet 2   • Multiple Vitamins-Minerals (MULTIVITAMIN GUMMIES ADULT) CHEW Chew       No current facility-administered medications for this visit  Allergies   Allergen Reactions   • Aspirin       Immunizations:     Immunization History   Administered Date(s) Administered   • COVID-19 PFIZER VACCINE 0 3 ML IM 03/17/2021, 04/07/2021, 01/11/2022   • COVID-19 Pfizer Vac BIVALENT Jack-sucrose 12 Yr+ IM (BOOSTER ONLY) 10/15/2022   • INFLUENZA 09/04/2014, 10/29/2015, 11/09/2016, 10/04/2017, 11/07/2018, 11/10/2021, 10/15/2022   • Influenza Split High Dose Preservative Free IM 10/29/2015, 11/09/2016, 10/04/2017   • Influenza, high dose seasonal 0 7 mL 11/07/2018, 11/11/2019   • Influenza, seasonal, injectable 10/15/2013   • Pneumococcal Conjugate 13-Valent 11/09/2016   • Pneumococcal Polysaccharide PPV23 08/09/2012   • Tdap 10/19/2005   • Zoster 01/28/2013   • Zoster Vaccine Recombinant 10/05/2021, 12/07/2021      Health Maintenance:         Topic Date Due   • Breast Cancer Screening: Mammogram  10/03/2020     There are no preventive care reminders to display for this patient  Medicare Screening Tests and Risk Assessments:     Dorothy Mobley is here for her Subsequent Wellness visit   Last Medicare Wellness visit information reviewed, patient interviewed and updates made to the record today  Health Risk Assessment:   Patient rates overall health as very good  Patient feels that their physical health rating is same  Patient is satisfied with their life  Eyesight was rated as same  Hearing was rated as same  Patient feels that their emotional and mental health rating is same  Patients states they are never, rarely angry  Patient states they are sometimes unusually tired/fatigued  Pain experienced in the last 7 days has been some  Patient's pain rating has been 3/10  Patient states that she has experienced no weight loss or gain in last 6 months  Depression Screening:   PHQ-2 Score: 0      Fall Risk Screening: In the past year, patient has experienced: history of falling in past year    Number of falls: 1  Injured during fall?: No    Feels unsteady when standing or walking?: No    Worried about falling?: No      Urinary Incontinence Screening:   Patient has leaked urine accidently in the last six months  Home Safety:  Patient has trouble with stairs inside or outside of their home  Patient has working smoke alarms and has no working carbon monoxide detector  Home safety hazards include: none  Nutrition:   Current diet is Regular  Medications:   Patient is currently taking over-the-counter supplements  OTC medications include: see medication list  Patient is able to manage medications  Activities of Daily Living (ADLs)/Instrumental Activities of Daily Living (IADLs):   Walk and transfer into and out of bed and chair?: Yes  Dress and groom yourself?: Yes    Bathe or shower yourself?: Yes    Feed yourself?  Yes  Do your laundry/housekeeping?: Yes  Manage your money, pay your bills and track your expenses?: Yes  Make your own meals?: Yes    Do your own shopping?: Yes    Previous Hospitalizations:   Any hospitalizations or ED visits within the last 12 months?: No      Advance Care Planning:   Living will: Yes    Durable POA for healthcare: Yes    Advanced directive: Yes      PREVENTIVE SCREENINGS      Cardiovascular Screening:    General: Screening Not Indicated and History Lipid Disorder      Diabetes Screening:     General: Screening Current      Cervical Cancer Screening:    General: Screening Not Indicated      Lung Cancer Screening:     General: Screening Not Indicated    Screening, Brief Intervention, and Referral to Treatment (SBIRT)    Screening  Typical number of drinks in a day: 0  Typical number of drinks in a week: 1  Interpretation: Low risk drinking behavior  Single Item Drug Screening:  How often have you used an illegal drug (including marijuana) or a prescription medication for non-medical reasons in the past year? never    Single Item Drug Screen Score: 0  Interpretation: Negative screen for possible drug use disorder    Brief Intervention  Alcohol & drug use screenings were reviewed  No concerns regarding substance use disorder identified  No results found  Physical Exam:     /70 (BP Location: Left arm, Patient Position: Sitting, Cuff Size: Adult)   Pulse 62   Temp 97 5 °F (36 4 °C)   Ht 5' 5" (1 651 m)   Wt 74 8 kg (165 lb)   LMP  (LMP Unknown)   SpO2 94%   BMI 27 46 kg/m²     Physical Exam  Vitals reviewed  Constitutional:       General: She is not in acute distress  Appearance: Normal appearance  HENT:      Head: Normocephalic and atraumatic  Right Ear: External ear normal       Left Ear: External ear normal       Nose: Nose normal       Mouth/Throat:      Mouth: Mucous membranes are moist    Eyes:      Extraocular Movements: Extraocular movements intact  Conjunctiva/sclera: Conjunctivae normal       Pupils: Pupils are equal, round, and reactive to light  Cardiovascular:      Rate and Rhythm: Normal rate and regular rhythm  Pulmonary:      Effort: Pulmonary effort is normal       Breath sounds: Normal breath sounds     Abdominal:      General: Bowel sounds are normal  There is no distension  Palpations: Abdomen is soft  Tenderness: There is no abdominal tenderness  Musculoskeletal:      Cervical back: Neck supple  Right lower leg: No edema  Left lower leg: No edema  Lymphadenopathy:      Cervical: No cervical adenopathy  Skin:     General: Skin is warm  Capillary Refill: Capillary refill takes less than 2 seconds  Findings: No rash  Neurological:      Mental Status: She is alert  Mental status is at baseline          Jamar Escalona DO

## 2023-04-05 ENCOUNTER — HOSPITAL ENCOUNTER (OUTPATIENT)
Dept: NON INVASIVE DIAGNOSTICS | Facility: HOSPITAL | Age: 83
Discharge: HOME | End: 2023-04-05
Attending: FAMILY MEDICINE

## 2023-04-05 VITALS
WEIGHT: 165 LBS | SYSTOLIC BLOOD PRESSURE: 116 MMHG | HEART RATE: 86 BPM | BODY MASS INDEX: 27.49 KG/M2 | DIASTOLIC BLOOD PRESSURE: 70 MMHG | HEIGHT: 65 IN

## 2023-04-05 DIAGNOSIS — I50.32 CHRONIC DIASTOLIC HEART FAILURE (HCC): ICD-10-CM

## 2023-04-05 DIAGNOSIS — I35.1 MODERATE AORTIC REGURGITATION: ICD-10-CM

## 2023-04-05 LAB
AORTIC ROOT: 2.9 CM
APICAL FOUR CHAMBER EJECTION FRACTION: 51 %
ASCENDING AORTA: 3.3 CM
AV LVOT MEAN GRADIENT: 1 MMHG
AV LVOT PEAK GRADIENT: 2 MMHG
AV REGURGITATION PRESSURE HALF TIME: 487 MS
DOP CALC LVOT PEAK VEL VTI: 13 CM
DOP CALC LVOT PEAK VEL: 0.64 M/S
E WAVE DECELERATION TIME: 176 MS
FRACTIONAL SHORTENING: 25 (ref 28–44)
INTERVENTRICULAR SEPTUM IN DIASTOLE (PARASTERNAL SHORT AXIS VIEW): 1.3 CM
INTERVENTRICULAR SEPTUM: 1.3 CM (ref 0.6–1.1)
LA/AORTA RATIO 2D: 1.31
LAAS-AP2: 10.9 CM2
LAAS-AP4: 9.5 CM2
LEFT ATRIUM SIZE: 3.8 CM
LEFT INTERNAL DIMENSION IN SYSTOLE: 4 CM (ref 2.1–4)
LEFT VENTRICULAR INTERNAL DIMENSION IN DIASTOLE: 5.3 CM (ref 3.5–6)
LEFT VENTRICULAR POSTERIOR WALL IN END DIASTOLE: 1.1 CM
LEFT VENTRICULAR STROKE VOLUME: 65 ML
LVSV (TEICH): 65 ML
MV E'TISSUE VEL-SEP: 5 CM/S
MV PEAK A VEL: 0.95 M/S
MV PEAK E VEL: 44 CM/S
MV STENOSIS PRESSURE HALF TIME: 52 MS
MV VALVE AREA P 1/2 METHOD: 4.23
SL CV AV PEAK GRADIENT RETROGRADE: 55 MMHG
SL CV LV EF: 50
SL CV PED ECHO LEFT VENTRICLE DIASTOLIC VOLUME (MOD BIPLANE) 2D: 135 ML
SL CV PED ECHO LEFT VENTRICLE SYSTOLIC VOLUME (MOD BIPLANE) 2D: 70 ML
TRICUSPID ANNULAR PLANE SYSTOLIC EXCURSION: 2.6 CM

## 2023-09-02 DIAGNOSIS — I10 HYPERTENSION, UNSPECIFIED TYPE: ICD-10-CM

## 2023-09-02 DIAGNOSIS — E78.5 HYPERLIPIDEMIA, UNSPECIFIED HYPERLIPIDEMIA TYPE: ICD-10-CM

## 2023-09-02 RX ORDER — LISINOPRIL 10 MG/1
10 TABLET ORAL DAILY
Qty: 90 TABLET | Refills: 2 | Status: SHIPPED | OUTPATIENT
Start: 2023-09-02

## 2023-09-02 RX ORDER — ATORVASTATIN CALCIUM 20 MG/1
20 TABLET, FILM COATED ORAL DAILY
Qty: 90 TABLET | Refills: 2 | Status: SHIPPED | OUTPATIENT
Start: 2023-09-02

## 2023-09-02 RX ORDER — METOPROLOL SUCCINATE 25 MG/1
25 TABLET, EXTENDED RELEASE ORAL DAILY
Qty: 90 TABLET | Refills: 2 | Status: SHIPPED | OUTPATIENT
Start: 2023-09-02

## 2023-09-19 ENCOUNTER — OFFICE VISIT (OUTPATIENT)
Dept: FAMILY MEDICINE CLINIC | Facility: CLINIC | Age: 83
End: 2023-09-19
Payer: MEDICARE

## 2023-09-19 VITALS
SYSTOLIC BLOOD PRESSURE: 142 MMHG | HEIGHT: 65 IN | OXYGEN SATURATION: 99 % | HEART RATE: 60 BPM | DIASTOLIC BLOOD PRESSURE: 80 MMHG | WEIGHT: 162 LBS | TEMPERATURE: 98.1 F | BODY MASS INDEX: 26.99 KG/M2

## 2023-09-19 DIAGNOSIS — Z01.818 ENCOUNTER FOR PREOPERATIVE ASSESSMENT: Primary | ICD-10-CM

## 2023-09-19 DIAGNOSIS — I10 PRIMARY HYPERTENSION: ICD-10-CM

## 2023-09-19 DIAGNOSIS — I50.32 CHRONIC DIASTOLIC HEART FAILURE (HCC): ICD-10-CM

## 2023-09-19 PROCEDURE — 99214 OFFICE O/P EST MOD 30 MIN: CPT | Performed by: FAMILY MEDICINE

## 2023-09-19 NOTE — PROGRESS NOTES
[facility]    NAME: Amadeo Kraft  AGE: 78 y.o. SEX: female  : 1940     DATE: 2023    Internal Medicine Pre-Operative Evaluation      Chief Complaint: Pre-operative Evaluation     Surgery: left TKA  Anticipated Date of Surgery: 10/16/23  Referring Provider: Dr. Belia Avalos     History of Present Illness:     Amadeo Kraft is a 78 y.o. female who presents to the office today for a preoperative consultation at the request of surgeon Dr. Belia Avalos who plans on performing left TKA on 10/16/23. Planned anesthesia is spinal and IV sedation. Patient has a bleeding risk of: no recent abnormal bleeding. Patient does not have objections to receiving blood products if needed. Current anti-platelet/anti-coagulation medications that the patient is prescribed includes: none     Assessment of Chronic Conditions:   - Congestive Heart Failure: stable, euvolemic on exam  - Hypertension: stable, well controlled     Assessment of Cardiac Risk:  Denies unstable or severe angina or MI in the last 6 weeks or history of stent placement in the last year   Denies decompensated heart failure (e.g. New onset heart failure, NYHA functional class IV heart failure, or worsening existing heart failure)  Denies significant arrhythmias such as high grade AV block, symptomatic ventricular arrhythmia, newly recognized ventricular tachycardia, supraventricular tachycardia with resting heart rate >100, or symptomatic bradycardia  Denies severe heart valve disease including aortic stenosis or symptomatic mitral stenosis     Exercise Capacity:  Able to walk 4 blocks without symptoms?: Yes  Able to walk 2 flights without symptoms?: Yes    Prior Anesthesia Reactions: Yes, PONV      Personal history of venous thromboembolic disease? No    History of steroid use for >2 weeks within last year?  No    STOP-BANG Sleep Apnea Screening Questionnaire:        STOP-BANG Questionnaire (RYANN)     Snoring Loud enough to hear through a door? No   Tired Falling asleep while driving or talking? No   Observed Gasping or choking while sleeping? No   Pressure Hypertension? Yes   BMI >35? No   Age  Older then 48? Yes   Neck Size   >17 in Male? >16 in Female? No   Male  No        Risk Low (0-2)  Intermediate (3-4)  High (5-8) low      Review of Systems:     Review of Systems    Current Problem List:     Patient Active Problem List   Diagnosis   • Chronic diastolic heart failure (HCC)   • Stress incontinence of urine   • Chest wall pain   • Acquired hypothyroidism   • Seborrheic keratosis   • History of skin cancer   • Moderate aortic regurgitation   • Atrophic vaginitis   • Overweight   • Hyperlipidemia   • Hypertension     Allergies: Allergies   Allergen Reactions   • Aspirin        Medications:       Current Outpatient Medications:   •  atorvastatin (LIPITOR) 20 mg tablet, TAKE 1 TABLET BY MOUTH EVERY DAY, Disp: 90 tablet, Rfl: 2  •  Calcium-Vitamin D-Vitamin K 500-100-40 MG-UNT-MCG CHEW, Chew, Disp: , Rfl:   •  estradiol (ESTRACE) 0.1 mg/g vaginal cream, , Disp: , Rfl:   •  Flaxseed, Linseed, (FLAXSEED OIL) 1000 MG CAPS, Take 1 capsule by mouth daily, Disp: , Rfl:   •  levothyroxine 50 mcg tablet, Take 1 tablet (50 mcg total) by mouth daily, Disp: 90 tablet, Rfl: 2  •  lisinopril (ZESTRIL) 10 mg tablet, TAKE 1 TABLET BY MOUTH EVERY DAY, Disp: 90 tablet, Rfl: 2  •  metoprolol succinate (TOPROL-XL) 25 mg 24 hr tablet, TAKE 1 TABLET (25 MG TOTAL) BY MOUTH DAILY. , Disp: 90 tablet, Rfl: 2  •  Multiple Vitamins-Minerals (MULTIVITAMIN GUMMIES ADULT) CHEW, Chew, Disp: , Rfl:     Past Medical History:       Past Medical History:   Diagnosis Date   • Basal cell carcinoma    • H/O colonoscopy    • History of nonmelanoma skin cancer     last assessed 11/20/17   • Hyperlipidemia    • Hypertension    • Inflamed seborrheic keratosis    • Overweight with body mass index (BMI) of 27 to 27.9 in adult    • Sciatica • Uterine fibroid    • Viral warts         Past Surgical History:   Procedure Laterality Date   • MOHS SURGERY  11/08/2012    BCC L inner [facility]   • THYROID SURGERY     • TOTAL ABDOMINAL HYSTERECTOMY          Family History   Problem Relation Age of Onset   • Heart failure Mother    • Arthritis Mother    • Hypertension Mother    • Kidney disease Sister    • Cancer Son         Social History     Socioeconomic History   • Marital status:      Spouse name: Not on file   • Number of children: Not on file   • Years of education: Not on file   • Highest education level: Not on file   Occupational History   • Not on file   Tobacco Use   • Smoking status: Never   • Smokeless tobacco: Never   Vaping Use   • Vaping Use: Never used   Substance and Sexual Activity   • Alcohol use: Yes     Alcohol/week: 1.0 standard drink of alcohol     Types: 1 Cans of beer per week     Comment: Wine socially   • Drug use: No   • Sexual activity: Never   Other Topics Concern   • Not on file   Social History Narrative    Active advance directive    Drinks coffee    Living independently alone     Social Determinants of Health     Financial Resource Strain: Low Risk  (3/21/2023)    Overall Financial Resource Strain (CARDIA)    • Difficulty of Paying Living Expenses: Not hard at all   Food Insecurity: Not on file   Transportation Needs: No Transportation Needs (3/21/2023)    PRAPARE - Transportation    • Lack of Transportation (Medical): No    • Lack of Transportation (Non-Medical):  No   Physical Activity: Sufficiently Active (12/23/2020)    Exercise Vital Sign    • Days of Exercise per Week: 5 days    • Minutes of Exercise per Session: 60 min   Stress: No Stress Concern Present (12/23/2020)    [facility]    • Feeling of Stress : Not at all   Social Connections: Not on file   Intimate Partner Violence: Not on file   Housing Stability: Not on file        Physical Exam: Vitals:    09/19/23 0918   BP: 142/80   Pulse: 60   Temp: 98.1 °F (36.7 °C)   SpO2: 99%     Physical Exam  Vitals reviewed. Constitutional:       General: She is not in acute distress. Appearance: Normal appearance. HENT:      Head: Normocephalic and atraumatic. Right Ear: External ear normal.      Left Ear: External ear normal.      Nose: Nose normal.      Mouth/Throat:      Mouth: Mucous membranes are moist.   Eyes:      Extraocular Movements: Extraocular movements intact. Conjunctiva/sclera: Conjunctivae normal.   Cardiovascular:      Rate and Rhythm: Normal rate and regular rhythm. Pulmonary:      Effort: Pulmonary effort is normal.      Breath sounds: Normal breath sounds. No wheezing, rhonchi or rales. Abdominal:      General: Bowel sounds are normal. There is no distension. Palpations: Abdomen is soft. Tenderness: There is no abdominal tenderness. Musculoskeletal:      Cervical back: Neck supple. Right lower leg: No edema. Left lower leg: No edema. Lymphadenopathy:      Cervical: No cervical adenopathy. Skin:     General: Skin is warm. Capillary Refill: Capillary refill takes less than 2 seconds. Findings: No rash. Neurological:      Mental Status: She is alert. Mental status is at baseline. Data:     Pre-operative work-up  CBC:     Chemistry Profile:       Invalid input(s): "SLAMBGLUCOSE", "ALBUMIN"  Coagulation Studies:     Endocrine Studies:       Invalid input(s): "[id number]"    EKG: normal sinus rhythm, nonspecific ST and T waves changes. Chest x-ray: N/A    Previous cardiopulmonary studies within the past year:  Echocardiogram: 4/2023: EF 50%, no wall motion abnormalities, normal diastolic function. Mild aortic regurgitation  Cardiac Catheterization: N/A  Stress Test: N/A  Pulmonary Function Testing: N/A      Assessment & Recommendations:     1. Encounter for preoperative assessment        2. Primary hypertension        3. Chronic diastolic heart failure (HCC)            Pre-Op Evaluation Assessment  78 y.o. female with planned surgery: left TKA. Known risk factors for perioperative complications: Congestive heart failure. Cardiac Risk Estimation: per the Revised Cardiac Risk Index (Circ. 100:1043, 1999), the patient's risk factors for cardiac complications include history of congestive heart failure, putting her in: RCI RISK CLASS II (1 risk factor, risk of major cardiac compl. appr. 1.3%). Current medications which may produce withdrawal symptoms if withheld perioperatively: none. Pre-Op Evaluation Plan  1. Further preoperative workup as follows:   - None; no further preoperative work-up is required    2. Change in medication regimen before surgery:   - Patient to hold all medications except her Metoprolol and her Lisinopril     3. Prophylaxis for cardiac events with perioperative beta-blockers: not indicated. 4. Patient requires further consultation with: None    Clearance  Patient is CLEARED for surgery without any additional cardiac testing.      Daryl Issa DO  [address] [place] [address] [address] [address]  Phone#  [phone]  Fax#  [phone]

## 2023-09-23 DIAGNOSIS — E03.9 HYPOTHYROIDISM, UNSPECIFIED TYPE: ICD-10-CM

## 2023-09-25 RX ORDER — LEVOTHYROXINE SODIUM 0.05 MG/1
50 TABLET ORAL DAILY
Qty: 90 TABLET | Refills: 2 | Status: SHIPPED | OUTPATIENT
Start: 2023-09-25

## 2024-03-20 ENCOUNTER — OFFICE VISIT (OUTPATIENT)
Age: 84
End: 2024-03-20
Payer: MEDICARE

## 2024-03-20 VITALS — WEIGHT: 160 LBS | TEMPERATURE: 98.6 F | HEIGHT: 65 IN | BODY MASS INDEX: 26.66 KG/M2

## 2024-03-20 DIAGNOSIS — D18.01 CHERRY ANGIOMA: ICD-10-CM

## 2024-03-20 DIAGNOSIS — Z85.828 HISTORY OF SKIN CANCER: ICD-10-CM

## 2024-03-20 DIAGNOSIS — L82.1 SEBORRHEIC KERATOSIS: ICD-10-CM

## 2024-03-20 DIAGNOSIS — D22.9 NEVUS: ICD-10-CM

## 2024-03-20 DIAGNOSIS — Z13.89 SCREENING FOR SKIN CONDITION: Primary | ICD-10-CM

## 2024-03-20 PROCEDURE — 99213 OFFICE O/P EST LOW 20 MIN: CPT | Performed by: DERMATOLOGY

## 2024-03-20 NOTE — PATIENT INSTRUCTIONS
"MELANOCYTIC NEVI (\"Moles\")    Melanocytic nevi (\"moles\") are tan or brown, raised or flat areas of the skin which have an increased number of melanocytes. Melanocytes are the cells in our body which make pigment and account for skin color.    Some moles are present at birth (I.e., \"congenital nevi\"), while others come up later in life (i.e., \"acquired nevi\").  The sun can stimulate the body to make more moles.  Sunburns are not the only thing that triggers more moles.  Chronic sun exposure can do it too.     Clinically distinguishing a healthy mole from melanoma may be difficult, even for experienced dermatologists. The \"ABCDE's\" of moles have been suggested as a means of helping to alert a person to a suspicious mole and the possible increased risk of melanoma.  The suggestions for raising alert are as follows:    Asymmetry: Healthy moles tend to be symmetric, while melanomas are often asymmetric.  Asymmetry means if you draw a line through the mole, the two halves do not match in color, size, shape, or surface texture. Asymmetry can be a result of rapid enlargement of a mole, the development of a raised area on a previously flat lesion, scaling, ulceration, bleeding or scabbing within the mole.  Any mole that starts to demonstrate \"asymmetry\" should be examined promptly by a board certified dermatologist.     Border: Healthy moles tend to have discrete, even borders.  The border of a melanoma often blends into the normal skin and does not sharply delineate the mole from normal skin.  Any mole that starts to demonstrate \"uneven borders\" should be examined promptly by a board certified dermatologist.     Color: Healthy moles tend to be one color throughout.  Melanomas tend to be made up of different colors ranging from dark black, blue, white, or red.  Any mole that demonstrates a color change should be examined promptly by a board certified dermatologist.     Diameter: Healthy moles tend to be smaller than 0.6 cm " "in size; an exception are \"congenital nevi\" that can be larger.  Melanomas tend to grow and can often be greater than 0.6 cm (1/4 of an inch, or the size of a pencil eraser). This is only a guideline, and many normal moles may be larger than 0.6 cm without being unhealthy.  Any mole that starts to change in size (small to bigger or bigger to smaller) should be examined promptly by a board certified dermatologist.     Evolving: Healthy moles tend to \"stay the same.\"  Melanomas may often show signs of change or evolution such as a change in size, shape, color, or elevation.  Any mole that starts to itch, bleed, crust, burn, hurt, or ulcerate or demonstrate a change or evolution should be examined promptly by a board certified dermatologist.      Dysplastic Nevi  Dysplastic moles are moles that fit the ABCDE rules of melanoma but are not identified as melanomas when examined under the microscope.  They may indicate an increased risk of melanoma in that person. If there is a family history of melanoma, most experts agree that the person may be at an increased risk for developing a melanoma.  Experts still do not agree on what dysplastic moles mean in patients without a personal or family history of melanoma.  Dysplastic moles are usually larger than common moles and have different colors within it with irregular borders. The appearance can be very similar to a melanoma. Biopsies of dysplastic moles may show abnormalities which are different from a regular mole.      Melanoma  Malignant melanoma is a type of skin cancer that can be deadly if it spreads throughout the body. The incidence of melanoma in the [place] is growing faster than any other cancer. Melanoma usually grows near the surface of the skin for a period of time, and then begins to grow deeper into the skin. Once it grows deeper into the skin, the risk of spread to other organs greatly increases. Therefore, early detection and removal of a malignant " "melanoma may result in a better chance at a complete cure; removal after the tumor has spread may not be as effective, leading to worse clinical outcomes such as death.    The true rate of nevus transformation into a melanoma is unknown. It has been estimated that the lifetime risk for any acquired melanocytic nevus on any 20-year-old individual transforming into melanoma by age 80 is 0.03% (1 in 3,164) for men and 0.009% (1 in 10,800) for women.     The appearance of a \"new mole\" remains one of the most reliable methods for identifying a malignant melanoma.  Occasionally, melanomas appear as rapidly growing, blue-black, dome-shaped bumps within a previous mole or previous area of normal skin.  Other times, melanomas are suspected when a mole suddenly appears or changes. Itching, burning, or pain in a pigmented lesion should increase suspicion, but most patients with early melanoma have no skin discomfort whatsoever.  Melanoma can occur anywhere on the skin, including areas that are difficult for self-examination. Many melanomas are first noticed by other family members.  Suspicious-looking moles may be removed for microscopic examination.       You may be able to prevent death from melanoma by doing two simple things:    Try to avoid unnecessary sun exposure and protect your skin when it is exposed to the sun.  People who live near the equator, people who have intermittent exposures to large amounts of sun, and people who have had sunburns in childhood or adolescence have an increased risk for melanoma. Sun sense and vigilant sun protection may be keys to helping to prevent melanoma.  We recommend wearing UPF-rated sun protective clothing and sunglasses whenever possible and applying a moisturizer-sunscreen combination product (SPF 50+) such as Neutrogena Daily Defense to sun exposed areas of skin at least three times a day.    Have your moles regularly examined by a board certified dermatologist AND by yourself or " "a family member/friend at home.  We recommend that you have your moles examined at least once a year by a board certified dermatologist.  Use your birthday as an annual reminder to have your \"Birthday Suit\" (I.e., your skin) examined; it is a nice birthday gift to yourself to know that your skin is healthy appearing!  Additionally, at-home self examinations may be helpful for detecting a possible melanoma.  Use the ABCDEs we discussed and check your moles once a month at home.        SEBORRHEIC KERATOSIS    A seborrheic keratosis is a harmless warty spot that appears during adult life as a common sign of skin aging.  Seborrheic keratoses can arise on any area of skin, covered or uncovered, with the usual exception of the palms and soles. They do not arise from mucous membranes. Seborrheic keratoses can have highly variable appearance.      Seborrheic keratoses are extremely common. It has been estimated that over 90% of adults over the age of 60 years have one or more of them. They occur in males and females of all races, typically beginning to erupt in the 30s or 40s. They are uncommon under the age of 20 years.  The precise cause of seborrhoeic keratoses is not known.  Seborrhoeic keratoses are considered degenerative in nature. As time goes by, seborrheic keratoses tend to become more numerous. Some people inherit a tendency to develop a very large number of them; some people may have hundreds of them.    The name \"seborrheic keratosis\" is misleading, because these lesions are not limited to a seborrhoeic distribution (scalp, mid-face, chest, upper back), nor are they formed from sebaceous glands, nor are they associated with sebum -- which is greasy.  Seborrheic keratosis may also be called \"SK,\" \"Seb K,\" \"basal cell papilloma,\" \"senile wart,\" or \"barnacle.\"      There is no easy way to remove multiple lesions on a single occasion.  Unless a specific lesion is \"inflamed\" and is causing pain or stinging/burning " "or is bleeding, most insurance companies do not authorize treatment.      ANGIOMA (\"CHERRY ANGIOMA\")    Crowder angiomas markedly increase in number from about the age of 40, so it has been estimated that 75% of people over 75 years of age have them. Although they also called \"senile angiomas,\" they can occur in young people too - 5% of adolescents have been found to have them.     Cherry angiomas are very common in males and females of any age or race, with no difference in sexes or races affected. They are however more noticeable in white skin than in skin of colour.  There may be a family history of similar lesions. Eruptive (very large number appearing in a short period of time) cherry angiomas have been rarely reported to be associated with internal malignancy and pregnancy.   "

## 2024-03-20 NOTE — PROGRESS NOTES
"[facility] Dermatology Clinic Note     Patient Name: Kandis Paiz  Encounter Date: 03/20/2024     Have you been cared for by a [facility] Dermatologist in the last 3 years and, if so, which description applies to you?    Yes.  I have been here within the last 3 years, and my medical history has NOT changed since that time.  I am FEMALE/of child-bearing potential.    REVIEW OF SYSTEMS:  Have you recently had or currently have any of the following? No changes in my recent health.   PAST MEDICAL HISTORY:  Have you personally ever had or currently have any of the following?  If \"YES,\" then please provide more detail. No changes in my medical history.   HISTORY OF IMMUNOSUPPRESSION: Do you have a history of any of the following:  Systemic Immunosuppression such as Diabetes, Biologic or Immunotherapy, Chemotherapy, Organ Transplantation, Bone Marrow Transplantation?  No     Answering \"YES\" requires the addition of the dotphrase \"IMMUNOSUPPRESSED\" as the first diagnosis of the patient's visit.   FAMILY HISTORY:  Any \"first degree relatives\" (parent, brother, sister, or child) with the following?    No changes in my family's known health.   PATIENT EXPERIENCE:    Do you want the Dermatologist to perform a COMPLETE skin exam today including a clinical examination under the \"bra and underwear\" areas?  Yes  If necessary, do we have your permission to call and leave a detailed message on your Preferred Phone number that includes your specific medical information?  Yes      Allergies   Allergen Reactions    Aspirin       Current Outpatient Medications:     atorvastatin (LIPITOR) 20 mg tablet, TAKE 1 TABLET BY MOUTH EVERY DAY, Disp: 90 tablet, Rfl: 2    Calcium-Vitamin D-Vitamin K 500-100-40 MG-UNT-MCG CHEW, Chew, Disp: , Rfl:     estradiol (ESTRACE) 0.1 mg/g vaginal cream, , Disp: , Rfl:     Flaxseed, Linseed, (FLAXSEED OIL) 1000 MG CAPS, Take 1 capsule by mouth daily, Disp: , Rfl:     levothyroxine 50 mcg tablet, TAKE 1 TABLET " BY MOUTH EVERY DAY, Disp: 90 tablet, Rfl: 2    lisinopril (ZESTRIL) 10 mg tablet, TAKE 1 TABLET BY MOUTH EVERY DAY, Disp: 90 tablet, Rfl: 2    metoprolol succinate (TOPROL-XL) 25 mg 24 hr tablet, TAKE 1 TABLET (25 MG TOTAL) BY MOUTH DAILY., Disp: 90 tablet, Rfl: 2    Multiple Vitamins-Minerals (MULTIVITAMIN GUMMIES ADULT) CHEW, Chew, Disp: , Rfl:           Whom besides the patient is providing clinical information about today's encounter?   NO ADDITIONAL HISTORIAN (patient alone provided history)      83 year old female with history of skin cancer presents for a yearly skin exam. Patient reports no concerns.     Physical Exam and Assessment/Plan by Diagnosis:    HISTORY OF BASAL CELL CARCINOMA    Physical Exam:  Anatomic Location Affected:  left anterior shoulder in 2020, left knee in 2019.  Morphological Description of scar:  scars well healed  Suspected Recurrence: No    Additional History of Present Condition:  History of basal cell carcinoma with no sign of recurrence    Assessment and Plan:  Based on a thorough discussion of this condition and the management approach to it (including a comprehensive discussion of the known risks, side effects and potential benefits of treatment), the patient (family) agrees to implement the following specific plan:  Monitor for change  When outside we recommend using a wide brim hat, sunglasses, long sleeve and pants, sunscreen with SPF 30+ with reapplication every 2 hours, or SPF specific clothing      HISTORY OF SQUAMOUS CELL CARCINOMA     Physical Exam:  Anatomic Location Affected:  mid chest and right thigh in 2021, left elbow in 2019.  Morphological Description of Scar:  scars well healed  Suspected Recurrence: no  Regional adenopathy: no    Additional History of Present Condition:  History of squamous cell carcinoma with no sign of recurrence.     Assessment and Plan:  Based on a thorough discussion of this condition and the management approach to it (including a  "comprehensive discussion of the known risks, side effects and potential benefits of treatment), the patient (family) agrees to implement the following specific plan:  Monitor for change  When outside we recommend using a wide brim hat, sunglasses, long sleeve and pants, sunscreen with SPF 30+ with reapplication every 2 hours, or SPF specific clothing        MELANOCYTIC NEVI (\"Moles\")    Physical Exam:  Anatomic Location Affected: Mostly on sun-exposed areas of the body  Morphological Description:  Scattered, 1-4mm round to ovoid, symmetrical-appearing, even bordered, skin colored to dark brown macules/papules, mostly in sun-exposed areas    Additional History of Present Condition:  present on exam.     Assessment and Plan:  Based on a thorough discussion of this condition and the management approach to it (including a comprehensive discussion of the known risks, side effects and potential benefits of treatment), the patient (family) agrees to implement the following specific plan:  Provided handout with information regarding the ABCDE's of moles   Recommend routine skin exams every year   Sun avoidance, protective clothing (known as UPF clothing), and the use of at least SPF 30 sunscreens is advised. Sunscreen should be reapplied every two hours when outside.       SEBORRHEIC KERATOSIS; NON-INFLAMED    Physical Exam:  Anatomic Location Affected:  scattered across trunk, extremities,  face  Morphological Description:  Flat and raised, waxy, smooth to warty textured, yellow to brownish-grey to dark brown to blackish, discrete, \"stuck-on\" appearing papules.    Additional History of Present Condition:  Patient reports new bumps on the skin.  Denies itch, burn, pain, bleeding or ulceration.  Present constantly; nothing seems to make it worse or better.  No prior treatment.      Assessment and Plan:  Based on a thorough discussion of this condition and the management approach to it (including a comprehensive discussion of " "the known risks, side effects and potential benefits of treatment), the patient (family) agrees to implement the following specific plan:  Reassured benign      ANGIOMA (\"CHERRY ANGIOMA\")    Physical Exam:  Anatomic Location: scattered across sun exposed areas of the trunk and extremities   Morphologic Description: Firm red to reddish-blue discrete papules    Additional History of Present Condition:  Present on exam.     Assessment and Plan:  Reassured benign      Scribe Attestation      I,:  Margaux Cardenas am acting as a scribe while in the presence of the attending physician.:       I,:  Thanh Junior MD personally performed the services described in this documentation    as scribed in my presence.:           "

## 2024-04-24 ENCOUNTER — RA CDI HCC (OUTPATIENT)
Dept: OTHER | Facility: HOSPITAL | Age: 84
End: 2024-04-24

## 2024-04-30 ENCOUNTER — OFFICE VISIT (OUTPATIENT)
Dept: FAMILY MEDICINE CLINIC | Facility: CLINIC | Age: 84
End: 2024-04-30
Payer: MEDICARE

## 2024-04-30 VITALS
WEIGHT: 160 LBS | SYSTOLIC BLOOD PRESSURE: 146 MMHG | BODY MASS INDEX: 26.66 KG/M2 | DIASTOLIC BLOOD PRESSURE: 80 MMHG | HEART RATE: 65 BPM | HEIGHT: 65 IN | TEMPERATURE: 97.9 F | OXYGEN SATURATION: 99 %

## 2024-04-30 DIAGNOSIS — Z12.11 COLON CANCER SCREENING: ICD-10-CM

## 2024-04-30 DIAGNOSIS — I50.32 CHRONIC DIASTOLIC HEART FAILURE (HCC): ICD-10-CM

## 2024-04-30 DIAGNOSIS — I10 HYPERTENSION, UNSPECIFIED TYPE: ICD-10-CM

## 2024-04-30 DIAGNOSIS — E03.9 ACQUIRED HYPOTHYROIDISM: ICD-10-CM

## 2024-04-30 DIAGNOSIS — I10 PRIMARY HYPERTENSION: ICD-10-CM

## 2024-04-30 DIAGNOSIS — Z00.00 ENCOUNTER FOR MEDICARE ANNUAL WELLNESS EXAM: Primary | ICD-10-CM

## 2024-04-30 PROCEDURE — G0439 PPPS, SUBSEQ VISIT: HCPCS | Performed by: FAMILY MEDICINE

## 2024-04-30 RX ORDER — LISINOPRIL 20 MG/1
20 TABLET ORAL DAILY
Qty: 90 TABLET | Refills: 0 | Status: SHIPPED | OUTPATIENT
Start: 2024-04-30

## 2024-04-30 NOTE — PROGRESS NOTES
Assessment and Plan:     Problem List Items Addressed This Visit     Chronic diastolic heart failure (HCC)    Relevant Orders    CBC and differential    Comprehensive metabolic panel    Lipid panel    TSH, 3rd generation with Free T4 reflex    Acquired hypothyroidism    Relevant Orders    CBC and differential    Comprehensive metabolic panel    Lipid panel    TSH, 3rd generation with Free T4 reflex    Hypertension    Relevant Medications    lisinopril (ZESTRIL) 20 mg tablet    Other Relevant Orders    CBC and differential    Comprehensive metabolic panel    Lipid panel    TSH, 3rd generation with Free T4 reflex   Other Visit Diagnoses     Encounter for Medicare annual wellness exam    -  Primary    Colon cancer screening        Relevant Orders    Cologuard          Depression Screening and Follow-up Plan: Patient was screened for depression during today's encounter. They screened negative with a PHQ-2 score of 0.    Urinary Incontinence Plan of Care: counseling topics discussed: practice Kegel (pelvic floor strengthening) exercises, use restroom every 2 hours, limiting fluid intake 3-4 hours before bed and preventing constipation. Follows with gynecology and urogyn. .     Preventive health issues were discussed with patient, and age appropriate screening tests were ordered as noted in patient's After Visit Summary.  Personalized health advice and appropriate referrals for health education or preventive services given if needed, as noted in patient's After Visit Summary.     History of Present Illness:     Patient presents for a Medicare Wellness Visit    HPI   Patient Care Team:  Krysta Nolasco DO as PCP - General (Family Medicine)  Adis Tejeda MD as PCP - [facility]  Thanh Junior MD    Patient presents to the office for follow up. States taht she is doing well. Her left knee was replaced by Dr. Frias.     Review of Systems:     Review of Systems     Problem List:     Patient Active Problem List    Diagnosis   • Chronic diastolic heart failure (HCC)   • Stress incontinence of urine   • Chest wall pain   • Acquired hypothyroidism   • Seborrheic keratosis   • History of skin cancer   • Moderate aortic regurgitation   • Atrophic vaginitis   • Overweight   • Hyperlipidemia   • Hypertension      Past Medical and Surgical History:     Past Medical History:   Diagnosis Date   • Basal cell carcinoma    • H/O colonoscopy    • History of nonmelanoma skin cancer     last assessed 11/20/17   • Hyperlipidemia    • Hypertension    • Inflamed seborrheic keratosis    • Overweight with body mass index (BMI) of 27 to 27.9 in adult    • Sciatica    • Uterine fibroid    • Viral warts      Past Surgical History:   Procedure Laterality Date   • MOHS SURGERY  11/08/2012    [place]   • REPLACEMENT TOTAL KNEE Left 10/12/2023   • THYROID SURGERY     • TOTAL ABDOMINAL HYSTERECTOMY        Family History:     Family History   Problem Relation Age of Onset   • Heart failure Mother    • Arthritis Mother    • Hypertension Mother    • Kidney disease Sister    • Cancer Son       Social History:     Social History     Socioeconomic History   • Marital status:      Spouse name: None   • Number of children: None   • Years of education: None   • Highest education level: None   Occupational History   • None   Tobacco Use   • Smoking status: Never   • Smokeless tobacco: Never   Vaping Use   • Vaping status: Never Used   Substance and Sexual Activity   • Alcohol use: Yes     Alcohol/week: 1.0 standard drink of alcohol     Types: 1 Cans of beer per week     Comment: Wine socially   • Drug use: No   • Sexual activity: Never   Other Topics Concern   • None   Social History Narrative    Active advance directive    Drinks coffee    Living independently alone     Social Determinants of Health     Financial Resource Strain: Low Risk  (10/16/2023)    Received [facility]    Overall Financial Resource Strain (CARDIA)     • Difficulty of Paying Living Expenses: Not hard at all   Food Insecurity: No Food Insecurity (4/30/2024)    Hunger Vital Sign    • Worried About Running Out of Food in the Last Year: Never true    • Ran Out of Food in the Last Year: Never true   Transportation Needs: No Transportation Needs (4/30/2024)    PRAPARE - Transportation    • Lack of Transportation (Medical): No    • Lack of Transportation (Non-Medical): No   Physical Activity: Sufficiently Active (12/23/2020)    Exercise Vital Sign    • Days of Exercise per Week: 5 days    • Minutes of Exercise per Session: 60 min   Stress: No Stress Concern Present (12/23/2020)    [facility] - Occupational Stress Questionnaire    • Feeling of Stress : Not at all   Social Connections: Not on file   Intimate Partner Violence: Not At Risk (10/16/2023)    Received from [facility]    Humiliation, Afraid, Rape, and Kick questionnaire    • Fear of Current or Ex-Partner: No    • Emotionally Abused: No    • Physically Abused: No    • Sexually Abused: No   Housing Stability: Low Risk  (4/30/2024)    Housing Stability Vital Sign    • Unable to Pay for Housing in the Last Year: No    • Number of Places Lived in the Last Year: 1    • Unstable Housing in the Last Year: No      Medications and Allergies:     Current Outpatient Medications   Medication Sig Dispense Refill   • atorvastatin (LIPITOR) 20 mg tablet TAKE 1 TABLET BY MOUTH EVERY DAY 90 tablet 2   • Calcium-Vitamin D-Vitamin K 500-100-40 MG-UNT-MCG CHEW Chew     • estradiol (ESTRACE) 0.1 mg/g vaginal cream      • Flaxseed, Linseed, (FLAXSEED OIL) 1000 MG CAPS Take 1 capsule by mouth daily     • levothyroxine 50 mcg tablet TAKE 1 TABLET BY MOUTH EVERY DAY 90 tablet 2   • lisinopril (ZESTRIL) 20 mg tablet Take 1 tablet (20 mg total) by mouth daily 90 tablet 0   • metoprolol succinate (TOPROL-XL) 25 mg 24 hr tablet TAKE 1 TABLET (25 MG TOTAL) BY MOUTH DAILY. 90 tablet 2   •  Multiple Vitamins-Minerals (MULTIVITAMIN GUMMIES ADULT) CHEW Chew       No current facility-administered medications for this visit.     Allergies   Allergen Reactions   • Aspirin       Immunizations:     Immunization History   Administered Date(s) Administered   • COVID-19 PFIZER VACCINE 0.3 ML IM 03/17/2021, 04/07/2021, 01/11/2022   • COVID-19 Pfizer Vac BIVALENT Jack-sucrose 12 Yr+ IM 10/15/2022   • INFLUENZA 09/04/2014, 10/29/2015, 11/09/2016, 10/04/2017, 11/07/2018, 11/10/2021, 10/15/2022   • Influenza Split High Dose Preservative Free IM 10/29/2015, 11/09/2016, 10/04/2017   • Influenza, high dose seasonal 0.7 mL 11/07/2018, 11/11/2019   • Influenza, seasonal, injectable 10/15/2013   • Pneumococcal Conjugate 13-Valent 11/09/2016   • Pneumococcal Polysaccharide PPV23 08/09/2012   • Tdap 10/19/2005   • Zoster 01/28/2013   • Zoster Vaccine Recombinant 10/05/2021, 12/07/2021      Health Maintenance:         Topic Date Due   • Breast Cancer Screening: Mammogram  04/10/2025         Topic Date Due   • Influenza Vaccine (1) 09/01/2023   • COVID-19 Vaccine (5 - 2023-24 season) 09/01/2023      Medicare Screening Tests and Risk Assessments:     Kandis is here for her Subsequent Wellness visit.     Health Risk Assessment:   Patient rates overall health as fair. Patient feels that their physical health rating is same. Patient is satisfied with their life. Eyesight was rated as slightly worse. Hearing was rated as same. Patient feels that their emotional and mental health rating is slightly better. Patients states they are never, rarely angry. Patient states they are sometimes unusually tired/fatigued. Pain experienced in the last 7 days has been some. Patient's pain rating has been 3/10. Patient states that she has experienced no weight loss or gain in last 6 months.     Depression Screening:   PHQ-2 Score: 0      Fall Risk Screening:   In the past year, patient has experienced: history of falling in past year    Injured  during fall?: No    Feels unsteady when standing or walking?: Yes    Worried about falling?: Yes      Urinary Incontinence Screening:   Patient has leaked urine accidently in the last six months.     Home Safety:  Patient does not have trouble with stairs inside or outside of their home. Patient has working smoke alarms and has no working carbon monoxide detector. Home safety hazards include: none.     Nutrition:   Current diet is Regular.     Medications:   Patient is currently taking over-the-counter supplements. OTC medications include: see medication list. Patient is able to manage medications.     Activities of Daily Living (ADLs)/Instrumental Activities of Daily Living (IADLs):   Walk and transfer into and out of bed and chair?: Yes  Dress and groom yourself?: Yes    Bathe or shower yourself?: Yes    Feed yourself? Yes  Do your laundry/housekeeping?: Yes  Manage your money, pay your bills and track your expenses?: Yes  Make your own meals?: Yes    Do your own shopping?: Yes    Previous Hospitalizations:   Any hospitalizations or ED visits within the last 12 months?: Yes    How many hospitalizations have you had in the last year?: 1-2    Hospitalization Comments: For knee surgery    Advance Care Planning:   Living will: Yes    Durable POA for healthcare: Yes    Advanced directive: Yes      PREVENTIVE SCREENINGS      Cardiovascular Screening:    General: Screening Not Indicated and History Lipid Disorder      Diabetes Screening:     General: Screening Current      Colorectal Cancer Screening:     General: Screening Not Indicated      Breast Cancer Screening:     General: Screening Current      Cervical Cancer Screening:    General: Screening Not Indicated      Lung Cancer Screening:     General: Screening Not Indicated    Screening, Brief Intervention, and Referral to Treatment (SBIRT)    Screening  Typical number of drinks in a day: 0  Typical number of drinks in a week: 0  Interpretation: Low risk drinking  "behavior.    Single Item Drug Screening:  How often have you used an illegal drug (including marijuana) or a prescription medication for non-medical reasons in the past year? never    Single Item Drug Screen Score: 0  Interpretation: Negative screen for possible drug use disorder    No results found.     Physical Exam:     /80 (BP Location: Left arm, Patient Position: Sitting, Cuff Size: Standard)   Pulse 65   Temp 97.9 °F (36.6 °C) (Tympanic)   Ht 5' 5\" (1.651 m)   Wt 72.6 kg (160 lb)   LMP  (LMP Unknown)   SpO2 99%   BMI 26.63 kg/m²     Physical Exam  Vitals reviewed.   Constitutional:       General: She is not in acute distress.     Appearance: Normal appearance.   HENT:      Head: Normocephalic and atraumatic.      Right Ear: External ear normal.      Left Ear: External ear normal.      Nose: Nose normal.      Mouth/Throat:      Mouth: Mucous membranes are moist.   Eyes:      Extraocular Movements: Extraocular movements intact.      Conjunctiva/sclera: Conjunctivae normal.      Pupils: Pupils are equal, round, and reactive to light.   Cardiovascular:      Rate and Rhythm: Normal rate and regular rhythm.      Heart sounds: Normal heart sounds.   Pulmonary:      Effort: Pulmonary effort is normal.      Breath sounds: Normal breath sounds.   Abdominal:      General: Bowel sounds are normal. There is no distension.      Palpations: Abdomen is soft.      Tenderness: There is no abdominal tenderness.   Musculoskeletal:      Cervical back: Neck supple.      Right lower leg: No edema.      Left lower leg: No edema.   Lymphadenopathy:      Cervical: No cervical adenopathy.   Skin:     General: Skin is warm.      Capillary Refill: Capillary refill takes less than 2 seconds.      Findings: No rash.   Neurological:      Mental Status: She is alert. Mental status is at baseline.          Krysta Brown, DO  "

## 2024-04-30 NOTE — PATIENT INSTRUCTIONS

## 2024-05-07 LAB
ALBUMIN SERPL-MCNC: 4.3 G/DL (ref 3.6–5.1)
ALBUMIN/GLOB SERPL: 1.5 (CALC) (ref 1–2.5)
ALP SERPL-CCNC: 107 U/L (ref 37–153)
ALT SERPL-CCNC: 16 U/L (ref 6–29)
AST SERPL-CCNC: 24 U/L (ref 10–35)
BASOPHILS # BLD AUTO: 51 CELLS/UL (ref 0–200)
BASOPHILS NFR BLD AUTO: 1 %
BILIRUB SERPL-MCNC: 0.4 MG/DL (ref 0.2–1.2)
BUN SERPL-MCNC: 20 MG/DL (ref 7–25)
BUN/CREAT SERPL: NORMAL (CALC) (ref 6–22)
CALCIUM SERPL-MCNC: 9.8 MG/DL (ref 8.6–10.4)
CHLORIDE SERPL-SCNC: 104 MMOL/L (ref 98–110)
CHOLEST SERPL-MCNC: 172 MG/DL
CHOLEST/HDLC SERPL: 2.5 (CALC)
CO2 SERPL-SCNC: 30 MMOL/L (ref 20–32)
CREAT SERPL-MCNC: 0.74 MG/DL (ref 0.6–0.95)
EOSINOPHIL # BLD AUTO: 107 CELLS/UL (ref 15–500)
EOSINOPHIL NFR BLD AUTO: 2.1 %
ERYTHROCYTE [DISTWIDTH] IN BLOOD BY AUTOMATED COUNT: 14.7 % (ref 11–15)
GFR/BSA.PRED SERPLBLD CYS-BASED-ARV: 79 ML/MIN/1.73M2
GLOBULIN SER CALC-MCNC: 2.9 G/DL (CALC) (ref 1.9–3.7)
GLUCOSE SERPL-MCNC: 98 MG/DL (ref 65–99)
HCT VFR BLD AUTO: 42.2 % (ref 35–45)
HDLC SERPL-MCNC: 68 MG/DL
HGB BLD-MCNC: 14.5 G/DL (ref 11.7–15.5)
LDLC SERPL CALC-MCNC: 82 MG/DL (CALC)
LYMPHOCYTES # BLD AUTO: 2066 CELLS/UL (ref 850–3900)
LYMPHOCYTES NFR BLD AUTO: 40.5 %
MCH RBC QN AUTO: 32.6 PG (ref 27–33)
MCHC RBC AUTO-ENTMCNC: 34.4 G/DL (ref 32–36)
MCV RBC AUTO: 94.8 FL (ref 80–100)
MONOCYTES # BLD AUTO: 576 CELLS/UL (ref 200–950)
MONOCYTES NFR BLD AUTO: 11.3 %
NEUTROPHILS # BLD AUTO: 2300 CELLS/UL (ref 1500–7800)
NEUTROPHILS NFR BLD AUTO: 45.1 %
NONHDLC SERPL-MCNC: 104 MG/DL (CALC)
PLATELET # BLD AUTO: 247 THOUSAND/UL (ref 140–400)
PMV BLD REES-ECKER: 10.7 FL (ref 7.5–12.5)
POTASSIUM SERPL-SCNC: 4.1 MMOL/L (ref 3.5–5.3)
PROT SERPL-MCNC: 7.2 G/DL (ref 6.1–8.1)
RBC # BLD AUTO: 4.45 MILLION/UL (ref 3.8–5.1)
SODIUM SERPL-SCNC: 141 MMOL/L (ref 135–146)
TRIGL SERPL-MCNC: 123 MG/DL
TSH SERPL-ACNC: 1.51 MIU/L (ref 0.4–4.5)
WBC # BLD AUTO: 5.1 THOUSAND/UL (ref 3.8–10.8)

## 2024-05-22 LAB — COLOGUARD RESULT REPORTABLE: NEGATIVE

## 2024-05-25 DIAGNOSIS — I10 HYPERTENSION, UNSPECIFIED TYPE: ICD-10-CM

## 2024-05-25 DIAGNOSIS — E78.5 HYPERLIPIDEMIA, UNSPECIFIED HYPERLIPIDEMIA TYPE: ICD-10-CM

## 2024-05-25 RX ORDER — ATORVASTATIN CALCIUM 20 MG/1
20 TABLET, FILM COATED ORAL DAILY
Qty: 90 TABLET | Refills: 2 | Status: SHIPPED | OUTPATIENT
Start: 2024-05-25

## 2024-05-25 RX ORDER — METOPROLOL SUCCINATE 25 MG/1
25 TABLET, EXTENDED RELEASE ORAL DAILY
Qty: 90 TABLET | Refills: 2 | Status: SHIPPED | OUTPATIENT
Start: 2024-05-25

## 2024-05-25 RX ORDER — LISINOPRIL 20 MG/1
20 TABLET ORAL DAILY
Qty: 90 TABLET | Refills: 0 | Status: SHIPPED | OUTPATIENT
Start: 2024-05-25

## 2024-08-29 DIAGNOSIS — E03.9 HYPOTHYROIDISM, UNSPECIFIED TYPE: ICD-10-CM

## 2024-08-29 RX ORDER — LEVOTHYROXINE SODIUM 50 UG/1
50 TABLET ORAL DAILY
Qty: 90 TABLET | Refills: 1 | Status: SHIPPED | OUTPATIENT
Start: 2024-08-29

## 2024-10-23 ENCOUNTER — RA CDI HCC (OUTPATIENT)
Dept: OTHER | Facility: HOSPITAL | Age: 84
End: 2024-10-23

## 2024-10-29 ENCOUNTER — OFFICE VISIT (OUTPATIENT)
Dept: FAMILY MEDICINE CLINIC | Facility: CLINIC | Age: 84
End: 2024-10-29
Payer: MEDICARE

## 2024-10-29 VITALS
TEMPERATURE: 97.5 F | DIASTOLIC BLOOD PRESSURE: 82 MMHG | WEIGHT: 161.4 LBS | HEART RATE: 65 BPM | OXYGEN SATURATION: 97 % | BODY MASS INDEX: 26.89 KG/M2 | HEIGHT: 65 IN | SYSTOLIC BLOOD PRESSURE: 158 MMHG

## 2024-10-29 DIAGNOSIS — I10 PRIMARY HYPERTENSION: Primary | ICD-10-CM

## 2024-10-29 DIAGNOSIS — I50.32 CHRONIC DIASTOLIC HEART FAILURE (HCC): ICD-10-CM

## 2024-10-29 DIAGNOSIS — R42 VERTIGO: ICD-10-CM

## 2024-10-29 DIAGNOSIS — E03.9 ACQUIRED HYPOTHYROIDISM: ICD-10-CM

## 2024-10-29 PROCEDURE — 99214 OFFICE O/P EST MOD 30 MIN: CPT | Performed by: FAMILY MEDICINE

## 2024-10-29 PROCEDURE — G2211 COMPLEX E/M VISIT ADD ON: HCPCS | Performed by: FAMILY MEDICINE

## 2024-10-29 RX ORDER — LISINOPRIL 40 MG/1
40 TABLET ORAL DAILY
Qty: 90 TABLET | Refills: 0 | Status: SHIPPED | OUTPATIENT
Start: 2024-10-29

## 2024-10-29 RX ORDER — MECLIZINE HCL 12.5 MG 12.5 MG/1
12.5 TABLET ORAL 3 TIMES DAILY PRN
Qty: 30 TABLET | Refills: 0 | Status: SHIPPED | OUTPATIENT
Start: 2024-10-29

## 2024-10-29 NOTE — ASSESSMENT & PLAN NOTE
Wt Readings from Last 3 Encounters:   10/29/24 73.2 kg (161 lb 6.4 oz)   04/30/24 72.6 kg (160 lb)   03/20/24 72.6 kg (160 lb)

## 2024-10-29 NOTE — PROGRESS NOTES
"Ambulatory Visit  Name: Kandis Paiz      : 1940      MRN: [id number]  Encounter Provider: Krysta Nolasco DO  Encounter Date: 10/29/2024   Encounter department: [place] [id number] [address]    Assessment & Plan  Vertigo    Orders:  •  meclizine (ANTIVERT) 12.5 MG tablet; Take 1 tablet (12.5 mg total) by mouth 3 (three) times a day as needed for dizziness  •  Ambulatory Referral to Physical Therapy; Future    Chronic diastolic heart failure (HCC)  Wt Readings from Last 3 Encounters:   10/29/24 73.2 kg (161 lb 6.4 oz)   24 72.6 kg (160 lb)   24 72.6 kg (160 lb)          Acquired hypothyroidism         Primary hypertension    Orders:  •  lisinopril (ZESTRIL) 40 mg tablet; Take 1 tablet (40 mg total) by mouth daily      Depression Screening and Follow-up Plan: Patient was screened for depression during today's encounter. They screened negative with a PHQ-2 score of 0.      History of Present Illness     HPI    Patient presents to the office for follow up. States that she has been taking the increased dose of the lisinopril (20 mg). Reports taht she is checking her BP at home, states that this is \"not far off the chart\". States that she does not recall what the actual numbers have been.     States that she has been having an issue with her balance for years. States that she feels like this is worsening. She is using a cane. Denies dizziness at rest. Notes that she can bend and pick things up without an issues. States that she has issues when she closes her eyes or lays her head back.       Review of Systems      Objective     /82   Pulse 65   Temp 97.5 °F (36.4 °C) (Tympanic)   Ht 5' 5\" (1.651 m)   Wt 73.2 kg (161 lb 6.4 oz)   LMP  (LMP Unknown)   SpO2 97%   BMI 26.86 kg/m²     Physical Exam  Vitals reviewed.   Constitutional:       General: She is not in acute distress.     Appearance: Normal appearance.   HENT:      Head: Normocephalic and atraumatic.    "   Right Ear: External ear normal.      Left Ear: External ear normal.      Nose: Nose normal.      Mouth/Throat:      Mouth: Mucous membranes are moist.   Eyes:      Extraocular Movements: Extraocular movements intact.      Conjunctiva/sclera: Conjunctivae normal.      Pupils: Pupils are equal, round, and reactive to light.   Cardiovascular:      Rate and Rhythm: Normal rate and regular rhythm.      Heart sounds: Normal heart sounds.   Pulmonary:      Effort: Pulmonary effort is normal.      Breath sounds: Normal breath sounds.   Abdominal:      General: Bowel sounds are normal. There is no distension.      Palpations: Abdomen is soft.      Tenderness: There is no abdominal tenderness.   Musculoskeletal:      Cervical back: Neck supple.      Right lower leg: No edema.      Left lower leg: No edema.   Lymphadenopathy:      Cervical: No cervical adenopathy.   Skin:     General: Skin is warm.      Capillary Refill: Capillary refill takes less than 2 seconds.      Findings: No rash.   Neurological:      Mental Status: She is alert. Mental status is at baseline.           Krysta Nolasco DO  [facility]

## 2024-11-07 ENCOUNTER — EVALUATION (OUTPATIENT)
Age: 84
End: 2024-11-07
Payer: MEDICARE

## 2024-11-07 DIAGNOSIS — R26.89 IMPAIRMENT OF BALANCE: ICD-10-CM

## 2024-11-07 DIAGNOSIS — R42 VERTIGO: Primary | ICD-10-CM

## 2024-11-07 PROCEDURE — 97530 THERAPEUTIC ACTIVITIES: CPT

## 2024-11-07 PROCEDURE — 97162 PT EVAL MOD COMPLEX 30 MIN: CPT

## 2024-11-07 NOTE — PROGRESS NOTES
"                                                                    PT Evaluation          POC expires Unit limit Auth Expiration date PT/OT + Visit Limit? Co-Insurance   25 N/a pend BOMN Yes                               Visit/Unit Tracking  AUTH Status:  Date               pend Used 1               Remaining                           Today's date: 2024  Patient name: Kandis Paiz  : 1940  MRN: [id number]  Referring provider: Krysta Nolasco DO  Dx:   Encounter Diagnosis     ICD-10-CM    1. Vertigo  R42 Ambulatory Referral to Physical Therapy      2. Impairment of balance  R26.89             Assessment  Assessment details: Patient is a 84 y.o. Female who presents to skilled outpatient PT with complaints of impairments of functional transfers, mobility, and balance, and infrequent dizziness (2x/year) lasting 24 hours which has negatively influenced her level of function, confidence with mobility, and quality of life. Patient reports significant history of falls (2x in the past year) with injury and inability to IND perform STS without UE and floor <>stand transfers. Abnormal coordination per finger to nose, dysdiadochokinesia, and alternating toe taps on L but normal on R. Provided red flag questions including any hx of numbness, tingling, weakness, visual/hearing changes, or cognitive changes; patient denied any history of symptoms. Advised patient to communicate with referring provider regarding dizziness, descriptors: \"constant pulling backwards\", frequency and duration, and symptoms of \"feeling unlike herself\" for the remainder of the day. Edu on peripheral vs central causes of dizziness. Advised patient to seek higher level medical care if episode reoccurs. Understanding verbalized. In upcoming session will communicate on recommendations and implications for neurologist. Abnormal smooth pursuits with difficulty maintaining focus and saccadic movements. All other VOMS WFL. For sit to stand " transfer without UE assist, patient required Yayo for anterior WS and lift assist. She demonstrates insufficient anterior WS; provided PT demo and verbal explanation on biomechanical advantage and need for appropriate anterior weight shift with patient verbalizing understanding. Improvements noted with inc concentric/eccentric control, but continued to require assistance. Her score on the following OM were below age- or gender- compared normative values including 5xSTS, TUG, and mCTSIB. She has significant fear of falling with high reliance on visual system and UE assist. Considered at HIGH risk of falls per TUG and mCTSIB. Provided basic HEP of smooth pursuits and STS (2 x 10 with no UE 3s ecc control). Understanding verbalized. Due to time constraints, will peform AMARO, FGA, 10mWT, and 6MWT and an updated provide basic HEP. Patient is a good candidate to receive OPPT services to improve her balance and transfers in order to maximize her safety, reduce her risk of falls, and enhance her QoL. Has had recent HTN medication change; is currently logging and documenting at home; WILL TAKE PRIOR TO PT.    Outcome Measures Initial Eval  11/7   STS Yayo for anterior WS and lift assist   5xSTS 22.23 sec with 1UE   TUG  - Regular     17.26 sec no AD   10 meter NV   AMARO NV   FGA NV   mCTSIB  - FTEO (firm)  - FTEC (firm)  - FTEO (foam)  - FTEC (foam)   30 sec  3.51 sec + LOB  Defer  Defer     6MWT NV       Impairments: Abnormal coordination, Abnormal gait, Abnormal muscle tone, Abnormal or restricted ROM, Activity intolerance, Impaired balance, Impaired physical strength, Lacks appropriate HEP, Poor posture, Poor body mechanics, Pain with function, Safety issue, Weight-bearing intolerance, Abnormal movement, Difficulty understanding, Abnormal muscle firing  Understanding of Dx/Px/POC: Good  Prognosis: Good    Patient verbalized understanding of POC.         Please contact me if you have any questions or recommendations. Thank  you for the referral and the opportunity to share in Kandis Paiz's care.        Plan  Plan details:   Patient would benefit from: Skilled PT  Planned modality interventions: Biofeedback, Cryotherapy, TENS, Thermotherapy  Planned therapy interventions: Abdominal trunk stabilization, ADL training, Balance, Balance/WB training, Breathing training, Body mechanics training, Coordination, Functional ROM exercises, Gait training, HEP, Joint Mobilization, Manual Therapy, Gomez taping, Motor coordination training, Neuromuscular re-education, Patient education, Postural training, Strengthening, Stretching, Therapeutic activities, Therapeutic exercises, Therapeutic training, Transfer training, Activity modification, Work reintegration  Frequency: 1-3x/wk  Duration in weeks: 12 weeks  Plan of Care beginning date: 11/7/24  Plan of Care expiration date: 3 months - 2/7/2025  Treatment plan discussed with: Patient       Goals  Short Term Goals (4 weeks):    - Patient will improve time on TUG by 2.9 seconds to facilitate improved safety in all ambulation  - Patient will be independent in basic HEP 2-3 weeks  - Patient will improve 5xSTS score by 2.3 seconds to promote improved LE functional strength needed for ADLs  - Patient will complete components of HiMAT to promote agility necessary for sports related tasks    Long Term Goals (12 weeks):  - Patient will be independent in a comprehensive home exercise program  - Patient will improve scoring on DGI by 2.6 points to progress safety  - Patient will improve gait speed by 0.18 m/s to improve safety with community ambulation  - Patient will improve AMARO by 6 points in order to improve static balance and reduce risk for falls  - Patient will improve scoring on FGA by 4 points to progress safety with dynamic tasks  - Patient will be able to demonstrate HT in gait without veering  - Patient will improve 6 Minute Walk Test score by 190 feet to promote improved cardiovascular  endurance  - Patient will report 50% reduction in near falls in order to improve safety with functional tasks and reduce his risk for falls  - Patient will report going on walks at least 3 days per week to promote independence and improved cardiovascular endurance  - Patient will be able to ascend/descend stairs reciprocally with 1 UE assist to promote independence and safety with ADLs  - Patient will report 50% reduction in near falls when ambulating on uneven terrain      Cut off score    All date taken from APTA Neuro Section or Rehab Measures      Childs/56  MDC: 6 pts  Age Norms:  60-69: M - 55   F - 55  70-79: M - 54   F - 53  80-89: M - 53   F - 50 5xSTS: Katya et al 2010  MDC: 2.3 sec  Age Norms:  60-69: 11.4 sec  70-79: 12.6 sec  80-89: 14.8 sec   TUG  MDC: 4.14 sec  Cut off score:  >13.5 sec community dwelling adults  >32.2 frail elderly  <20 I for basic transfers  >30 dependent on transfers 10 Meter Walk Test: Eva et al 2011  MDC: 0.18 m/s  20-29: M - 1.35 m   F - 1.34 m  30-39: M - 1.43 m   F - 1.34 m  40-49: M - 1.43 m   F - 1.39 m  50-59: M - 1.43 m   F - 1.31 m  60-69: M - 1.34 m   F - 1.24 m  70-79: M - 1.26 m   F - 1.13 m  80-89: M - 0.97 m   F - 0.94 m    Household Ambulator < 0.4 m/s  Limited Community Ambulator 0.4 - 0.8 m/s  Community Ambulator 0.8 - 1.2 m/s  Safely cross the street > 1.2 m/s   FGA  MCID: 4 pts  Geriatrics/community < 22/30 fall risk  Geriatrics/community < 20/30 unexplained falls    DGI  MDC: vestibular - 4 pts  MDC: geriatric/community - 3 pts  Falls risk <19/24 mCTSIB  Norm: 20-60 yrs  Eyes open firm: norm sway 0.21-0.48  Eyes closed firm: norm sway 0.48-0.99  Eyes open foam: norm sway 0.38-0.71  Eyes closed foam: norm sway 0.70-2.22   6 Minute Walk Test  MDC: 190.98 ft  MCID: 164 ft    Age Norms  60-69: M - 1876 ft (571.80 m)  F - 1765 ft (537.98 m)  70-79: M - 1729 ft (527.00 m)  F - 1545 ft (470.92 m)  80-89: M - 1368 ft (416.97 m)  F - 1286 ft (391.97 m)  ABC: Jennifer & Corie, 2003  <67% increased risk for falls   [place] Monofilaments  Evaluator Size:        Force (grams):          Hand/Dorsal Thresholds:        Plantar Thresholds:  - 1.65                       - 0.008                       - Normal                                 - Normal  - 2.36                       - 0.02                         - Normal                                 - Normal  - 2.44                       - 0.04                         - Normal                                 - Normal  - 2.83                       - 0.07                         - Normal                                 - Normal  - 3.22                       - 0.16                         - Diminished light touch          - Normal  - 3.61                       - 0.40                         - Diminished light touch          - Normal  - 3.84                       - 0.60                         - Diminished protective           - Diminished light touch  - 4.08                       - 1.00                         - Diminished protective           - Diminished light touch  - 4.17                       - 1.40                         - Diminished protective           - Diminished light touch  - 4.31                       - 2.00                         - Diminished protective           - Diminished light touch  - 4.56                       - 4.00                         - Loss of protective sense      - Diminished protective  - 4.74                       - 6.00                         - Loss of protective sense      - Diminished protective  - 4.93                       - 8.00                         - Loss of protective sense      - Diminished protective  - 5.07                       - 10.0                         - Loss of protective sense     - Loss of protective sense  - 5.18                       - 15.0                         - Loss of protective sense     - Loss of protective sense  - 5.46                       - 26.0                          - Loss of protective sense     - Loss of protective sense  - 5.88                       - 60.0                         - Loss of protective sense     - Loss of protective sense  - 6.10                       - 100                          - Loss of protective sense     - Loss of protective sense  - 6.45                       - 180                          - Loss of protective sense     - Loss of protective sense  - 6.65                       - 300                          - Deep pressure sense only  - Deep pressure sense only         Subjective    History of Present Illness  - Mechanism of injury: Patient arrives with complaints of imbalance and infrequent episodes of dizziness (2x/year). Not caused by positional changes (denied rolling in bed, lying <> sit <> stand). Reports of wave of dizziness, with feeling of falling backwards, and feels unlike herself for 24 hours. Unable to perform STS transfer IND without UE assist and requires assistance for floor transfer. Reports 2 falls in past year with 1 injury. Currently   - Primary AD: none for household amb, SPC for community amb (uneven terrain, long distances)  - Assist level at home: IND  - Decreased fine motor tasks: No    Patient goal:   Improve balance  Improve confidence in walking    Pain  - Current pain ratin/10  - At best pain ratin/10  - At worst pain ratin/10  - Location: L knee (TKR performed 1 year ago)  - Aggravating factors: bending     Social Support  - Steps to enter house: 3 RANDI with L HR  - Stairs in house: FF to cellar with B HR   - Lives in: single level with basement  - Lives with: alone; sons live > 30 min away    - Employment status: retired, [employer] market employee  - Hand dominance: R-handed    Treatments  - Previous treatment: ortho PT  - Current treatment: fam med  - Diagnostic Testing: none      Objective     LE MMT  - R Hip Flexion: 3+/5   L Hip Flexion: 3+/5  - R Hip Extension: 4-/5  L Hip Extension:  "4-/5  - R Hip Abduction: 4/5   L Hip Abduction: 4/5  - R Hip Adduction: 4/5   L Hip Adduction: 4/5  - R Knee Extension: 4/5  L Knee Extension: 4/5  - R Knee Flexion: 4/5   L Knee Flexion: 4/5  - R Ankle DF: 4/5   L Ankle DF: 4/5  - R Ankle PF: 4/5   L Ankle PF: 4/5    Sensation  - Light touch: WFL (reduced anteriorly, distal to L knee following TKR to mid lower leg)  - Deep pressure: WFL    Coordination  - Heel to Shin: WFL  - Alternate Toe Taps: abnormal L  - Finger to Nose: abnormal L, dysmetric  - Dysdiadochokinesia: abnormal L    Reflexes/Clonus  - Clonus: None  - Patellar DTR: 2+: Normal BL    OT Screen  - Difficulty w/ clothing fasteners: No  - Difficulty w/ bathing: No  - Difficulty w/ dressing: No  - Difficulty w/ toileting: No    Gait  - Abnormalities: NV    Vestibular Objective  Cervical Spine AROM:  - WFL, no pain all movements    Oculomotor Screen  - Baseline Symptoms: 0/10  - Gaze Holding Nystagmus: H: Normal   - Spontaneous Nystagmus Room Light: H: Normal   - Smooth Pursuits (central): H: Abnormal Dizziness: 0/10, Observation: difficulty maintaining fixation; saccadic movements H/V  - Saccades (central): H: Normal   - Near Point Convergence (normal: < 4\"/10 cm - central): H: Normal  - VORx1: H: Normal Dizziness: 0/10; denied dizziness       Outcome Measures Initial Eval  11/7        STS Yrn for anterior WS and lift assist        5xSTS 22.23 sec with 1UE        TUG  - Regular     17.26 sec no AD        10 meter NV        AMARO NV        FGA NV        mCTSIB  - FTEO (firm)  - FTEC (firm)  - FTEO (foam)  - FTEC (foam)   30 sec  3.51 sec + LOB  Defer  Defer          6MWT NV        ABC 50%                            Precautions: hx cancer, HTN (new med change; check BP), L knee pain (TKR 1 year ago)  Past Medical History:   Diagnosis Date    Basal cell carcinoma     H/O colonoscopy     History of nonmelanoma skin cancer     last assessed 11/20/17    Hyperlipidemia     Hypertension     Inflamed seborrheic " keratosis     Overweight with body mass index (BMI) of 27 to 27.9 in adult     Sciatica     Uterine fibroid     Viral warts

## 2024-11-11 ENCOUNTER — OFFICE VISIT (OUTPATIENT)
Age: 84
End: 2024-11-11
Payer: MEDICARE

## 2024-11-11 DIAGNOSIS — R26.89 IMPAIRMENT OF BALANCE: ICD-10-CM

## 2024-11-11 DIAGNOSIS — R42 VERTIGO: Primary | ICD-10-CM

## 2024-11-11 PROCEDURE — 97112 NEUROMUSCULAR REEDUCATION: CPT

## 2024-11-11 NOTE — PROGRESS NOTES
"Daily Note     Today's date: 2024  Patient name: Kandis Paiz  : 1940  MRN: [id number]  Referring provider: Krysta Nolasco DO  Dx:   Encounter Diagnosis     ICD-10-CM    1. Vertigo  R42       2. Impairment of balance  R26.89           Start Time: 1415  Stop Time: 1500  Total time in clinic (min): 45 minutes    Subjective: Patient reports no new complaints.      Objective: See treatment diary below    Neuro:    //bars Fwd/bwd walking warm up x 4 laps  STS w/Airex 10x No UE  Side stepping x 3 laps LOB  HKM with B/L arm swing x 4 laps   4\" step up Fwd/Lat 10x ea 1 UE  4\" step taps 20x ea LOB  On airex  FA/EO/EC 30\" 2x ea  HR/TR 20x ea  Semi tandem x 3 laps UE support to No UE support  Standing FA cross body reaches at different height 20x   Low hurdles 1 UE support Fwd x 4 laps 2 1 UE/2 0 UE support              Assessment: Tolerated treatment well.   Patient participated in skilled PT session focused on balance, gait, and vestibular dysfunction.  Patient able to complete exercise program with no issues.  Patient demonstrates LOB with SLS activities as well as on compliant surfaces needing CGA to correct.  Increased swaying on airex with EO/EC needing CGA.  Patient would continue to benefit from skilled PT interventions to address deficits with balance, gait and vestibular dysfunction.. Patient demonstrated fatigue post treatment      Plan: Continue per plan of care.      POC expires Unit limit Auth Expiration date PT/OT + Visit Limit? Co-Insurance   25 N/a pend BOMN Yes                               Visit/Unit Tracking  AUTH Status:  Date              pend Used 1 2              Remaining                         "

## 2024-11-15 ENCOUNTER — OFFICE VISIT (OUTPATIENT)
Age: 84
End: 2024-11-15
Payer: MEDICARE

## 2024-11-15 DIAGNOSIS — R26.89 IMPAIRMENT OF BALANCE: ICD-10-CM

## 2024-11-15 DIAGNOSIS — R42 VERTIGO: Primary | ICD-10-CM

## 2024-11-15 PROCEDURE — 97530 THERAPEUTIC ACTIVITIES: CPT

## 2024-11-15 PROCEDURE — 97112 NEUROMUSCULAR REEDUCATION: CPT

## 2024-11-15 NOTE — PROGRESS NOTES
Daily Note     Today's date: 11/15/2024  Patient name: Kandis Paiz  : 1940  MRN: [id number]  Referring provider: Krysta Nolasco DO  Dx:   Encounter Diagnosis     ICD-10-CM    1. Vertigo  R42       2. Impairment of balance  R26.89                      Subjective: Patient denies falls but reports difficulties with her balance.      Objective: See treatment diary below    Pre-treatment vitals  BP: 150/78 seated L arm, manual reading    Outcome Measures Initial Eval     STS Yrn for anterior WS and lift assist   5xSTS 22.23 sec with 1UE   TUG  - Regular     17.26 sec no AD   10 meter 0.80 m/s   AMARO 34/56   FGA defer   mCTSIB  - FTEO (firm)  - FTEC (firm)  - FTEO (foam)  - FTEC (foam)   30 sec  3.51 sec + LOB  Defer  Defer     6MWT 680 ft no AD     TA  Patient edu   Edu on red flag findings from previous session; communicate with PCP   Possible implications for neurologist   Basic HEP -      Access Code: [email or web address]  URL: [email or web address]  Date: 11/15/2024  Prepared by: Lizzie Hidalgo    Exercises  - Proper Sit to Stand Technique  - 1 x daily - 5 x weekly - 2 sets - 10 reps  - Standing Hip Abduction with Counter Support  - 1 x daily - 5 x weekly - 2 sets - 10 reps - 3 sec hold  - Standing Hip Extension with Counter Support  - 1 x daily - 5 x weekly - 2 sets - 10 reps - 3 sec hold  - Standing March with Counter Support  - 1 x daily - 5 x weekly - 2 sets - 10 reps - 3 sec hold  - Heel Raises with Counter Support  - 1 x daily - 5 x weekly - 2 sets - 10 reps - 3 sec hold      NMR:  STS with foam pad (1) and no UE assist, 2 x 10, cue for 3s ecc control      Assessment: Tolerated treatment well.   Patient participated in skilled PT session focused on completion of assessment. Scores on 10mWT, 6MWT, and AMARO are below age and gender compared normative values. Considered at INCREASED risk of falls per AMARO and 10mWT. Updated basic HEP with patient performing activities with therapist, education  on purpose, handout with explanation, and cues for proper form. Understanding verbalized and patient motivated. Educated patient on red flag findings from evaluation, to communicate with PCP, and possible implications for neurology. Pt verbalized understanding. Patient would continue to benefit from skilled PT interventions to address deficits with balance, gait and vestibular dysfunction.. Patient demonstrated fatigue post treatment      Plan: Continue per plan of care.      POC expires Unit limit Auth Expiration date PT/OT + Visit Limit? Co-Insurance   2/7/25 N/a pend BOMN Yes                               Visit/Unit Tracking  11/7 11/11 11/15

## 2024-11-18 ENCOUNTER — OFFICE VISIT (OUTPATIENT)
Age: 84
End: 2024-11-18
Payer: MEDICARE

## 2024-11-18 DIAGNOSIS — R26.89 IMPAIRMENT OF BALANCE: ICD-10-CM

## 2024-11-18 DIAGNOSIS — R42 VERTIGO: Primary | ICD-10-CM

## 2024-11-18 PROCEDURE — 97112 NEUROMUSCULAR REEDUCATION: CPT

## 2024-11-18 NOTE — PROGRESS NOTES
"Daily Note     Today's date: 2024  Patient name: Kandis Paiz  : 1940  MRN: [id number]  Referring provider: Krysta Nolasco DO  Dx:   Encounter Diagnosis     ICD-10-CM    1. Vertigo  R42       2. Impairment of balance  R26.89                      Subjective: Patient denies falls but reports inc difficulties with her balance: \"I feel out of it today.\" Shares it could be due to fatigue and soreness.      Objective: See treatment diary below    Pre-treatment vitals  BP: 152/76 seated L arm, manual reading    NMR:  STS with foam pad (1) and no UE assist, 2 x 10, cue for 3s ecc control  Fwd/bwd amb 4 laps  EC fwd amb 3 laps  Side stepping 3 laps   HKM with 3s iso, cue for glute med cx 4 laps, 0-1 UE assist  Semi-tandem amb 4 laps, 0-1 UE assist  Fwd/lat 5 x 6\" hurdles with 1-2UE assist 3 cycles ea   + LOB    Assessment: Tolerated treatment well.   Patient participated in skilled PT session focused on enhancing gait / balance. Noted difficulties in SL and narrow TATIANA req CG via GB throughout. Edu on ankle, hip, and stepping reactions and reduced reliance on these righting reactions with UE support on rail. Patient verbalized understanding. Will continue to progress NV. Patient would continue to benefit from skilled PT interventions to address deficits with balance, gait and vestibular dysfunction. Patient demonstrated fatigue post treatment      Plan: Continue per plan of care.      POC expires Unit limit Auth Expiration date PT/OT + Visit Limit? Co-Insurance   25 N/a pend BOMN Yes                               Visit/Unit Tracking  11/7 11/11 11/15 11/18                                                "
yes

## 2024-11-25 ENCOUNTER — OFFICE VISIT (OUTPATIENT)
Age: 84
End: 2024-11-25
Payer: MEDICARE

## 2024-11-25 DIAGNOSIS — R26.89 IMPAIRMENT OF BALANCE: ICD-10-CM

## 2024-11-25 DIAGNOSIS — R42 VERTIGO: Primary | ICD-10-CM

## 2024-11-25 PROCEDURE — 97110 THERAPEUTIC EXERCISES: CPT

## 2024-11-25 PROCEDURE — 97112 NEUROMUSCULAR REEDUCATION: CPT

## 2024-11-25 NOTE — PROGRESS NOTES
"Daily Note     Today's date: 2024  Patient name: Kandis Paiz  : 1940  MRN: [id number]  Referring provider: Krysta Nolasco DO  Dx:   Encounter Diagnosis     ICD-10-CM    1. Vertigo  R42       2. Impairment of balance  R26.89                      Subjective: Patient denied falls. Reports moderate L knee pain: \"I can't step much on it.\" Described it as aching at anteriolateral aspect of L knee and approx 3 in distally. \"Felt like it was going to give out.\" Pain with and without weight bearing (eg inc pain with L knee flexion).    PT advised patient to communicate with PCP and educated on implications for ortho PT and/or follow-up with orthopaedic.       Objective: See treatment diary below    TA  Nu-step 10 min, lvl 1  Seat 8, UE 9    NMR:  STS with foam pad (1) and no UE assist, 2 x 10, cue for 3s ecc control - NP today (deferred 2/2 knee pain)  Fwd/bwd amb 4 laps  Side stepping 4 laps   HKM with 3s iso, cue for glute med cx 4 laps, 0-1 UE assist  Semi-tandem amb 4 laps, 0-1 UE assist  EC fwd amb 3 laps  Amb with HHT 3 laps   Imbalance  Fwd/lat 5 x 6\" hurdles with 1-2UE assist 3 cycles ea     Assessment: Tolerated treatment well.   Patient participated in skilled PT session focused on enhancing gait / balance. Noted difficulties in SL and narrow TATIANA req CG via GB throughout. Due to inc L knee pain/discomfort, modified activities with patient demonstrating good response to modifications. Patient would continue to benefit from skilled PT interventions to address deficits with balance, gait and vestibular dysfunction. Patient demonstrated fatigue post treatment      Plan: Continue per plan of care.      POC expires Unit limit Auth Expiration date PT/OT + Visit Limit? Co-Insurance   25 N/a pend BOMN Yes                               Visit/Unit Tracking  11/7 11/11 11/15 11/18 11/25                                               " Unable to close encounter.

## 2024-12-03 ENCOUNTER — OFFICE VISIT (OUTPATIENT)
Age: 84
End: 2024-12-03
Payer: MEDICARE

## 2024-12-03 DIAGNOSIS — R42 VERTIGO: Primary | ICD-10-CM

## 2024-12-03 DIAGNOSIS — R26.89 IMPAIRMENT OF BALANCE: ICD-10-CM

## 2024-12-03 PROCEDURE — 97110 THERAPEUTIC EXERCISES: CPT

## 2024-12-03 PROCEDURE — 97112 NEUROMUSCULAR REEDUCATION: CPT

## 2024-12-03 NOTE — PROGRESS NOTES
"Daily Note     Today's date: 12/3/2024  Patient name: Kandis Paiz  : 1940  MRN: [id number]  Referring provider: Krysta Nolasco DO  Dx:   Encounter Diagnosis     ICD-10-CM    1. Vertigo  R42       2. Impairment of balance  R26.89                      Subjective: Patient denied falls. Reports reduced L knee pain, but continues to have pain.      Objective: See treatment diary below    TA  Nu-step 8 min, lvl 2  Seat 9, UE 9    NMR:  STS with foam pad (1) and no UE assist, 2 x 10, cue for 3s ecc control - NP today (deferred 2/2 knee pain)  Fwd/bwd amb 4 laps  Side stepping 4 laps   HKM with 3s iso, cue for glute med cx 4 laps, 0-1 UE assist  Lat curb step up 2x10 with focus on ecc control, to reduce Trendelenburg (R>L)  Semi-tandem amb 4 laps, 0-1 UE assist  Amb with HHT 3 laps  Fwd/bwd/lat 5 x 6\" hurdles with 1-2UE assist 3 cycles ea     Assessment: Tolerated treatment well.   Patient participated in skilled PT session focused on enhancing gait / balance. Noted difficulties in SL and narrow TATIANA req CG via GB throughout. Due to L knee pain/discomfort, modified activities with patient demonstrating good response to modifications. Patient would continue to benefit from skilled PT interventions to address deficits with balance, gait and vestibular dysfunction. Patient demonstrated fatigue post treatment      Plan: Continue per plan of care.      POC expires Unit limit Auth Expiration date PT/OT + Visit Limit? Co-Insurance   25 N/a pend BOMN Yes                               Visit/Unit Tracking  11/7 11/11 11/15 11/18 11/25 12/3                                              "

## 2024-12-05 ENCOUNTER — OFFICE VISIT (OUTPATIENT)
Age: 84
End: 2024-12-05
Payer: MEDICARE

## 2024-12-05 DIAGNOSIS — R26.89 IMPAIRMENT OF BALANCE: ICD-10-CM

## 2024-12-05 DIAGNOSIS — R42 VERTIGO: Primary | ICD-10-CM

## 2024-12-05 PROCEDURE — 97112 NEUROMUSCULAR REEDUCATION: CPT

## 2024-12-05 NOTE — PROGRESS NOTES
"Daily Note     Today's date: 2024  Patient name: Kandis Paiz  : 1940  MRN: [id number]  Referring provider: Krysta Nolasco DO  Dx:   Encounter Diagnosis     ICD-10-CM    1. Vertigo  R42       2. Impairment of balance  R26.89                      Subjective: Patient denied falls. Reports reduced L knee pain, but continues to have pain.      Objective: See treatment diary below    TA  Nu-step 8 min, lvl 2  Seat 9, UE 9    NMR:  STS with foam pad (1) and no UE assist, 2 x 10, cue for 3s ecc control - NP today (deferred 2/2 knee pain)  Fwd/bwd amb 4 laps  Side stepping 4 laps   HKM with 3s iso, cue for glute med cx 4 laps, 0-1 UE assist  Lat curb step up 2x10 with focus on ecc control, to reduce Trendelenburg (R>L)  Semi-tandem amb 4 laps, 0-1 UE assist  Amb with HHT 3 laps  Fwd/bwd/lat 5 x 6\" hurdles with 1-2UE assist 3 cycles ea     Assessment: Tolerated treatment well.  Patient participated in skilled PT session focused on enhancing gait / balance. Patient was unable to perform full tandem walking due to multiple losses of balance. Patient did well with forward backward walking but does require cues when to stop.  Patient would continue to benefit from skilled PT interventions to address deficits with balance, gait and vestibular dysfunction. Patient demonstrated fatigue post treatment      Plan: Continue per plan of care.      POC expires Unit limit Auth Expiration date PT/OT + Visit Limit? Co-Insurance   25 N/a pend BOMN Yes                               Visit/Unit Tracking  11/7 11/11 11/15 11/18 11/25 12/3                                              "

## 2024-12-10 ENCOUNTER — EVALUATION (OUTPATIENT)
Age: 84
End: 2024-12-10
Payer: MEDICARE

## 2024-12-10 DIAGNOSIS — R42 VERTIGO: Primary | ICD-10-CM

## 2024-12-10 DIAGNOSIS — R26.89 IMPAIRMENT OF BALANCE: ICD-10-CM

## 2024-12-10 PROCEDURE — 97112 NEUROMUSCULAR REEDUCATION: CPT

## 2024-12-10 PROCEDURE — 97530 THERAPEUTIC ACTIVITIES: CPT

## 2024-12-10 NOTE — PROGRESS NOTES
"                                                                    PT Re-Evaluation / Progress Note         POC expires Unit limit Auth Expiration date PT/OT + Visit Limit? Co-Insurance   25 N/a pend BOMN Yes                               Visit/Unit Tracking  11/7 11/11 11/15 11/18 11/25 12/3 12/5 12/10 PN                                          Today's date: 12/10/2024  Patient name: Kandis Paiz  : 1940  MRN: [id number]  Referring provider: Krysta Nolasco DO  Dx:   Encounter Diagnosis     ICD-10-CM    1. Vertigo  R42       2. Impairment of balance  R26.89             Assessment  Assessment details: Patient is a 84 y.o. Female who presents to skilled outpatient PT with complaints of impairments of functional transfers, mobility, and balance, and infrequent dizziness (2x/year) lasting 24 hours which has negatively influenced her level of function, confidence with mobility, and quality of life. Patient reports significant history of falls (2x in the past year) with injury and inability to IND perform STS without UE and floor <>stand transfers. Abnormal coordination per finger to nose, dysdiadochokinesia, and alternating toe taps on L but normal on R. Provided red flag questions including any hx of numbness, tingling, weakness, visual/hearing changes, or cognitive changes; patient denied any history of symptoms. Advised patient to communicate with referring provider regarding dizziness, descriptors: \"constant pulling backwards\", frequency and duration, and symptoms of \"feeling unlike herself\" for the remainder of the day. Edu on peripheral vs central causes of dizziness. Advised patient to seek higher level medical care if episode reoccurs. Understanding verbalized. Continued to recommend and educate on implications for neurology. Abnormal smooth pursuits with difficulty maintaining focus and saccadic movements. All other VOMS WFL. For sit to stand transfer without UE assist, patient required Yayo for " anterior WS and lift assist. She demonstrates insufficient anterior WS; provided PT demo and verbal explanation on biomechanical advantage and need for appropriate anterior weight shift with patient verbalizing understanding. Improvements noted with inc concentric/eccentric control, but continued to require assistance. Since IE, she demonstrates MDC improvements on 5xSTS, AMARO indicating clinical significant improvements on functional transfers and balance. However, her score on the following OM were below age- or gender- compared normative values including 5xSTS, TUG, mCTSIB, AMARO, 10mWT, and 6MWT and is considered at high risk of falls on all outcome measures. She has significant fear of falling with high reliance on visual system and UE assist. Continued to educate on need for HEP compliance; understanding verbalized. Patient is a good candidate to receive OPPT services to improve her balance and transfers in order to maximize her safety, reduce her risk of falls, and enhance her QoL.     Outcome Measures Initial Eval  11/7 12/10   STS Yrn for anterior WS and lift assist Yrn for anterior WS and lift A   5xSTS 22.23 sec with 1UE 15.82s with 1UE assist, partial reps   TUG  - Regular     17.26 sec no AD   15.19s no AD   10 meter 0.80 m/s 0.84 m/s   AMARO 34/56 40/56   FGA defer defer   mCTSIB  - FTEO (firm)  - FTEC (firm)  - FTEO (foam)  - FTEC (foam)   30 sec  3.51 sec + LOB  Defer  Defer     30s  30s  14.19s  2.10s   6MWT 680 ft no  ft no AD       Impairments: Abnormal coordination, Abnormal gait, Abnormal muscle tone, Abnormal or restricted ROM, Activity intolerance, Impaired balance, Impaired physical strength, Lacks appropriate HEP, Poor posture, Poor body mechanics, Pain with function, Safety issue, Weight-bearing intolerance, Abnormal movement, Difficulty understanding, Abnormal muscle firing  Understanding of Dx/Px/POC: Good  Prognosis: Good    Patient verbalized understanding of POC.         Please  contact me if you have any questions or recommendations. Thank you for the referral and the opportunity to share in Kandis Paiz's care.        Plan  Plan details:   Patient would benefit from: Skilled PT  Planned modality interventions: Biofeedback, Cryotherapy, TENS, Thermotherapy  Planned therapy interventions: Abdominal trunk stabilization, ADL training, Balance, Balance/WB training, Breathing training, Body mechanics training, Coordination, Functional ROM exercises, Gait training, HEP, Joint Mobilization, Manual Therapy, Gomez taping, Motor coordination training, Neuromuscular re-education, Patient education, Postural training, Strengthening, Stretching, Therapeutic activities, Therapeutic exercises, Therapeutic training, Transfer training, Activity modification, Work reintegration  Frequency: 1-3x/wk  Duration in weeks: 12 weeks  Plan of Care beginning date: 11/7/24  Plan of Care expiration date: 3 months - 3/10/2025  Treatment plan discussed with: Patient       Goals  Short Term Goals (4 weeks):    - Patient will improve time on TUG by 2.9 seconds to facilitate improved safety in all ambulation  - Patient will be independent in basic HEP 2-3 weeks  - Patient will improve 5xSTS score by 2.3 seconds to promote improved LE functional strength needed for ADLs  - Patient will complete components of HiMAT to promote agility necessary for sports related tasks    Long Term Goals (12 weeks):  - Patient will be independent in a comprehensive home exercise program  - Patient will improve scoring on DGI by 2.6 points to progress safety  - Patient will improve gait speed by 0.18 m/s to improve safety with community ambulation  - Patient will improve AMARO by 6 points in order to improve static balance and reduce risk for falls  - Patient will improve scoring on FGA by 4 points to progress safety with dynamic tasks  - Patient will be able to demonstrate HT in gait without veering  - Patient will improve 6 Minute Walk  Test score by 190 feet to promote improved cardiovascular endurance  - Patient will report 50% reduction in near falls in order to improve safety with functional tasks and reduce his risk for falls  - Patient will report going on walks at least 3 days per week to promote independence and improved cardiovascular endurance  - Patient will be able to ascend/descend stairs reciprocally with 1 UE assist to promote independence and safety with ADLs  - Patient will report 50% reduction in near falls when ambulating on uneven terrain      Cut off score    All date taken from APTA Neuro Section or Rehab Measures      Childs/56  MDC: 6 pts  Age Norms:  60-69: M - 55   F - 55  70-79: M - 54   F - 53  80-89: M - 53   F - 50 5xSTS: Katya et al 2010  MDC: 2.3 sec  Age Norms:  60-69: 11.4 sec  70-79: 12.6 sec  80-89: 14.8 sec   TUG  MDC: 4.14 sec  Cut off score:  >13.5 sec community dwelling adults  >32.2 frail elderly  <20 I for basic transfers  >30 dependent on transfers 10 Meter Walk Test: Eva et al 2011  MDC: 0.18 m/s  20-29: M - 1.35 m   F - 1.34 m  30-39: M - 1.43 m   F - 1.34 m  40-49: M - 1.43 m   F - 1.39 m  50-59: M - 1.43 m   F - 1.31 m  60-69: M - 1.34 m   F - 1.24 m  70-79: M - 1.26 m   F - 1.13 m  80-89: M - 0.97 m   F - 0.94 m    Household Ambulator < 0.4 m/s  Limited Community Ambulator 0.4 - 0.8 m/s  Community Ambulator 0.8 - 1.2 m/s  Safely cross the street > 1.2 m/s   FGA  MCID: 4 pts  Geriatrics/community < 22/30 fall risk  Geriatrics/community < 20/30 unexplained falls    DGI  MDC: vestibular - 4 pts  MDC: geriatric/community - 3 pts  Falls risk <19/24 mCTSIB  Norm: 20-60 yrs  Eyes open firm: norm sway 0.21-0.48  Eyes closed firm: norm sway 0.48-0.99  Eyes open foam: norm sway 0.38-0.71  Eyes closed foam: norm sway 0.70-2.22   6 Minute Walk Test  MDC: 190.98 ft  MCID: 164 ft    Age Norms  60-69: M - 1876 ft (571.80 m)  F - 1765 ft (537.98 m)  70-79: M - 1729 ft (527.00 m)  F - 1545 ft (470.92  m)  80-89: M - 1368 ft (416.97 m)  F - 1286 ft (391.97 m) ABC: Jennifer & Corie, 2003  <67% increased risk for falls   [place]-Leonela Monofilaments  Evaluator Size:        Force (grams):          Hand/Dorsal Thresholds:        Plantar Thresholds:  - 1.65                       - 0.008                       - Normal                                 - Normal  - 2.36                       - 0.02                         - Normal                                 - Normal  - 2.44                       - 0.04                         - Normal                                 - Normal  - 2.83                       - 0.07                         - Normal                                 - Normal  - 3.22                       - 0.16                         - Diminished light touch          - Normal  - 3.61                       - 0.40                         - Diminished light touch          - Normal  - 3.84                       - 0.60                         - Diminished protective           - Diminished light touch  - 4.08                       - 1.00                         - Diminished protective           - Diminished light touch  - 4.17                       - 1.40                         - Diminished protective           - Diminished light touch  - 4.31                       - 2.00                         - Diminished protective           - Diminished light touch  - 4.56                       - 4.00                         - Loss of protective sense      - Diminished protective  - 4.74                       - 6.00                         - Loss of protective sense      - Diminished protective  - 4.93                       - 8.00                         - Loss of protective sense      - Diminished protective  - 5.07                       - 10.0                         - Loss of protective sense     - Loss of protective sense  - 5.18                       - 15.0                         - Loss of protective sense     -  Loss of protective sense  - 5.46                       - 26.0                         - Loss of protective sense     - Loss of protective sense  - 5.88                       - 60.0                         - Loss of protective sense     - Loss of protective sense  - 6.10                       - 100                          - Loss of protective sense     - Loss of protective sense  - 6.45                       - 180                          - Loss of protective sense     - Loss of protective sense  - 6.65                       - 300                          - Deep pressure sense only  - Deep pressure sense only         Subjective    History of Present Illness  - Mechanism of injury: Patient arrives with complaints of imbalance and infrequent episodes of dizziness (2x/year). Not caused by positional changes (denied rolling in bed, lying <> sit <> stand). Reports of wave of dizziness, with feeling of falling backwards, and feels unlike herself for 24 hours. Unable to perform STS transfer IND without UE assist and requires assistance for floor transfer. Reports 2 falls in past year with 1 injury.     UPDATED 12/10/2024: Patient denies falls since IE. Reported one instance of dizziness while watching TV (no movements, no head turns/positional changes) lasting seconds. Closes eyes and was okay.    BP: 156/76 seated L arm, manual reading    - Primary AD: none for household amb, SPC for community amb (uneven terrain, long distances)  - Assist level at home: IND  - Decreased fine motor tasks: No    Patient goal:   Improve balance  Improve confidence in walking    Pain  - Current pain ratin/10  - At best pain ratin/10  - At worst pain ratin/10  - Location: L knee (TKR performed 1 year ago)  - Aggravating factors: bending     Social Support  - Steps to enter house: 3 RANDI with L HR  - Stairs in house: FF to cellar with B HR   - Lives in: single level with basement  - Lives with: alone; sons live > 30 min away    -  "Employment status: retired, [employer] employee  - Hand dominance: R-handed    Treatments  - Previous treatment: ortho PT  - Current treatment: fam med  - Diagnostic Testing: none      Objective     LE MMT  - R Hip Flexion: 3+/5   L Hip Flexion: 3+/5  - R Hip Extension: 4-/5  L Hip Extension: 4-/5  - R Hip Abduction: 4/5   L Hip Abduction: 4/5  - R Hip Adduction: 4/5   L Hip Adduction: 4/5  - R Knee Extension: 4/5  L Knee Extension: 4/5  - R Knee Flexion: 4/5   L Knee Flexion: 4/5  - R Ankle DF: 4/5   L Ankle DF: 4/5  - R Ankle PF: 4/5   L Ankle PF: 4/5    Sensation  - Light touch: WFL (reduced anteriorly, distal to L knee following TKR to mid lower leg)  - Deep pressure: WFL    Coordination  - Heel to Shin: WFL  - Alternate Toe Taps: abnormal L  - Finger to Nose: abnormal L, dysmetric  - Dysdiadochokinesia: abnormal L    Reflexes/Clonus  - Clonus: None  - Patellar DTR: 2+: Normal BL    OT Screen  - Difficulty w/ clothing fasteners: No  - Difficulty w/ bathing: No  - Difficulty w/ dressing: No  - Difficulty w/ toileting: No    Gait  - Abnormalities: NV    Vestibular Objective  Cervical Spine AROM:  - WFL, no pain all movements    Oculomotor Screen  - Baseline Symptoms: 0/10  - Gaze Holding Nystagmus: H: Normal   - Spontaneous Nystagmus Room Light: H: Normal   - Smooth Pursuits (central): H: Abnormal Dizziness: 0/10, Observation: difficulty maintaining fixation; saccadic movements H/V  - Saccades (central): H: Normal   - Near Point Convergence (normal: < 4\"/10 cm - central): H: Normal  - VORx1: H: Normal Dizziness: 0/10; denied dizziness       Outcome Measures Initial Eval  11/7 12/10       STS Yrn for anterior WS and lift assist Yrn for anterior WS and lift A       5xSTS 22.23 sec with 1UE 15.82s with 1UE assist, partial reps       TUG  - Regular     17.26 sec no AD   15.19s no AD       10 meter 0.80 m/s 0.84 m/s       PREM 34/56 40/56       FGA defer defer       mCTSIB  - FTEO (firm)  - FTEC (firm)  - FTEO " (foam)  - FTEC (foam)   30 sec  3.51 sec + LOB  Defer  Defer     30s  30s  14.19s  2.10s       6MWT 680 ft no  ft no AD       ABC 50% 63.75%                         Precautions: hx cancer, HTN (new med change; check BP), L knee pain (TKR 1 year ago)  Past Medical History:   Diagnosis Date    Basal cell carcinoma     H/O colonoscopy     History of nonmelanoma skin cancer     last assessed 11/20/17    Hyperlipidemia     Hypertension     Inflamed seborrheic keratosis     Overweight with body mass index (BMI) of 27 to 27.9 in adult     Sciatica     Uterine fibroid     Viral warts

## 2024-12-12 ENCOUNTER — OFFICE VISIT (OUTPATIENT)
Age: 84
End: 2024-12-12
Payer: MEDICARE

## 2024-12-12 DIAGNOSIS — R26.89 IMPAIRMENT OF BALANCE: ICD-10-CM

## 2024-12-12 DIAGNOSIS — R42 VERTIGO: Primary | ICD-10-CM

## 2024-12-12 PROCEDURE — 97112 NEUROMUSCULAR REEDUCATION: CPT

## 2024-12-12 NOTE — PROGRESS NOTES
"Daily Note     Today's date: 2024  Patient name: Kandis Paiz  : 1940  MRN: [id number]  Referring provider: Krysta Nolasco DO  Dx:   Encounter Diagnosis     ICD-10-CM    1. Vertigo  R42       2. Impairment of balance  R26.89                      Subjective: Patient denied falls. Reports reduced L knee pain, but continues to have pain.      Objective: See treatment diary below    TA  Nu-step 8 min, lvl 2  Seat 9, UE 9    NMR:  STS with foam pad (1) and no UE assist, 2 x 10, cue for 3s ecc control -  Fwd/bwd amb 4 laps  Side stepping 4 laps   HKM with 3s iso, cue for glute med cx 4 laps, 0-1 UE assist  Lat curb step up 2x10 with focus on ecc control, to reduce Trendelenburg (R>L)  Semi-tandem amb 4 laps, 0-1 UE assist  Amb with HHT 3 laps  Fwd/bwd/lat 5 x 6\" hurdles with 1-2UE assist 3 cycles ea     Assessment: Tolerated treatment well.  Patient participated in skilled PT session focused on enhancing gait / balance. Patient improved with tandem walking this session. Patient was able to perform sit to stands and experienced no loss of balance.  Patient would continue to benefit from skilled PT interventions to address deficits with balance, gait and vestibular dysfunction. Patient demonstrated fatigue post treatment      Plan: Continue per plan of care.      POC expires Unit limit Auth Expiration date PT/OT + Visit Limit? Co-Insurance   25 N/a pend BOMN Yes                               Visit/Unit Tracking  11/7 11/11 11/15 11/18 11/25 12/3 12/5 12/10 12/12                                           "

## 2024-12-17 ENCOUNTER — OFFICE VISIT (OUTPATIENT)
Age: 84
End: 2024-12-17
Payer: MEDICARE

## 2024-12-17 DIAGNOSIS — R42 VERTIGO: Primary | ICD-10-CM

## 2024-12-17 DIAGNOSIS — R26.89 IMPAIRMENT OF BALANCE: ICD-10-CM

## 2024-12-17 PROCEDURE — 97112 NEUROMUSCULAR REEDUCATION: CPT

## 2024-12-17 NOTE — PROGRESS NOTES
"Daily Note     Today's date: 2024  Patient name: Kandis Paiz  : 1940  MRN: [id number]  Referring provider: Krysta Nolasco DO  Dx:   Encounter Diagnosis     ICD-10-CM    1. Vertigo  R42       2. Impairment of balance  R26.89                      Subjective: Patient denied falls. Reports reduced L knee pain, but continues to have pain.      Objective: See treatment diary below    TA  Floor recovery x1   UE assist on mat table    Pain in WB on L knee    Edu and PT demo on sequence without WB on L knee; can trial NV    NMR:  STS with foam pad (1) and no UE assist, 2 x 10, cue for 3s ecc control    VC for anterior WS  Fwd EC /bwd EO amb 4 laps  Side stepping 4 laps   HKM with 3s iso, cue for glute med cx 4 laps, 0-1 UE assist  Lat curb step up 2x10 with focus on ecc control, to reduce Trendelenburg (R>L)  Semi-tandem amb 4 laps, 0-1 UE assist  Fwd/bwd/lat 5 x 6\" hurdles with 1UE assist 3 cycles ea  Oli taps 12\" x10/ea with 1-2 UE assist     BIODEX  Weight Shift Training   Lat 1.0 min   AP 1.5 min  Motor Control Training   14%, 20%       Assessment: Tolerated treatment well.  Patient participated in skilled PT session focused on enhancing gait / balance. Patient improved with tandem walking this session. Patient was able to perform sit to stands and experienced no loss of balance, but required cue and education on foot placement and appropriate anterior WS.  Patient would continue to benefit from skilled PT interventions to address deficits with balance, gait and vestibular dysfunction. Patient demonstrated fatigue post treatment      Plan: Continue per plan of care.      POC expires Unit limit Auth Expiration date PT/OT + Visit Limit? Co-Insurance   25 N/a pend BOMN Yes                               Visit/Unit Tracking  11/7 11/11 11/15 11/18 11/25 12/3 12/5 12/10 12/12 12/17                                          "

## 2024-12-19 ENCOUNTER — OFFICE VISIT (OUTPATIENT)
Age: 84
End: 2024-12-19
Payer: MEDICARE

## 2024-12-19 DIAGNOSIS — R42 VERTIGO: Primary | ICD-10-CM

## 2024-12-19 DIAGNOSIS — R26.89 IMPAIRMENT OF BALANCE: ICD-10-CM

## 2024-12-19 PROCEDURE — 97112 NEUROMUSCULAR REEDUCATION: CPT

## 2024-12-19 NOTE — PROGRESS NOTES
"Daily Note     Today's date: 2024  Patient name: Kandis Paiz  : 1940  MRN: [id number]  Referring provider: Krysta Nolasco DO  Dx:   Encounter Diagnosis     ICD-10-CM    1. Vertigo  R42       2. Impairment of balance  R26.89           Start Time: 1100  Stop Time: 1145  Total time in clinic (min): 45 minutes    Subjective: Patient reports no new complaints.      Objective: See treatment diary below    A  Floor recovery x1   UE assist on mat table    Pain in WB on L knee    Edu and PT demo on sequence without WB on L knee; can trial NV    NMR:  STS with foam pad (1) and no UE assist, 2 x 10, cue for 3s ecc control    VC for anterior WS  Fwd EC /bwd EO amb 4 laps  Side stepping 4 laps   HKM with 3s iso, cue for glute med cx 4 laps, 0-1 UE assist  Lat curb step up 2x10 with focus on ecc control, to reduce Trendelenburg (R>L)  Semi-tandem amb 4 laps, 0-1 UE assist  Fwd/bwd/lat 5 x 6\" hurdles with 1UE assist 3 cycles ea  Oli taps 12\" x10/ea with 1-2 UE assist     BIODEX  Weight Shift Training   Lat 1.0 min   AP 1.5 min  Motor Control Training   14%, 20%      Assessment: Tolerated treatment well.   Patient participated in skilled PT session focused on balance, gait and endurance.  Patient able to complete exercise program with no issues.  Patient demonstrates improve dynamic balance on firm surfaces with decreased B/L UE support.  Patient would continue to benefit from skilled PT interventions to address deficits with balance, gait, and endurance. Patient demonstrated fatigue post treatment      Plan: Continue per plan of care.        POC expires Unit limit Auth Expiration date PT/OT + Visit Limit? Co-Insurance   25 N/a pend BOMN Yes                               Visit/Unit Tracking  11/7 11/11 11/15 11/18 11/25 12/3 12/5 12/10 12/12 12/17 12/19                                           "

## 2024-12-24 ENCOUNTER — APPOINTMENT (OUTPATIENT)
Age: 84
End: 2024-12-24
Payer: MEDICARE

## 2024-12-26 ENCOUNTER — APPOINTMENT (OUTPATIENT)
Age: 84
End: 2024-12-26
Payer: MEDICARE

## 2024-12-27 ENCOUNTER — OFFICE VISIT (OUTPATIENT)
Age: 84
End: 2024-12-27
Payer: MEDICARE

## 2024-12-27 DIAGNOSIS — R26.89 IMPAIRMENT OF BALANCE: ICD-10-CM

## 2024-12-27 DIAGNOSIS — R42 VERTIGO: Primary | ICD-10-CM

## 2024-12-27 PROCEDURE — 97112 NEUROMUSCULAR REEDUCATION: CPT

## 2024-12-27 NOTE — PROGRESS NOTES
"Daily Note     Today's date: 2024  Patient name: Kandis Paiz  : 1940  MRN: [id number]  Referring provider: Krysta Nolasco DO  Dx:   Encounter Diagnosis     ICD-10-CM    1. Vertigo  R42       2. Impairment of balance  R26.89                      Subjective: Patient reports no new complaints. Denies falls, interested in DC today.      Objective: See treatment diary below    NMR:  STS with foam pad (1) and no UE assist, 2 x 10, cue for 3s ecc control    VC for anterior WS  Lat curb step up 2x10 with focus on ecc control, to reduce Trendelenburg (R>L)  HKM with 3s iso, cue for glute med cx 4 laps, 0-1 UE assist  Side step with 7.5# tidal tank hold 3 laps  Amb with UL farmers carry with 5# tidal tank carry x100' /ea UE hold  Stool taps 2 x 10/ea LE with R HHA   LOB x 2 req Yayo  Fwd/bwd/lat 5 x 6\" hurdles with 1UE assist 3 cycles ea    TA  Comprehensive HEP -   Access Code: [email or web address]  URL: [email or web address]  Date: 2024  Prepared by: Lizzie Hidalgo    Exercises  - Proper Sit to Stand Technique  - 1 x daily - 5 x weekly - 2 sets - 10 reps  - Standing Hip Abduction with Counter Support  - 1 x daily - 5 x weekly - 2 sets - 10 reps - 3 sec hold  - Standing Hip Extension with Counter Support  - 1 x daily - 5 x weekly - 2 sets - 10 reps - 3 sec hold  - Standing March with Counter Support  - 1 x daily - 5 x weekly - 2 sets - 10 reps - 3 sec hold  - Heel Raises with Counter Support  - 1 x daily - 5 x weekly - 2 sets - 10 reps - 3 sec hold  - Walking Tandem Stance  - 1 x daily - 5 x weekly - 3 sets  - Walking March  - 1 x daily - 5 x weekly - 3 sets  - Step Up  - 1 x daily - 5 x weekly - 2 sets - 10 reps  - Side Stepping with Resistance at Thighs and Counter Support  - 1 x daily - 5 x weekly - 3 sets  - Mini Squat with Counter Support  - 1 x daily - 5 x weekly - 2 sets - 10 reps        Assessment: Tolerated treatment well.   Patient participated in skilled PT session focused on balance, " gait and endurance.  Patient able to complete exercise program with no issues.  Patient demonstrates improve dynamic balance on firm surfaces with decreased B/L UE support.  At this time, patient req to be DC from skilled OPPT to comprehensive HEP.       Plan: Discharge       POC expires Unit limit Auth Expiration date PT/OT + Visit Limit? Co-Insurance   2/7/25 N/a pend BOMN Yes                               Visit/Unit Tracking  11/7 11/11 11/15 11/18 11/25 12/3 12/5 12/10 12/12 12/17 12/19 12/27

## 2025-01-26 DIAGNOSIS — I10 PRIMARY HYPERTENSION: ICD-10-CM

## 2025-01-27 RX ORDER — LISINOPRIL 40 MG/1
40 TABLET ORAL DAILY
Qty: 90 TABLET | Refills: 1 | Status: SHIPPED | OUTPATIENT
Start: 2025-01-27

## 2025-02-08 DIAGNOSIS — I10 HYPERTENSION, UNSPECIFIED TYPE: ICD-10-CM

## 2025-02-08 DIAGNOSIS — E03.9 HYPOTHYROIDISM, UNSPECIFIED TYPE: ICD-10-CM

## 2025-02-08 DIAGNOSIS — E78.5 HYPERLIPIDEMIA, UNSPECIFIED HYPERLIPIDEMIA TYPE: ICD-10-CM

## 2025-02-10 RX ORDER — LISINOPRIL 20 MG/1
20 TABLET ORAL DAILY
Qty: 90 TABLET | Refills: 0 | OUTPATIENT
Start: 2025-02-10

## 2025-02-10 RX ORDER — METOPROLOL SUCCINATE 25 MG/1
25 TABLET, EXTENDED RELEASE ORAL DAILY
Qty: 90 TABLET | Refills: 1 | Status: SHIPPED | OUTPATIENT
Start: 2025-02-10

## 2025-02-10 RX ORDER — LEVOTHYROXINE SODIUM 50 UG/1
50 TABLET ORAL DAILY
Qty: 90 TABLET | Refills: 1 | Status: SHIPPED | OUTPATIENT
Start: 2025-02-10

## 2025-02-10 RX ORDER — ATORVASTATIN CALCIUM 20 MG/1
20 TABLET, FILM COATED ORAL DAILY
Qty: 90 TABLET | Refills: 1 | Status: SHIPPED | OUTPATIENT
Start: 2025-02-10

## 2025-03-20 ENCOUNTER — OFFICE VISIT (OUTPATIENT)
Age: 85
End: 2025-03-20
Payer: MEDICARE

## 2025-03-20 VITALS
TEMPERATURE: 98.7 F | HEART RATE: 72 BPM | BODY MASS INDEX: 26.66 KG/M2 | WEIGHT: 160 LBS | OXYGEN SATURATION: 93 % | HEIGHT: 65 IN | RESPIRATION RATE: 14 BRPM | SYSTOLIC BLOOD PRESSURE: 138 MMHG | DIASTOLIC BLOOD PRESSURE: 78 MMHG

## 2025-03-20 DIAGNOSIS — D22.9 MULTIPLE MELANOCYTIC NEVI: ICD-10-CM

## 2025-03-20 DIAGNOSIS — B07.9 VERRUCA VULGARIS: ICD-10-CM

## 2025-03-20 DIAGNOSIS — D22.9 NEVUS: ICD-10-CM

## 2025-03-20 DIAGNOSIS — Z85.828 HISTORY OF SCC (SQUAMOUS CELL CARCINOMA) OF SKIN: ICD-10-CM

## 2025-03-20 DIAGNOSIS — L82.1 SEBORRHEIC KERATOSIS: Primary | ICD-10-CM

## 2025-03-20 DIAGNOSIS — Z85.828 HISTORY OF BASAL CELL CARCINOMA (BCC): ICD-10-CM

## 2025-03-20 DIAGNOSIS — L81.4 LENTIGINES: ICD-10-CM

## 2025-03-20 DIAGNOSIS — D18.01 CHERRY ANGIOMA: ICD-10-CM

## 2025-03-20 PROCEDURE — 17110 DESTRUCTION B9 LES UP TO 14: CPT | Performed by: STUDENT IN AN ORGANIZED HEALTH CARE EDUCATION/TRAINING PROGRAM

## 2025-03-20 PROCEDURE — 99213 OFFICE O/P EST LOW 20 MIN: CPT | Performed by: STUDENT IN AN ORGANIZED HEALTH CARE EDUCATION/TRAINING PROGRAM

## 2025-03-20 NOTE — PROGRESS NOTES
"[facility] Note     Patient Name: Kandis Paiz  Encounter Date: 3/20/2025     Have you been cared for by a [facility] Dermatologist in the last 3 years and, if so, which description applies to you?    Yes.  I have been here within the last 3 years, and my medical history has NOT changed since that time.  I am FEMALE/of child-bearing potential.    REVIEW OF SYSTEMS:  Have you recently had or currently have any of the following? No changes in my recent health.   PAST MEDICAL HISTORY:  Have you personally ever had or currently have any of the following?  If \"YES,\" then please provide more detail. No changes in my medical history.   HISTORY OF IMMUNOSUPPRESSION: Do you have a history of any of the following:  Systemic Immunosuppression such as Diabetes, Biologic or Immunotherapy, Chemotherapy, Organ Transplantation, Bone Marrow Transplantation or Prednisone?  No     Answering \"YES\" requires the addition of the dotphrase \"IMMUNOSUPPRESSED\" as the first diagnosis of the patient's visit.   FAMILY HISTORY:  Any \"first degree relatives\" (parent, brother, sister, or child) with the following?    No changes in my family's known health.   PATIENT EXPERIENCE:    Do you want the Dermatologist to perform a COMPLETE skin exam today including a clinical examination under the \"bra and underwear\" areas?  Yes  If necessary, do we have your permission to call and leave a detailed message on your Preferred Phone number that includes your specific medical information?  Yes      Allergies   Allergen Reactions   • Aspirin       Current Outpatient Medications:   •  atorvastatin (LIPITOR) 20 mg tablet, TAKE 1 TABLET BY MOUTH EVERY DAY, Disp: 90 tablet, Rfl: 1  •  Calcium-Vitamin D-Vitamin K 500-100-40 MG-UNT-MCG CHEW, Chew, Disp: , Rfl:   •  estradiol (ESTRACE) 0.1 mg/g vaginal cream, , Disp: , Rfl:   •  Flaxseed, Linseed, (FLAXSEED OIL) 1000 MG CAPS, Take 1 capsule by mouth daily, Disp: , Rfl:   •  levothyroxine 50 mcg " tablet, TAKE 1 TABLET BY MOUTH EVERY DAY, Disp: 90 tablet, Rfl: 1  •  lisinopril (ZESTRIL) 40 mg tablet, TAKE 1 TABLET BY MOUTH EVERY DAY, Disp: 90 tablet, Rfl: 1  •  meclizine (ANTIVERT) 12.5 MG tablet, Take 1 tablet (12.5 mg total) by mouth 3 (three) times a day as needed for dizziness, Disp: 30 tablet, Rfl: 0  •  metoprolol succinate (TOPROL-XL) 25 mg 24 hr tablet, TAKE 1 TABLET (25 MG TOTAL) BY MOUTH DAILY., Disp: 90 tablet, Rfl: 1  •  Multiple Vitamins-Minerals (MULTIVITAMIN GUMMIES ADULT) CHEW, Chew, Disp: , Rfl:           Whom besides the patient is providing clinical information about today's encounter?   NO ADDITIONAL HISTORIAN (patient alone provided history)    Physical Exam and Assessment/Plan by Diagnosis:         HISTORY OF BASAL CELL CARCINOMA     Physical Exam:  Anatomic Location Affected:  left anterior shoulder in 2020, left knee in 2019.  Morphological Description of scar:  scars well healed  Suspected Recurrence: No     Additional History of Present Condition:  History of basal cell carcinoma with no sign of recurrence     Assessment and Plan:  Based on a thorough discussion of this condition and the management approach to it (including a comprehensive discussion of the known risks, side effects and potential benefits of treatment), the patient (family) agrees to implement the following specific plan:  Monitor for change  When outside we recommend using a wide brim hat, sunglasses, long sleeve and pants, sunscreen with SPF 30+ with reapplication every 2 hours, or SPF specific clothing       HISTORY OF SQUAMOUS CELL CARCINOMA      Physical Exam:  Anatomic Location Affected:  mid chest and right thigh in 2021, left elbow in 2019.  Morphological Description of Scar:  scars well healed  Suspected Recurrence: no  Regional adenopathy: no     Additional History of Present Condition:  History of squamous cell carcinoma with no sign of recurrence.      Assessment and Plan:  Based on a thorough discussion  "of this condition and the management approach to it (including a comprehensive discussion of the known risks, side effects and potential benefits of treatment), the patient (family) agrees to implement the following specific plan:  Monitor for change  When outside we recommend using a wide brim hat, sunglasses, long sleeve and pants, sunscreen with SPF 30+ with reapplication every 2 hours, or SPF specific clothing            MELANOCYTIC NEVI (\"Moles\")    Physical Exam:  Anatomic Location Affected:   Mostly on sun-exposed areas of the trunk and extremities  Morphological Description:  Scattered, 1-4mm round to ovoid, symmetrical-appearing, even bordered, skin colored to dark brown macules/papules, mostly in sun-exposed areas  Pertinent Positives:  Pertinent Negatives:    Additional History of Present Condition:      Assessment and Plan:  Based on a thorough discussion of this condition and the management approach to it (including a comprehensive discussion of the known risks, side effects and potential benefits of treatment), the patient (family) agrees to implement the following specific plan:  When outside we recommend using a wide brim hat, sunglasses, long sleeve and pants, sunscreen with SPF 30+ with reapplication every 2 hours, or SPF specific clothing   Benign, reassured  Annual skin check     Melanocytic Nevi  Melanocytic nevi (\"moles\") are tan or brown, raised or flat areas of the skin which have an increased number of melanocytes. Melanocytes are the cells in our body which make pigment and account for skin color.    Some moles are present at birth (I.e., \"congenital nevi\"), while others come up later in life (i.e., \"acquired nevi\").  The sun can stimulate the body to make more moles.  Sunburns are not the only thing that triggers more moles.  Chronic sun exposure can do it too.     Clinically distinguishing a healthy mole from melanoma may be difficult, even for experienced dermatologists. The \"ABCDE's\" of " "moles have been suggested as a means of helping to alert a person to a suspicious mole and the possible increased risk of melanoma.  The suggestions for raising alert are as follows:    Asymmetry: Healthy moles tend to be symmetric, while melanomas are often asymmetric.  Asymmetry means if you draw a line through the mole, the two halves do not match in color, size, shape, or surface texture. Asymmetry can be a result of rapid enlargement of a mole, the development of a raised area on a previously flat lesion, scaling, ulceration, bleeding or scabbing within the mole.  Any mole that starts to demonstrate \"asymmetry\" should be examined promptly by a board certified dermatologist.     Border: Healthy moles tend to have discrete, even borders.  The border of a melanoma often blends into the normal skin and does not sharply delineate the mole from normal skin.  Any mole that starts to demonstrate \"uneven borders\" should be examined promptly by a board certified dermatologist.     Color: Healthy moles tend to be one color throughout.  Melanomas tend to be made up of different colors ranging from dark black, blue, white, or red.  Any mole that demonstrates a color change should be examined promptly by a board certified dermatologist.     Diameter: Healthy moles tend to be smaller than 0.6 cm in size; an exception are \"congenital nevi\" that can be larger.  Melanomas tend to grow and can often be greater than 0.6 cm (1/4 of an inch, or the size of a pencil eraser). This is only a guideline, and many normal moles may be larger than 0.6 cm without being unhealthy.  Any mole that starts to change in size (small to bigger or bigger to smaller) should be examined promptly by a board certified dermatologist.     Evolving: Healthy moles tend to \"stay the same.\"  Melanomas may often show signs of change or evolution such as a change in size, shape, color, or elevation.  Any mole that starts to itch, bleed, crust, burn, hurt, or " ulcerate or demonstrate a change or evolution should be examined promptly by a board certified dermatologist.        LENTIGO    Physical Exam:  Anatomic Location Affected:  trunk, arms  Morphological Description:  Light brown macules  Pertinent Positives:  Pertinent Negatives:    Additional History of Present Condition:  Present on exam     Assessment and Plan:  Based on a thorough discussion of this condition and the management approach to it (including a comprehensive discussion of the known risks, side effects and potential benefits of treatment), the patient (family) agrees to implement the following specific plan:  When outside we recommend using a wide brim hat, sunglasses, long sleeve and pants, sunscreen with SPF 30+ with reapplication every 2 hours, or SPF specific clothing       What is a lentigo?  A lentigo is a pigmented flat or slightly raised lesion with a clearly defined edge. Unlike an ephelis (freckle), it does not fade in the winter months. There are several kinds of lentigo.  The name lentigo originally referred to its appearance resembling a small lentil. The plural of lentigo is lentigines, although “lentigos” is also in common use.    Who gets lentigines?  Lentigines can affect males and females of all ages and races. Solar lentigines are especially prevalent in fair skinned adults. Lentigines associated with syndromes are present at birth or arise during childhood.    What causes lentigines?  Common forms of lentigo are due to exposure to ultraviolet radiation:  Sun damage including sunburn   Indoor tanning   Phototherapy, especially photochemotherapy (PUVA)    Ionizing radiation, eg radiation therapy, can also cause lentigines.  Several familial syndromes associated with widespread lentigines originate from mutations in Paulo-MAP kinase, mTOR signaling and PTEN pathways.    What is the treatment for lentigines?  Most lentigines are left alone. Attempts to lighten them may not be successful. The  "following approaches are used:  SPF 50+ broad-spectrum sunscreen   Hydroquinone bleaching cream   Alpha hydroxy acids   Vitamin C   Retinoids   Azelaic acid   Chemical peels  Individual lesions can be permanently removed using:  Cryotherapy   Intense pulsed light   Pigment lasers    How can lentigines be prevented?  Lentigines associated with exposure ultraviolet radiation can be prevented by very careful sun protection. Clothing is more successful at preventing new lentigines than are sunscreens.    What is the outlook for lentigines?  Lentigines usually persist. They may increase in number with age and sun exposure. Some in sun-protected sites may fade and disappear.    CORONADO ANGIOMAS    Physical Exam:  Anatomic Location Affected:  trunk  Morphological Description:  Scattered cherry red, 1-4 mm papules.  Pertinent Positives:  Pertinent Negatives:    Additional History of Present Condition:    Present on exam     Assessment and Plan:  Based on a thorough discussion of this condition and the management approach to it (including a comprehensive discussion of the known risks, side effects and potential benefits of treatment), the patient (family) agrees to implement the following specific plan:  Monitor for changes  Benign, reassured      Assessment and Plan:    Cherry angioma, also known as Henson de Monty spots, are benign vascular skin lesions. A \"cherry angioma\" is a firm red, blue or purple papule, 0.1-1 cm in diameter. When thrombosed, they can appear black in colour until evaluated with a dermatoscope when the red or purple colour is more easily seen. Cherry angioma may develop on any part of the body but most often appear on the scalp, face, lips and trunk.  An angioma is due to proliferating endothelial cells; these are the cells that line the inside of a blood vessel.    Angiomas can arise in early life or later in life; the most common type of angioma is a cherry angioma.  Cherry angiomas are very " "common in males and females of any age or race. They are more noticeable in white skin than in skin of colour. They markedly increase in number from about the age of 40. There may be a family history of similar lesions. Eruptive cherry angiomas have been rarely reported to be associated with internal malignancy. The cause of angiomas is unknown. Genetic analysis of cherry angiomas has shown that they frequently carry specific somatic missense mutations in the GNAQ and GNA11 (Q209H) genes, which are involved in other vascular and melanocytic proliferations.      SEBORRHEIC KERATOSIS; NON-INFLAMED    Physical Exam:  Anatomic Location Affected:  trunk  Morphological Description:  Flat and raised, waxy, smooth to warty textured, yellow to brownish-grey to dark brown to blackish, discrete, \"stuck-on\" appearing papules.  Pertinent Positives:  Pertinent Negatives:    Additional History of Present Condition:    Present on exam     Assessment and Plan:  Based on a thorough discussion of this condition and the management approach to it (including a comprehensive discussion of the known risks, side effects and potential benefits of treatment), the patient (family) agrees to implement the following specific plan:  Monitor for changes  Benign, reassured      Seborrheic Keratosis  A seborrheic keratosis is a harmless warty spot that appears during adult life as a common sign of skin aging.  Seborrheic keratoses can arise on any area of skin, covered or uncovered, with the usual exception of the palms and soles. They do not arise from mucous membranes. Seborrheic keratoses can have highly variable appearance.      Seborrheic keratoses are extremely common. It has been estimated that over 90% of adults over the age of 60 years have one or more of them. They occur in males and females of all races, typically beginning to erupt in the 30s or 40s. They are uncommon under the age of 20 years.  The precise cause of seborrhoeic keratoses " "is not known.  Seborrhoeic keratoses are considered degenerative in nature. As time goes by, seborrheic keratoses tend to become more numerous. Some people inherit a tendency to develop a very large number of them; some people may have hundreds of them.      There is no easy way to remove multiple lesions on a single occasion.  Unless a specific lesion is \"inflamed\" and is causing pain or stinging/burning or is bleeding, most insurance companies do not authorize treatment.      VERUCCA VULGARIS (\"COMMON WART\")      Physical Exam:  Anatomic Location: forehead, behind right ear, shoulder, left cheek  Morphologic Description:  hyperkeratotic filiform papule; scaly thick plaque  Pertinent Positives:  Pertinent Negatives:    Additional History of Present Condition:  Present on exam    Plan:  Discussed this condition - while contagious - is very common and nearly harmless.  It is caused by an infection with human papillomavirus (HPV).  It is most common in school-aged children; however, warts may occur at any age.  They are also seen in greater frequency in the setting of other dermatitis (due to a defective skin barrier) and immunosuppression (e.g., from medications or HIV infection).  Warts are spread by direct skin-to-skin contact or auto-inoculation if a wart is scratched or picked.  The incubation period may be 12+ months depending on the amount of virus inoculated.  Treatments usually make the wart smaller and less uncomfortable and many times leads to total resolution. In children - even without treatment - most warts (up to 90%) may resolve within 2 years.  Warts may be more persistent in adults.  CRYOTHERAPY.  Patient/family opts to treat the warts with liquid nitrogen.  Usually this is done at 2- to 4-week intervals.  It destroys the outer layer of skin and causes peeling.  It is uncomfortable and may result in blistering for several days or longer.  It may also result in skin color changes (lightening or " darkening of the skin) and even numbness if performed over a superficial nerve such as the side of a finger.  It may also result in permanent nail injury/dystrophy if the nail matrix is damaged during freezing.  Success rates are around 70% after 3 to 4 months of regular freezing sessions.  SEE PROCEDURE NOTE BELOW.       PROCEDURES PERFORMED TODAY ASSOCIATED WITH THIS CONDITION:            PROCEDURE:  DESTRUCTION OF BENIGN LESIONS WITH CRYOTHERAPY  After a thorough discussion of treatment options and risk/benefits/alternatives (including but not limited to local pain, scarring, dyspigmentation, blistering, and possible superinfection), verbal and written consent were obtained and the aforementioned lesions were treated on with cryotherapy using liquid nitrogen x 3 cycle for 5-10 seconds.    TOTAL NUMBER of 4 lesions were treated today on the ANATOMIC LOCATION: behind right ear, left forehead, left cheek, right shoulder.    The patient tolerated the procedure well, and after-care instructions were provided.         Medical Complexity:    SELF-LIMITED OR MINOR PROBLEM.  Problem runs a definite and prescribed course, is transient in nature, and is not likely to permanently alter health status.       Scribe Attestation    I,:  Kamila Duron MA am acting as a scribe while in the presence of the attending physician.:       I,:  Alexx Peters DO personally performed the services described in this documentation    as scribed in my presence.:         Kate Fragoso MD  Dermatology, PGY-2

## 2025-04-28 ENCOUNTER — RA CDI HCC (OUTPATIENT)
Dept: OTHER | Facility: HOSPITAL | Age: 85
End: 2025-04-28

## 2025-04-28 NOTE — PROGRESS NOTES
HCC coding opportunities          Chart Reviewed number of suggestions sent to Provider: 1     Patients Insurance  I11.0   Medicare Insurance: Medicare

## 2025-05-02 ENCOUNTER — OFFICE VISIT (OUTPATIENT)
Dept: FAMILY MEDICINE CLINIC | Facility: CLINIC | Age: 85
End: 2025-05-02
Payer: MEDICARE

## 2025-05-02 VITALS
DIASTOLIC BLOOD PRESSURE: 82 MMHG | OXYGEN SATURATION: 96 % | HEIGHT: 65 IN | RESPIRATION RATE: 16 BRPM | BODY MASS INDEX: 26.99 KG/M2 | SYSTOLIC BLOOD PRESSURE: 124 MMHG | WEIGHT: 162 LBS | HEART RATE: 68 BPM

## 2025-05-02 DIAGNOSIS — I35.1 MODERATE AORTIC REGURGITATION: ICD-10-CM

## 2025-05-02 DIAGNOSIS — Z78.0 ENCOUNTER FOR OSTEOPOROSIS SCREENING IN ASYMPTOMATIC POSTMENOPAUSAL PATIENT: ICD-10-CM

## 2025-05-02 DIAGNOSIS — Z13.820 ENCOUNTER FOR OSTEOPOROSIS SCREENING IN ASYMPTOMATIC POSTMENOPAUSAL PATIENT: ICD-10-CM

## 2025-05-02 DIAGNOSIS — I50.32 CHRONIC DIASTOLIC HEART FAILURE (HCC): ICD-10-CM

## 2025-05-02 DIAGNOSIS — R32 URINARY INCONTINENCE, UNSPECIFIED TYPE: ICD-10-CM

## 2025-05-02 DIAGNOSIS — Z00.00 ENCOUNTER FOR MEDICARE ANNUAL WELLNESS EXAM: Primary | ICD-10-CM

## 2025-05-02 DIAGNOSIS — I10 PRIMARY HYPERTENSION: ICD-10-CM

## 2025-05-02 DIAGNOSIS — E03.9 ACQUIRED HYPOTHYROIDISM: ICD-10-CM

## 2025-05-02 DIAGNOSIS — E78.5 HYPERLIPIDEMIA, UNSPECIFIED HYPERLIPIDEMIA TYPE: ICD-10-CM

## 2025-05-02 DIAGNOSIS — Z53.20 MAMMOGRAM DECLINED: ICD-10-CM

## 2025-05-02 PROCEDURE — 99213 OFFICE O/P EST LOW 20 MIN: CPT | Performed by: FAMILY MEDICINE

## 2025-05-02 PROCEDURE — G0439 PPPS, SUBSEQ VISIT: HCPCS | Performed by: FAMILY MEDICINE

## 2025-05-02 PROCEDURE — G2211 COMPLEX E/M VISIT ADD ON: HCPCS | Performed by: FAMILY MEDICINE

## 2025-05-02 NOTE — PROGRESS NOTES
Name: Kandis Paiz      : 1940      MRN: [id number]  Encounter Provider: Krysta Nolasco DO  Encounter Date: 2025   Encounter department: [facility] [address]  :  Assessment & Plan  Chronic diastolic heart failure (HCC)  Wt Readings from Last 3 Encounters:   25 73.5 kg (162 lb)   25 72.6 kg (160 lb)   10/29/24 73.2 kg (161 lb 6.4 oz)               Orders:  •  CBC and differential; Future  •  Comprehensive metabolic panel; Future  •  Lipid panel; Future  •  TSH, 3rd generation with Free T4 reflex; Future    Urinary incontinence, unspecified type         Encounter for Medicare annual wellness exam         Encounter for osteoporosis screening in asymptomatic postmenopausal patient    Orders:  •  DXA bone density spine hip and pelvis; Future    Hyperlipidemia, unspecified hyperlipidemia type    Orders:  •  Lipid panel; Future    Acquired hypothyroidism    Orders:  •  TSH, 3rd generation with Free T4 reflex; Future    Primary hypertension    Orders:  •  Lipid panel; Future    Moderate aortic regurgitation         Mammogram declined           Depression Screening and Follow-up Plan: Patient was screened for depression during today's encounter. They screened negative with a PHQ-2 score of 0.      Urinary Incontinence Plan of Care: counseling topics discussed: practice Kegel (pelvic floor strengthening) exercises, use restroom every 2 hours, limiting fluid intake 3-4 hours before bed and bladder retraining.     Preventive health issues were discussed with patient, and age appropriate screening tests were ordered as noted in patient's After Visit Summary. Personalized health advice and appropriate referrals for health education or preventive services given if needed, as noted in patient's After Visit Summary.    History of Present Illness     HPI   Patient Care Team:  Krysta Nolasco DO as PCP - General (Family Medicine)  Adis Tejeda MD as PCP -  PCP-Marisela (RTE)  Thanh Junior MD    Review of Systems  Medical History Reviewed by provider this encounter:  Tobacco  Allergies  Meds  Problems  Med Hx  Surg Hx  Fam Hx       Annual Wellness Visit Questionnaire   Kandis is here for her Subsequent Wellness visit.     Health Risk Assessment:   Patient rates overall health as fair. Patient feels that their physical health rating is same. Patient is satisfied with their life. Eyesight was rated as same. Hearing was rated as same. Patient feels that their emotional and mental health rating is same. Patients states they are never, rarely angry. Patient states they are never, rarely unusually tired/fatigued. Pain experienced in the last 7 days has been none. Patient states that she has experienced no weight loss or gain in last 6 months.     Depression Screening:   PHQ-2 Score: 0      Fall Risk Screening:   In the past year, patient has experienced: history of falling in past year    Number of falls: 1  Injured during fall?: Yes    Feels unsteady when standing or walking?: Yes    Worried about falling?: Yes      Urinary Incontinence Screening:   Patient has not leaked urine accidently in the last six months.     Home Safety:  Patient has trouble with stairs inside or outside of their home. Patient has working smoke alarms and has working carbon monoxide detector. Home safety hazards include: none. Using cane at times.    Nutrition:   Current diet is Regular.     Medications:   Patient is not currently taking any over-the-counter supplements. Patient is able to manage medications.     Activities of Daily Living (ADLs)/Instrumental Activities of Daily Living (IADLs):   Walk and transfer into and out of bed and chair?: Yes  Dress and groom yourself?: Yes    Bathe or shower yourself?: Yes    Feed yourself? Yes  Do your laundry/housekeeping?: Yes  Manage your money, pay your bills and track your expenses?: Yes  Make your own meals?: Yes    Do your own shopping?:  Yes    Previous Hospitalizations:   Any hospitalizations or ED visits within the last 12 months?: No      Advance Care Planning:   Living will: Yes    Durable POA for healthcare: Yes    Advanced directive: Yes      Cognitive Screening:   Provider or family/friend/caregiver concerned regarding cognition?: No    Preventive Screenings      Cardiovascular Screening:    General: Screening Not Indicated and History Lipid Disorder      Diabetes Screening:     General: Screening Current      Colorectal Cancer Screening:     General: Screening Not Indicated      Breast Cancer Screening:     General: Screening Current      Cervical Cancer Screening:    General: Screening Not Indicated      Lung Cancer Screening:     General: Screening Not Indicated    Screening, Brief Intervention, and Referral to Treatment (SBIRT)     Screening  Typical number of drinks in a day: 0  Typical number of drinks in a week: 0  Interpretation: Low risk drinking behavior.    Single Item Drug Screening:  How often have you used an illegal drug (including marijuana) or a prescription medication for non-medical reasons in the past year? never    Single Item Drug Screen Score: 0  Interpretation: Negative screen for possible drug use disorder    Other Counseling Topics:   Skin self-exam and calcium and vitamin D intake and regular weightbearing exercise.     Social Drivers of Health     Financial Resource Strain: Low Risk  (10/16/2023)    [facility]    Overall Financial Resource Strain (CARDIA)    • Difficulty of Paying Living Expenses: Not hard at all   Food Insecurity: No Food Insecurity (5/2/2025)    Hunger Vital Sign    • Worried About Running Out of Food in the Last Year: Never true    • Ran Out of Food in the Last Year: Never true   Transportation Needs: No Transportation Needs (5/2/2025)    PRAPARE - Transportation    • Lack of Transportation (Medical): No    • Lack of Transportation  "(Non-Medical): No   Housing Stability: Low Risk  (5/2/2025)    Housing Stability Vital Sign    • Unable to Pay for Housing in the Last Year: No    • Number of Times Moved in the Last Year: 0    • Homeless in the Last Year: No   Utilities: Not At Risk (5/2/2025)    [facility] Utilities    • Threatened with loss of utilities: No     No results found.    Objective   /82 (BP Location: Left arm, Patient Position: Sitting, Cuff Size: Standard)   Pulse 68   Resp 16   Ht 5' 5\" (1.651 m)   Wt 73.5 kg (162 lb)   LMP  (LMP Unknown)   SpO2 96%   BMI 26.96 kg/m²     Physical Exam  Vitals reviewed.   Constitutional:       General: She is not in acute distress.     Appearance: Normal appearance.   HENT:      Head: Normocephalic and atraumatic.      Right Ear: External ear normal.      Left Ear: External ear normal.      Nose: Nose normal.      Mouth/Throat:      Mouth: Mucous membranes are moist.   Eyes:      Extraocular Movements: Extraocular movements intact.      Conjunctiva/sclera: Conjunctivae normal.      Pupils: Pupils are equal, round, and reactive to light.   Cardiovascular:      Rate and Rhythm: Normal rate and regular rhythm.      Heart sounds: Murmur heard.   Pulmonary:      Effort: Pulmonary effort is normal.      Breath sounds: Normal breath sounds.   Abdominal:      General: Bowel sounds are normal. There is no distension.      Palpations: Abdomen is soft.      Tenderness: There is no abdominal tenderness.   Musculoskeletal:      Cervical back: Neck supple.      Right lower leg: No edema.      Left lower leg: No edema.   Lymphadenopathy:      Cervical: No cervical adenopathy.   Skin:     General: Skin is warm.      Capillary Refill: Capillary refill takes less than 2 seconds.      Findings: No rash.   Neurological:      Mental Status: She is alert. Mental status is at baseline.         DO Héctor Suarez [facility]  5/2/2025 11:42 AM      "

## 2025-05-02 NOTE — PATIENT INSTRUCTIONS
"Patient Education     Urinary incontinence in females   The Basics   Written by the doctors and editors at [facility]   What is urinary incontinence? -- Urinary incontinence is the medical term for when a person leaks urine or loses bladder control.  Incontinence is a very common problem, but it is not a normal part of aging. If you have this problem, there are treatments that can help. There are also things that you can do on your own to stop or reduce urine leakage so you don't have to \"just live with it.\"  What are the symptoms of incontinence? -- There are different types of incontinence. Each causes different symptoms. The 3 most common types are:   Stress incontinence - With stress incontinence, you leak urine when you laugh, cough, sneeze, or do anything that \"stresses\" the belly. Stress incontinence is most common in females, especially those who have had a baby.   Urgency incontinence - With urgency incontinence, you feel a strong need to urinate all of a sudden. This is also known as \"urge incontinence.\" Often, the \"urge\" is so strong that you can't make it to the bathroom in time. \"Overactive bladder\" is another term for having a sudden, frequent urge to urinate. People with overactive bladder might or might not actually leak urine.   Mixed incontinence - With mixed incontinence, you have symptoms of both stress and urgency incontinence.  Is there anything I can do on my own to feel better? -- Yes. Here are some things that can help reduce urine leaks:   Reduce the amount of liquid that you drink, especially a few hours before bed.   Cut down on any foods or drinks that make your symptoms worse. Some people find that alcohol, caffeine, or spicy or acidic foods irritate the bladder.   Try to lose weight, if you are overweight. Your doctor or nurse can help you do this in a healthy way.   If you have diabetes, keep your blood sugar as close to your goal level as possible.   If you take medicines called " "diuretics, plan ahead. These medicines increase the need to urinate. Try to take them when you know you will be near a bathroom for a few hours. If you keep having problems with leakage because of diuretics, ask your doctor if you can take a lower dose or switch to a different medicine.  These techniques can also help improve bladder control:   Bladder retraining - During bladder retraining, you go to the bathroom at scheduled times. For instance, you might decide that you will go every hour. Make yourself go every hour, even if you don't feel like you need to. Try to wait the whole hour, even if you need to go sooner. Then, once you get used to going every hour, increase the amount of time you wait in between bathroom visits. Over time, you might be able to \"retrain\" your bladder to wait 3 or 4 hours between bathroom visits.   Pelvic floor muscle training - This involves learning exercises to strengthen and relax your pelvic muscles. These include the muscles that control the flow of urine and bowel movements. When done right, these exercises can help. But people often do them wrong. Ask your doctor or nurse how to do them right. Your doctor might suggest working with a physical therapist who has special training in these exercises.  Should I see my doctor or nurse? -- Yes. Your doctor or nurse can find out what might be causing your incontinence. They can also suggest ways to relieve the problem.  When you speak to your doctor or nurse, ask if any of the medicines you take could be causing your symptoms. Some medicines can cause incontinence or make it worse.  Some people choose to wear pads or special underwear. These can help if you accidentally leak urine once in a while. But they can also cause skin irritation if you use them a lot. If you have incontinence, ask your doctor or nurse how to treat it.  How is incontinence treated? -- The treatment options differ depending on what type of incontinence you have. " Some of the options include:   Medicines to relax the bladder   Surgery to repair the tissues that support the bladder or to improve the flow of urine   Electrical stimulation of the nerves that relax the bladder  Urinary incontinence is more common in people who have been through menopause. (Menopause is when you stop having monthly periods). Some people have vaginal dryness after menopause. If this is the case for you, a treatment called vaginal estrogen might help.  What will my life be like? -- Many people with incontinence can regain bladder control or at least reduce the amount of leakage they have. The most important thing is to tell your doctor or nurse. Then, work with them to find an approach that helps you.  All topics are updated as new evidence becomes available and our peer review process is complete.  This topic retrieved from [employer] on: Feb 26, 2024.  Topic 42967 Version 18.0  Release: 32.2.4 - C32.56  © 2024 UpToDate, Inc. and/or its affiliates. All rights reserved.  figure 1: Location of the bladder     This drawing shows the side view of a woman's body. The bladder is in front of the vagina. The urethra is the tube that carries urine from the bladder out of the body.  Graphic 583459 Version 1.0  Consumer Information Use and Disclaimer   Disclaimer: This generalized information is a limited summary of diagnosis, treatment, and/or medication information. It is not meant to be comprehensive and should be used as a tool to help the user understand and/or assess potential diagnostic and treatment options. It does NOT include all information about conditions, treatments, medications, side effects, or risks that may apply to a specific patient. It is not intended to be medical advice or a substitute for the medical advice, diagnosis, or treatment of a health care provider based on the health care provider's examination and assessment of a patient's specific and unique circumstances. Patients must speak  "with a health care provider for complete information about their health, medical questions, and treatment options, including any risks or benefits regarding use of medications. This information does not endorse any treatments or medications as safe, effective, or approved for treating a specific patient. UpToDate, Inc. and its affiliates disclaim any warranty or liability relating to this information or the use thereof.The use of this information is governed by the Terms of Use, available at https://www.[employer].com/en/know/clinical-effectiveness-terms. 2024© UpToDate, Inc. and its affiliates and/or licensors. All rights reserved.  Copyright   © 2024 UpToDate, Inc. and/or its affiliates. All rights reserved.    Patient Education     Pelvic floor muscle exercises   The Basics   Written by the doctors and editors at [employer]   What is the pelvic floor? -- The \"pelvic floor\" is the name for the muscles that support the organs in the pelvis. These organs include the bladder and rectum. In the female pelvis, they also include the uterus (figure 1).  What are pelvic floor muscle exercises? -- These are exercises that can make your pelvic floor muscles stronger. They involve learning ways to tighten and relax specific muscles.  Pelvic floor muscle exercises can help keep you from leaking urine, gas, or bowel movements. They can also help with a condition called \"pelvic organ prolapse.\" This is when the organs in the lower belly drop down and press against or bulge into the vagina.  One way to strengthen your pelvic floor muscles is to do exercises. These are also known as \"Kegel\" exercises.  How do I do pelvic floor muscle exercises? -- If you want to try pelvic floor muscle exercises, start by talking to your doctor or nurse. They can talk to you about whether these exercises can help you. They can also teach you how to do them correctly.  You will need to learn which muscles to tighten and relax. It is sometimes " "hard to figure out the right muscles.  Some ways you can practice:   People with female or male anatomy - Squeeze the muscles you would use to avoid passing gas.   People with female anatomy - Put a finger inside your vagina and squeeze the muscles around your finger. Or you can imagine that you are sitting on a marble and have to pick it up using your vagina.   People with male anatomy - Squeeze the muscles that control the flow of urine. These exercises might help reduce urine leaks in people who have had surgery to treat prostate cancer or an enlarged prostate.  No matter how you learn to do pelvic floor muscle exercises, know that the muscles involved are not in your belly, thighs, or buttocks.  After you learn which muscles to tighten and relax, you can do the exercises in any position (standing, sitting, or lying down).  Should I see a physical therapist? -- Your doctor or nurse might suggest working with a physical therapist who has special training in pelvic floor issues. They can check your muscle strength and teach you specific exercises.  How often should I do the exercises? -- A common approach is to try to do a set of the exercises 3 times a day.  For each set, do the following about 10 times:   Squeeze your pelvic muscles.   Hold the muscles tight for about 10 seconds.   Relax the muscles completely.  Keep up this routine for at least a few months. You will probably notice results, but it might take a few weeks to several months.  How do pelvic floor muscle exercises help? -- Pelvic floor muscle exercises can help:   Prevent urine leaks in people who have \"stress incontinence\" - This means that they leak urine when they cough, laugh, sneeze, or strain.   Control sudden urges to urinate - These happen to people with \"urinary urgency\" or \"urge incontinence.\"   Control the release of gas or bowel movements   Improve symptoms caused by pelvic organ prolapse - These can include pressure or bulging in the " "vagina. If you have these symptoms, see your doctor or nurse to find out the cause.  It might take a few months of doing the exercises regularly before you notice them working. If you have been doing pelvic floor muscle exercises for several months and they don't seem to be making a difference, tell your doctor or nurse. They might suggest seeing a physical therapist or trying other treatments for your condition.  All topics are updated as new evidence becomes available and our peer review process is complete.  This topic retrieved from [employer] on: Feb 26, 2024.  Topic 18464 Version 15.0  Release: 32.2.4 - C32.56  © 2024 UpToDate, Inc. and/or its affiliates. All rights reserved.  figure 1: Pelvic floor muscles     The \"pelvic floor\" is a group of muscles that support the organs in the pelvis. In females, these organs include the uterus, bladder, and rectum.  Graphic [id number] Version 2.0  Consumer Information Use and Disclaimer   Disclaimer: This generalized information is a limited summary of diagnosis, treatment, and/or medication information. It is not meant to be comprehensive and should be used as a tool to help the user understand and/or assess potential diagnostic and treatment options. It does NOT include all information about conditions, treatments, medications, side effects, or risks that may apply to a specific patient. It is not intended to be medical advice or a substitute for the medical advice, diagnosis, or treatment of a health care provider based on the health care provider's examination and assessment of a patient's specific and unique circumstances. Patients must speak with a health care provider for complete information about their health, medical questions, and treatment options, including any risks or benefits regarding use of medications. This information does not endorse any treatments or medications as safe, effective, or approved for treating a specific patient. UpToDate, Inc. and its " "affiliates disclaim any warranty or liability relating to this information or the use thereof.The use of this information is governed by the Terms of Use, available at https://www.wolterskluwer.com/en/know/clinical-effectiveness-terms. 2024© UpToDate, Inc. and its affiliates and/or licensors. All rights reserved.  Copyright   © 2024 UpToDate, Inc. and/or its affiliates. All rights reserved.    Patient Education     Bladder training   The Basics   Written by the doctors and editors at [email or web address]   What is bladder training? -- Bladder training means changing your habits to better control your bladder (figure 1). It can help with urinary problems like:   Frequency - This means having to use the toilet very frequently.   Urgency - This means suddenly feeling the need to urinate right away.   Leakage - This is when urgency leads to actually leaking urine. It is also called \"urge incontinence.\"  Talk to your doctor or nurse before trying bladder training on your own. They will want to make sure that you do it correctly. They might also want to check for other problems, such as a urinary tract infection.  What is a bladder diary? -- Bladder training involves trying to increase the amount of time between trips to the toilet. A \"bladder diary\" is a way to keep track of how often you go (form 1). Doctors sometimes call this a \"voiding diary.\"  In the diary, write down:   The time you urinate   The amount of urine, if your doctor asked you to measure this   If you had any leaking, how much (small, medium, or large amount), and what you were doing when the leakage happened   The amounts and types of fluids you drink   Any other symptoms you have  How do I train my bladder? -- Once you have an idea of how often you are urinating, try to wait a little longer between trips to the toilet. This should be a gradual process:   First, slightly increase the amount of time between bathroom visits. For example, if you normally go every " "hour, add 15 minutes. This means trying to wait 1 hour and 15 minutes between trips to the bathroom.   Keep following this schedule for several days. If you can do this for 3 or 4 days without problems, increase the time again. For example, you can add 15 more minutes between trips to the bathroom.   Keep doing this until you can wait at least 2 to 3 hours between bathroom visits. It can take time to get to this point. Some people notice improvement within a few weeks of bladder training. But it can take longer.  Keep filling out your bladder diary as you work on bladder training. This will help you see improvement over time. The process might take several weeks.  What else can I do to help with urgency? -- Part of bladder training involves learning to control the urge to urinate and make your bladder wait. Some things you can do to help with this:   Squeeze your pelvic muscles - These include the muscles that control the flow of urine. Try squeezing the muscles and then relaxing them. Repeat this several times.   Change positions - It might help to cross your legs or sit on a firm surface.   Distract your mind - Try to think about something else until the urge passes.   Relax - Stay still when you get the urge to urinate. It might help to do deep breathing exercises. Do not rush to the toilet when it is time to go.  What else should I know? -- These tips might help with bladder training:   Try to only urinate when you actually need to go - For example, avoid going to the bathroom before leaving home \"just in case.\" This can train your brain to think that your bladder is full when it really isn't.   Drink fluids throughout the day - Make sure that you drink enough fluids to stay hydrated. This usually means about 8 cups (64 ounces) a day. Try to spread this throughout the day instead of drinking a lot all at once. If you usually drink a lot more than this, ask your doctor or nurse if it is OK for you to reduce the " "amount.   Avoid drinking fluids soon before bedtime - This can lower the chances that you will need to urinate during the night.   Limit or avoid alcohol and caffeine - Some people find that these things make them need to urinate more.  When should I call the doctor? -- Call your doctor or nurse if:   Your urinary symptoms are not getting better or are getting worse.   You are having trouble with your training schedule - Bladder training can be frustrating and take time. But don't give up. Your doctor or nurse can help you.   You have other problems with urinating, such as pain or burning, not being able to urinate, or seeing blood in your urine.  All topics are updated as new evidence becomes available and our peer review process is complete.  This topic retrieved from [employer] on: Mar 16, 2024.  Topic 311415 Version 3.0  Release: 32.2.4 - C32.74  © 2024 UpToDate, Inc. and/or its affiliates. All rights reserved.  figure 1: Anatomy of the urinary tract     Urine is made by the kidneys. It passes from the kidneys into the bladder through 2 tubes called the ureters. Then, it leaves the bladder through another tube called the urethra.  Graphic 73134 Version 8.0  form 1: Voiding diary     To use this diary, make a note of these things every time you urinate:  The time (for example, \"10:30 AM\")  The amount of urine, if your doctor asked you to measure this  Whether you leaked any urine, and about how much (for example, small/medium/large, or whether or not you needed to change your underwear)  When you went to bed and woke up, what you had to drink, and anything else that might have caused you to urinate (for example, \"just had coffee,\" \"coughed,\" \"was running to the bathroom,\" or \"just took my water pill\")  Start the record in the morning the first time you go to the bathroom after you get up.  Graphic [id number] Version 1.0  Consumer Information Use and Disclaimer   Disclaimer: This generalized information is a limited " summary of diagnosis, treatment, and/or medication information. It is not meant to be comprehensive and should be used as a tool to help the user understand and/or assess potential diagnostic and treatment options. It does NOT include all information about conditions, treatments, medications, side effects, or risks that may apply to a specific patient. It is not intended to be medical advice or a substitute for the medical advice, diagnosis, or treatment of a health care provider based on the health care provider's examination and assessment of a patient's specific and unique circumstances. Patients must speak with a health care provider for complete information about their health, medical questions, and treatment options, including any risks or benefits regarding use of medications. This information does not endorse any treatments or medications as safe, effective, or approved for treating a specific patient. UpToDate, Inc. and its affiliates disclaim any warranty or liability relating to this information or the use thereof.The use of this information is governed by the Terms of Use, available at https://www.[employer].com/en/know/clinical-effectiveness-terms. 2024© UpToDate, Inc. and its affiliates and/or licensors. All rights reserved.  Copyright   © 2024 UpToDate, Inc. and/or its affiliates. All rights reserved.    Medicare Preventive Visit Patient Instructions  Thank you for completing your Welcome to Medicare Visit or Medicare Annual Wellness Visit today. Your next wellness visit will be due in one year (5/3/2026).  The screening/preventive services that you may require over the next 5-10 years are detailed below. Some tests may not apply to you based off risk factors and/or age. Screening tests ordered at today's visit but not completed yet may show as past due. Also, please note that scanned in results may not display below.  Preventive Screenings:  Service Recommendations Previous Testing/Comments    Colorectal Cancer Screening  * Colonoscopy    * Fecal Occult Blood Test (FOBT)/Fecal Immunochemical Test (FIT)  * Fecal DNA/Cologuard Test  * Flexible Sigmoidoscopy Age: 45-75 years old   Colonoscopy: every 10 years (may be performed more frequently if at higher risk)  OR  FOBT/FIT: every 1 year  OR  Cologuard: every 3 years  OR  Sigmoidoscopy: every 5 years  Screening may be recommended earlier than age 45 if at higher risk for colorectal cancer. Also, an individualized decision between you and your healthcare provider will decide whether screening between the ages of 76-85 would be appropriate. Colonoscopy: Not on file  FOBT/FIT: Not on file  Cologuard: Not on file  Sigmoidoscopy: Not on file    Screening Not Indicated     Breast Cancer Screening Age: 40+ years old  Frequency: every 1-2 years  Not required if history of left and right mastectomy Mammogram: 04/10/2024    Screening Current   Cervical Cancer Screening Between the ages of 21-29, pap smear recommended once every 3 years.   Between the ages of 30-65, can perform pap smear with HPV co-testing every 5 years.   Recommendations may differ for women with a history of total hysterectomy, cervical cancer, or abnormal pap smears in past. Pap Smear: Not on file    Screening Not Indicated   Hepatitis C Screening Once for adults born between 1945 and 1965  More frequently in patients at high risk for Hepatitis C Hep C Antibody: Not on file        Diabetes Screening 1-2 times per year if you're at risk for diabetes or have pre-diabetes Fasting glucose: 94 mg/dL (8/25/2020)  A1C: No results in last 5 years (No results in last 5 years)  Screening Current   Cholesterol Screening Once every 5 years if you don't have a lipid disorder. May order more often based on risk factors. Lipid panel: 05/06/2024    Screening Not Indicated  History Lipid Disorder     Other Preventive Screenings Covered by Medicare:  Abdominal Aortic Aneurysm (AAA) Screening: covered once if your  at risk. You're considered to be at risk if you have a family history of AAA.  Lung Cancer Screening: covers low dose CT scan once per year if you meet all of the following conditions: (1) Age 55-77; (2) No signs or symptoms of lung cancer; (3) Current smoker or have quit smoking within the last 15 years; (4) You have a tobacco smoking history of at least 20 pack years (packs per day multiplied by number of years you smoked); (5) You get a written order from a healthcare provider.  Glaucoma Screening: covered annually if you're considered high risk: (1) You have diabetes OR (2) Family history of glaucoma OR (3)  aged 50 and older OR (4)  American aged 65 and older  Osteoporosis Screening: covered every 2 years if you meet one of the following conditions: (1) You're estrogen deficient and at risk for osteoporosis based off medical history and other findings; (2) Have a vertebral abnormality; (3) On glucocorticoid therapy for more than 3 months; (4) Have primary hyperparathyroidism; (5) On osteoporosis medications and need to assess response to drug therapy.   Last bone density test (DXA Scan): 07/12/2017.  HIV Screening: covered annually if you're between the age of 15-65. Also covered annually if you are younger than 15 and older than 65 with risk factors for HIV infection. For pregnant patients, it is covered up to 3 times per pregnancy.    Immunizations:  Immunization Recommendations   Influenza Vaccine Annual influenza vaccination during flu season is recommended for all persons aged >= 6 months who do not have contraindications   Pneumococcal Vaccine   * Pneumococcal conjugate vaccine = PCV13 (Prevnar 13), PCV15 (Vaxneuvance), PCV20 (Prevnar 20)  * Pneumococcal polysaccharide vaccine = PPSV23 (Pneumovax) Adults 19-63 yo with certain risk factors or if 65+ yo  If never received any pneumonia vaccine: recommend Prevnar 20 (PCV20)  Give PCV20 if previously received 1 dose of PCV13 or PPSV23    Hepatitis B Vaccine 3 dose series if at intermediate or high risk (ex: diabetes, end stage renal disease, liver disease)   Respiratory syncytial virus (RSV) Vaccine - COVERED BY MEDICARE PART D  * RSVPreF3 (Arexvy) CDC recommends that adults 60 years of age and older may receive a single dose of RSV vaccine using shared clinical decision-making (SCDM)   Tetanus (Td) Vaccine - COST NOT COVERED BY MEDICARE PART B Following completion of primary series, a booster dose should be given every 10 years to maintain immunity against tetanus. Td may also be given as tetanus wound prophylaxis.   Tdap Vaccine - COST NOT COVERED BY MEDICARE PART B Recommended at least once for all adults. For pregnant patients, recommended with each pregnancy.   Shingles Vaccine (Shingrix) - COST NOT COVERED BY MEDICARE PART B  2 shot series recommended in those 19 years and older who have or will have weakened immune systems or those 50 years and older     Health Maintenance Due:      Topic Date Due   • Breast Cancer Screening: Mammogram  04/10/2025     Immunizations Due:      Topic Date Due   • COVID-19 Vaccine (5 - 2024-25 season) 09/01/2024     Advance Directives   What are advance directives?  Advance directives are legal documents that state your wishes and plans for medical care. These plans are made ahead of time in case you lose your ability to make decisions for yourself. Advance directives can apply to any medical decision, such as the treatments you want, and if you want to donate organs.   What are the types of advance directives?  There are many types of advance directives, and each state has rules about how to use them. You may choose a combination of any of the following:  Living will:  This is a written record of the treatment you want. You can also choose which treatments you do not want, which to limit, and which to stop at a certain time. This includes surgery, medicine, IV fluid, and tube feedings.   Durable power of   for healthcare (DPA):  This is a written record that states who you want to make healthcare choices for you when you are unable to make them for yourself. This person, called a proxy, is usually a family member or a friend. You may choose more than 1 proxy.  Do not resuscitate (DNR) order:  A DNR order is used in case your heart stops beating or you stop breathing. It is a request not to have certain forms of treatment, such as CPR. A DNR order may be included in other types of advance directives.  Medical directive:  This covers the care that you want if you are in a coma, near death, or unable to make decisions for yourself. You can list the treatments you want for each condition. Treatment may include pain medicine, surgery, blood transfusions, dialysis, IV or tube feedings, and a ventilator (breathing machine).  Values history:  This document has questions about your views, beliefs, and how you feel and think about life. This information can help others choose the care that you would choose.  Why are advance directives important?  An advance directive helps you control your care. Although spoken wishes may be used, it is better to have your wishes written down. Spoken wishes can be misunderstood, or not followed. Treatments may be given even if you do not want them. An advance directive may make it easier for your family to make difficult choices about your care.   Fall Prevention    Fall prevention  includes ways to make your home and other areas safer. It also includes ways you can move more carefully to prevent a fall. Health conditions that cause changes in your blood pressure, vision, or muscle strength and coordination may increase your risk for falls. Medicines may also increase your risk for falls if they make you dizzy, weak, or sleepy.   Fall prevention tips:   Stand or sit up slowly.    Use assistive devices as directed.    Wear shoes that fit well and have soles that .    Wear a  personal alarm.    Stay active.    Manage your medical conditions.    Home Safety Tips:  Add items to prevent falls in the bathroom.    Keep paths clear.    Install bright lights in your home.    Keep items you use often on shelves within reach.    Paint or place reflective tape on the edges of your stairs.    Urinary Incontinence   Urinary incontinence (UI)  is when you lose control of your bladder. UI develops because your bladder cannot store or empty urine properly. The 3 most common types of UI are stress incontinence, urge incontinence, or both.  Medicines:   May be given to help strengthen your bladder control. Report any side effects of medication to your healthcare provider.  Do pelvic muscle exercises often:  Your pelvic muscles help you stop urinating. Squeeze these muscles tight for 5 seconds, then relax for 5 seconds. Gradually work up to squeezing for 10 seconds. Do 3 sets of 15 repetitions a day, or as directed. This will help strengthen your pelvic muscles and improve bladder control.  Train your bladder:  Go to the bathroom at set times, such as every 2 hours, even if you do not feel the urge to go. You can also try to hold your urine when you feel the urge to go. For example, hold your urine for 5 minutes when you feel the urge to go. As that becomes easier, hold your urine for 10 minutes.   Self-care:   Keep a UI record.  Write down how often you leak urine and how much you leak. Make a note of what you were doing when you leaked urine.  Drink liquids as directed. You may need to limit the amount of liquid you drink to help control your urine leakage. Do not drink any liquid right before you go to bed. Limit or do not have drinks that contain caffeine or alcohol.   Prevent constipation.  Eat a variety of high-fiber foods. Good examples are high-fiber cereals, beans, vegetables, and whole-grain breads. Walking is the best way to trigger your intestines to have a bowel movement.  Exercise regularly  and maintain a healthy weight.  Weight loss and exercise will decrease pressure on your bladder and help you control your leakage.   Use a catheter as directed  to help empty your bladder. A catheter is a tiny, plastic tube that is put into your bladder to drain your urine.   Go to behavior therapy as directed.  Behavior therapy may be used to help you learn to control your urge to urinate.    Weight Management   Why it is important to manage your weight:  Being overweight increases your risk of health conditions such as heart disease, high blood pressure, type 2 diabetes, and certain types of cancer. It can also increase your risk for osteoarthritis, sleep apnea, and other respiratory problems. Aim for a slow, steady weight loss. Even a small amount of weight loss can lower your risk of health problems.  How to lose weight safely:  A safe and healthy way to lose weight is to eat fewer calories and get regular exercise. You can lose up about 1 pound a week by decreasing the number of calories you eat by 500 calories each day.   Healthy meal plan for weight management:  A healthy meal plan includes a variety of foods, contains fewer calories, and helps you stay healthy. A healthy meal plan includes the following:  Eat whole-grain foods more often.  A healthy meal plan should contain fiber. Fiber is the part of grains, fruits, and vegetables that is not broken down by your body. Whole-grain foods are healthy and provide extra fiber in your diet. Some examples of whole-grain foods are whole-wheat breads and pastas, oatmeal, brown rice, and bulgur.  Eat a variety of vegetables every day.  Include dark, leafy greens such as spinach, kale, samuel greens, and mustard greens. Eat yellow and orange vegetables such as carrots, sweet potatoes, and winter squash.   Eat a variety of fruits every day.  Choose fresh or canned fruit (canned in its own juice or light syrup) instead of juice. Fruit juice has very little or no  fiber.  Eat low-fat dairy foods.  Drink fat-free (skim) milk or 1% milk. Eat fat-free yogurt and low-fat cottage cheese. Try low-fat cheeses such as mozzarella and other reduced-fat cheeses.  Choose meat and other protein foods that are low in fat.  Choose beans or other legumes such as split peas or lentils. Choose fish, skinless poultry (chicken or turkey), or lean cuts of red meat (beef or pork). Before you cook meat or poultry, cut off any visible fat.   Use less fat and oil.  Try baking foods instead of frying them. Add less fat, such as margarine, sour cream, regular salad dressing and mayonnaise to foods. Eat fewer high-fat foods. Some examples of high-fat foods include french fries, doughnuts, ice cream, and cakes.  Eat fewer sweets.  Limit foods and drinks that are high in sugar. This includes candy, cookies, regular soda, and sweetened drinks.  Exercise:  Exercise at least 30 minutes per day on most days of the week. Some examples of exercise include walking, biking, dancing, and swimming. You can also fit in more physical activity by taking the stairs instead of the elevator or parking farther away from stores. Ask your healthcare provider about the best exercise plan for you.      © Copyright [employer] 2018 Information is for End User's use only and may not be sold, redistributed or otherwise used for commercial purposes. All illustrations and images included in CareNotes® are the copyrighted property of [employer] or [employer]

## 2025-05-02 NOTE — ASSESSMENT & PLAN NOTE
Wt Readings from Last 3 Encounters:   05/02/25 73.5 kg (162 lb)   03/20/25 72.6 kg (160 lb)   10/29/24 73.2 kg (161 lb 6.4 oz)               Orders:  •  CBC and differential; Future  •  Comprehensive metabolic panel; Future  •  Lipid panel; Future  •  TSH, 3rd generation with Free T4 reflex; Future

## 2025-05-06 ENCOUNTER — TELEPHONE (OUTPATIENT)
Age: 85
End: 2025-05-06

## 2025-05-06 NOTE — TELEPHONE ENCOUNTER
[facility] called stating patient is there currently for blood work but they do not have any orders.    Lab orders faxed by [facility] to [phone]

## 2025-05-07 ENCOUNTER — HOSPITAL ENCOUNTER (OUTPATIENT)
Age: 85
Discharge: HOME | End: 2025-05-07
Attending: FAMILY MEDICINE
Payer: MEDICARE

## 2025-05-07 VITALS — HEIGHT: 63 IN | BODY MASS INDEX: 29.16 KG/M2

## 2025-05-07 DIAGNOSIS — Z78.0 ENCOUNTER FOR OSTEOPOROSIS SCREENING IN ASYMPTOMATIC POSTMENOPAUSAL PATIENT: ICD-10-CM

## 2025-05-07 DIAGNOSIS — Z13.820 ENCOUNTER FOR OSTEOPOROSIS SCREENING IN ASYMPTOMATIC POSTMENOPAUSAL PATIENT: ICD-10-CM

## 2025-05-07 LAB
ALBUMIN SERPL-MCNC: 4.4 G/DL (ref 3.6–5.1)
ALBUMIN/GLOB SERPL: 1.6 (CALC) (ref 1–2.5)
ALP SERPL-CCNC: 85 U/L (ref 37–153)
ALT SERPL-CCNC: 12 U/L (ref 6–29)
AST SERPL-CCNC: 21 U/L (ref 10–35)
BASOPHILS # BLD AUTO: 29 CELLS/UL (ref 0–200)
BASOPHILS NFR BLD AUTO: 0.6 %
BILIRUB SERPL-MCNC: 0.7 MG/DL (ref 0.2–1.2)
BUN SERPL-MCNC: 14 MG/DL (ref 7–25)
BUN/CREAT SERPL: ABNORMAL (CALC) (ref 6–22)
CALCIUM SERPL-MCNC: 9.6 MG/DL (ref 8.6–10.4)
CHLORIDE SERPL-SCNC: 105 MMOL/L (ref 98–110)
CHOLEST SERPL-MCNC: 170 MG/DL
CHOLEST/HDLC SERPL: 2.6 (CALC)
CO2 SERPL-SCNC: 28 MMOL/L (ref 20–32)
CREAT SERPL-MCNC: 0.81 MG/DL (ref 0.6–0.95)
EOSINOPHIL # BLD AUTO: 58 CELLS/UL (ref 15–500)
EOSINOPHIL NFR BLD AUTO: 1.2 %
ERYTHROCYTE [DISTWIDTH] IN BLOOD BY AUTOMATED COUNT: 13.2 % (ref 11–15)
GFR/BSA.PRED SERPLBLD CYS-BASED-ARV: 71 ML/MIN/1.73M2
GLOBULIN SER CALC-MCNC: 2.8 G/DL (CALC) (ref 1.9–3.7)
GLUCOSE SERPL-MCNC: 102 MG/DL (ref 65–99)
HCT VFR BLD AUTO: 42.3 % (ref 35–45)
HDLC SERPL-MCNC: 65 MG/DL
HGB BLD-MCNC: 14.8 G/DL (ref 11.7–15.5)
LDLC SERPL CALC-MCNC: 81 MG/DL (CALC)
LYMPHOCYTES # BLD AUTO: 1373 CELLS/UL (ref 850–3900)
LYMPHOCYTES NFR BLD AUTO: 28.6 %
MCH RBC QN AUTO: 33.6 PG (ref 27–33)
MCHC RBC AUTO-ENTMCNC: 35 G/DL (ref 32–36)
MCV RBC AUTO: 95.9 FL (ref 80–100)
MONOCYTES # BLD AUTO: 499 CELLS/UL (ref 200–950)
MONOCYTES NFR BLD AUTO: 10.4 %
NEUTROPHILS # BLD AUTO: 2842 CELLS/UL (ref 1500–7800)
NEUTROPHILS NFR BLD AUTO: 59.2 %
NONHDLC SERPL-MCNC: 105 MG/DL (CALC)
PLATELET # BLD AUTO: 229 THOUSAND/UL (ref 140–400)
PMV BLD REES-ECKER: 10.5 FL (ref 7.5–12.5)
POTASSIUM SERPL-SCNC: 4.1 MMOL/L (ref 3.5–5.3)
PROT SERPL-MCNC: 7.2 G/DL (ref 6.1–8.1)
RBC # BLD AUTO: 4.41 MILLION/UL (ref 3.8–5.1)
SODIUM SERPL-SCNC: 142 MMOL/L (ref 135–146)
TRIGL SERPL-MCNC: 139 MG/DL
TSH SERPL-ACNC: 1.71 MIU/L (ref 0.4–4.5)
WBC # BLD AUTO: 4.8 THOUSAND/UL (ref 3.8–10.8)

## 2025-05-07 PROCEDURE — 77080 DXA BONE DENSITY AXIAL: CPT

## 2025-05-08 DIAGNOSIS — I10 HYPERTENSION, UNSPECIFIED TYPE: ICD-10-CM

## 2025-05-08 RX ORDER — LISINOPRIL 20 MG/1
20 TABLET ORAL DAILY
Qty: 90 TABLET | Refills: 0 | OUTPATIENT
Start: 2025-05-08

## 2025-06-05 ENCOUNTER — RESULTS FOLLOW-UP (OUTPATIENT)
Dept: FAMILY MEDICINE CLINIC | Facility: CLINIC | Age: 85
End: 2025-06-05

## 2025-08-01 DIAGNOSIS — I10 PRIMARY HYPERTENSION: ICD-10-CM

## 2025-08-01 RX ORDER — LISINOPRIL 40 MG/1
40 TABLET ORAL DAILY
Qty: 90 TABLET | Refills: 1 | Status: SHIPPED | OUTPATIENT
Start: 2025-08-01

## 2025-08-02 DIAGNOSIS — E78.5 HYPERLIPIDEMIA, UNSPECIFIED HYPERLIPIDEMIA TYPE: ICD-10-CM

## 2025-08-04 RX ORDER — ATORVASTATIN CALCIUM 20 MG/1
20 TABLET, FILM COATED ORAL DAILY
Qty: 90 TABLET | Refills: 1 | Status: SHIPPED | OUTPATIENT
Start: 2025-08-04

## 2025-08-07 DIAGNOSIS — I10 HYPERTENSION, UNSPECIFIED TYPE: ICD-10-CM

## 2025-08-08 RX ORDER — METOPROLOL SUCCINATE 25 MG/1
25 TABLET, EXTENDED RELEASE ORAL DAILY
Qty: 90 TABLET | Refills: 1 | Status: SHIPPED | OUTPATIENT
Start: 2025-08-08